# Patient Record
Sex: FEMALE | Race: WHITE | NOT HISPANIC OR LATINO | Employment: OTHER | URBAN - METROPOLITAN AREA
[De-identification: names, ages, dates, MRNs, and addresses within clinical notes are randomized per-mention and may not be internally consistent; named-entity substitution may affect disease eponyms.]

---

## 2017-01-27 ENCOUNTER — TELEPHONE (OUTPATIENT)
Dept: FAMILY MEDICINE | Facility: OTHER | Age: 62
End: 2017-01-27

## 2017-01-27 DIAGNOSIS — H02.9 EYELID ABNORMALITY: Primary | ICD-10-CM

## 2017-01-27 NOTE — TELEPHONE ENCOUNTER
Patient states she needs an Ophthalmology referral placed for a procedure done by Dr. White on 12/8/16 and subsequent follow up on 1/10/17. Patient was last seen by PCP 7/18/16 at which time the office note did mention possibly needing to go to [facility].     This message is forwarded to Ashlee, nurse with Dr. Cannon, for review.

## 2017-01-30 ENCOUNTER — TELEPHONE (OUTPATIENT)
Dept: FAMILY MEDICINE | Facility: OTHER | Age: 62
End: 2017-01-30

## 2017-01-31 DIAGNOSIS — I10 HTN (HYPERTENSION): Primary | ICD-10-CM

## 2017-01-31 DIAGNOSIS — J32.9 SINUSITIS, CHRONIC: ICD-10-CM

## 2017-02-01 RX ORDER — MOMETASONE FUROATE MONOHYDRATE 50 UG/1
SPRAY, METERED NASAL
Qty: 17 G | Refills: 1 | Status: SHIPPED | OUTPATIENT
Start: 2017-02-01 | End: 2017-11-13

## 2017-02-01 RX ORDER — METOPROLOL SUCCINATE 25 MG/1
TABLET, EXTENDED RELEASE ORAL
Qty: 180 TABLET | Refills: 0 | Status: SHIPPED | OUTPATIENT
Start: 2017-02-01 | End: 2017-04-26

## 2017-02-25 LAB
HPV ABSTRACT: NORMAL
PAP-ABSTRACT: NORMAL

## 2017-03-10 DIAGNOSIS — I10 HTN (HYPERTENSION): ICD-10-CM

## 2017-03-10 DIAGNOSIS — I10 ESSENTIAL HYPERTENSION: Primary | ICD-10-CM

## 2017-03-10 NOTE — TELEPHONE ENCOUNTER
Pt of .HCTZ last filled on 9.30.16,# 90. Last office visit on 7.8.16.Last K+ level on 5.12.15.Medication pended for your approval at this time.Thank you.

## 2017-03-11 RX ORDER — HYDROCHLOROTHIAZIDE 12.5 MG/1
TABLET ORAL
Qty: 90 TABLET | Refills: 0 | Status: SHIPPED | OUTPATIENT
Start: 2017-03-11 | End: 2017-06-05

## 2017-04-26 DIAGNOSIS — I10 HTN (HYPERTENSION): ICD-10-CM

## 2017-04-27 RX ORDER — METOPROLOL SUCCINATE 25 MG/1
TABLET, EXTENDED RELEASE ORAL
Qty: 180 TABLET | Refills: 0 | Status: SHIPPED | OUTPATIENT
Start: 2017-04-27 | End: 2017-07-31

## 2017-05-25 ENCOUNTER — TRANSFERRED RECORDS (OUTPATIENT)
Dept: HEALTH INFORMATION MANAGEMENT | Facility: HOSPITAL | Age: 62
End: 2017-05-25

## 2017-06-05 DIAGNOSIS — I10 ESSENTIAL HYPERTENSION: ICD-10-CM

## 2017-06-07 RX ORDER — HYDROCHLOROTHIAZIDE 12.5 MG/1
TABLET ORAL
Qty: 90 TABLET | Refills: 0 | Status: SHIPPED | OUTPATIENT
Start: 2017-06-07 | End: 2017-08-31

## 2017-06-14 ENCOUNTER — TELEPHONE (OUTPATIENT)
Dept: FAMILY MEDICINE | Facility: OTHER | Age: 62
End: 2017-06-14

## 2017-06-14 NOTE — TELEPHONE ENCOUNTER
Please schedule patient for date/time: can work into our schedule tomorrow at 4:15, arrival time 4:00    Have patient go to ER/Urgent Care Center. Urgent Care hours are 9:30 am to 8 pm, open 7 days a week. No.    Provider will call patient.No.    Other:

## 2017-06-14 NOTE — TELEPHONE ENCOUNTER
10:46 AM    Reason for Call: OVERBOOK    Patient is having the following symptoms: High Anxiety for  2 weeks    The patient is requesting an appointment for Thursday with Dr. Kassandra Chaparro    Was an appointment offered for this call? No    Preferred method for responding to this message: [phone]    If we cannot reach you directly, may we leave a detailed response at the number you provided?  Yes    Can this message wait until your PCP/provider returns, if unavailable today?  Yes, Patient was informed that Dr. Chaparro is out today.    Gisella Khalil

## 2017-06-15 ENCOUNTER — OFFICE VISIT (OUTPATIENT)
Dept: FAMILY MEDICINE | Facility: OTHER | Age: 62
End: 2017-06-15
Attending: FAMILY MEDICINE
Payer: COMMERCIAL

## 2017-06-15 VITALS
HEART RATE: 77 BPM | BODY MASS INDEX: 20.92 KG/M2 | TEMPERATURE: 97.7 F | OXYGEN SATURATION: 98 % | WEIGHT: 138 LBS | DIASTOLIC BLOOD PRESSURE: 80 MMHG | RESPIRATION RATE: 20 BRPM | SYSTOLIC BLOOD PRESSURE: 138 MMHG | HEIGHT: 68 IN

## 2017-06-15 DIAGNOSIS — N30.01 ACUTE CYSTITIS WITH HEMATURIA: ICD-10-CM

## 2017-06-15 DIAGNOSIS — R30.0 DYSURIA: Primary | ICD-10-CM

## 2017-06-15 DIAGNOSIS — R11.0 NAUSEA: ICD-10-CM

## 2017-06-15 DIAGNOSIS — R52 PAIN: ICD-10-CM

## 2017-06-15 DIAGNOSIS — F41.9 ANXIETY: ICD-10-CM

## 2017-06-15 LAB
ALBUMIN UR-MCNC: NEGATIVE MG/DL
APPEARANCE UR: CLEAR
BACTERIA #/AREA URNS HPF: ABNORMAL /HPF
BILIRUB UR QL STRIP: NEGATIVE
COLOR UR AUTO: ABNORMAL
GLUCOSE UR STRIP-MCNC: NEGATIVE MG/DL
HGB UR QL STRIP: ABNORMAL
KETONES UR STRIP-MCNC: 10 MG/DL
LEUKOCYTE ESTERASE UR QL STRIP: NEGATIVE
NITRATE UR QL: NEGATIVE
PH UR STRIP: 6.5 PH (ref 4.7–8)
RBC #/AREA URNS AUTO: 1 /HPF (ref 0–2)
SP GR UR STRIP: 1 (ref 1–1.03)
SQUAMOUS #/AREA URNS AUTO: 1 /HPF (ref 0–1)
URN SPEC COLLECT METH UR: ABNORMAL
UROBILINOGEN UR STRIP-MCNC: NORMAL MG/DL (ref 0–2)
WBC #/AREA URNS AUTO: 1 /HPF (ref 0–2)

## 2017-06-15 PROCEDURE — 99213 OFFICE O/P EST LOW 20 MIN: CPT | Performed by: FAMILY MEDICINE

## 2017-06-15 PROCEDURE — 81001 URINALYSIS AUTO W/SCOPE: CPT | Performed by: FAMILY MEDICINE

## 2017-06-15 PROCEDURE — 87086 URINE CULTURE/COLONY COUNT: CPT | Performed by: FAMILY MEDICINE

## 2017-06-15 RX ORDER — SULFAMETHOXAZOLE/TRIMETHOPRIM 800-160 MG
1 TABLET ORAL 2 TIMES DAILY
Qty: 10 TABLET | Refills: 0 | Status: SHIPPED | OUTPATIENT
Start: 2017-06-15 | End: 2017-06-20

## 2017-06-15 RX ORDER — TRETINOIN 0.25 MG/G
CREAM TOPICAL
COMMUNITY
Start: 2017-03-24

## 2017-06-15 RX ORDER — CLONAZEPAM 0.5 MG/1
0.25-0.5 TABLET ORAL 2 TIMES DAILY PRN
Qty: 60 TABLET | Refills: 0 | Status: SHIPPED | OUTPATIENT
Start: 2017-06-15 | End: 2017-12-13

## 2017-06-15 RX ORDER — AZELAIC ACID 0.15 G/G
GEL TOPICAL
COMMUNITY
Start: 2015-12-29 | End: 2018-05-08

## 2017-06-15 RX ORDER — ONDANSETRON 4 MG/1
4 TABLET, FILM COATED ORAL EVERY 8 HOURS PRN
Qty: 30 TABLET | Refills: 1 | Status: SHIPPED | OUTPATIENT
Start: 2017-06-15 | End: 2018-08-01

## 2017-06-15 RX ORDER — DESONIDE 0.5 MG/G
OINTMENT TOPICAL
COMMUNITY
Start: 2016-12-30 | End: 2019-12-02

## 2017-06-15 RX ORDER — IBUPROFEN 600 MG/1
TABLET, FILM COATED ORAL
Qty: 60 TABLET | Refills: 1 | Status: SHIPPED | OUTPATIENT
Start: 2017-06-15 | End: 2018-07-02

## 2017-06-15 ASSESSMENT — PAIN SCALES - GENERAL: PAINLEVEL: NO PAIN (0)

## 2017-06-15 NOTE — MR AVS SNAPSHOT
"              After Visit Summary   6/15/2017    Estela Delacruz    MRN: [id number]           Patient Information     Date Of Birth          1955        Visit Information        Provider Department      6/15/2017 4:15 PM Kassandra Chaparro MD [facility]        Today's Diagnoses     Dysuria    -  1    Acute cystitis with hematuria        Anxiety        Nausea        Pain           Follow-ups after your visit        Follow-up notes from your care team     Return in about 3 weeks (around 7/6/2017).      Who to contact     If you have questions or need follow up information about today's clinic visit or your schedule please contact [facility] directly at [phone].  Normal or non-critical lab and imaging results will be communicated to you by MyChart, letter or phone within 4 business days after the clinic has received the results. If you do not hear from us within 7 days, please contact the clinic through [employer] or phone. If you have a critical or abnormal lab result, we will notify you by phone as soon as possible.  Submit refill requests through [employer] or call your pharmacy and they will forward the refill request to us. Please allow 3 business days for your refill to be completed.          Additional Information About Your Visit        MyChart Information     [employer] gives you secure access to your electronic health record. If you see a primary care provider, you can also send messages to your care team and make appointments. If you have questions, please call your primary care clinic.  If you do not have a primary care provider, please call [phone] and they will assist you.        Care EveryWhere ID     This is your Care EveryWhere ID. This could be used by other organizations to access your [place] medical records  [id number]        Your Vitals Were     Pulse Temperature Respirations Height Pulse Oximetry Breastfeeding?    77 97.7  F (36.5  C) (Tympanic) 20 5' 8\" (1.727 m) " 98% No    BMI (Body Mass Index)                   20.98 kg/m2            Blood Pressure from Last 3 Encounters:   06/15/17 138/80   07/18/16 127/77   03/03/16 128/78    Weight from Last 3 Encounters:   06/15/17 138 lb (62.6 kg)   07/18/16 145 lb (65.8 kg)   03/03/16 145 lb (65.8 kg)              We Performed the Following     UA reflex to Microscopic and Culture - [place]     Urine Culture Aerobic Bacterial          Today's Medication Changes          These changes are accurate as of: 6/15/17 10:45 PM.  If you have any questions, ask your nurse or doctor.               Start taking these medicines.        Dose/Directions    clonazePAM 0.5 MG tablet   Commonly known as:  klonoPIN   Used for:  Anxiety   Started by:  Kassandra Chaparro MD        Dose:  0.25-0.5 mg   Take 0.5-1 tablets (0.25-0.5 mg) by mouth 2 times daily as needed for anxiety   Quantity:  60 tablet   Refills:  0       ondansetron 4 MG tablet   Commonly known as:  ZOFRAN   Used for:  Nausea   Started by:  Kassandra Chaparro MD        Dose:  4 mg   Take 1 tablet (4 mg) by mouth every 8 hours as needed for nausea   Quantity:  30 tablet   Refills:  1       sulfamethoxazole-trimethoprim 800-160 MG per tablet   Commonly known as:  BACTRIM DS/SEPTRA DS   Used for:  Acute cystitis with hematuria   Started by:  Kassandra Chaparro MD        Dose:  1 tablet   Take 1 tablet by mouth 2 times daily for 5 days   Quantity:  10 tablet   Refills:  0         These medicines have changed or have updated prescriptions.        Dose/Directions    ibuprofen 600 MG tablet   Commonly known as:  ADVIL/MOTRIN   This may have changed:  See the new instructions.   Used for:  Pain   Changed by:  Kassandra Chaparro MD        TAKE 1 TABLET BY MOUTH EVERY 8 HOURS AS NEEDED FOR PAIN   Quantity:  60 tablet   Refills:  1            Where to get your medicines      These medications were sent to [id number] - [place], [place] - [address] AT [facility] [address], [place]  [place] [address]     Phone:  [phone]     ibuprofen 600 MG tablet    ondansetron 4 MG tablet    sulfamethoxazole-trimethoprim 800-160 MG per tablet         Some of these will need a paper prescription and others can be bought over the counter.  Ask your nurse if you have questions.     Bring a paper prescription for each of these medications     clonazePAM 0.5 MG tablet                Primary Care Provider Office Phone # Fax #    Kassandra Chaparro [phone]       [facility] [place] [address]  [facility] [place] [address]        Thank you!     Thank you for choosing [facility] [place]  for your care. Our goal is always to provide you with excellent care. Hearing back from our patients is one way we can continue to improve our services. Please take a few minutes to complete the written survey that you may receive in the mail after your visit with us. Thank you!             Your Updated Medication List - Protect others around you: Learn how to safely use, store and throw away your medicines at www.disposemymeds.org.          This list is accurate as of: 6/15/17 10:45 PM.  Always use your most recent med list.                   Brand Name Dispense Instructions for use    ASPIR-LOW 81 MG EC tablet   Generic drug:  aspirin     90 tablet    TAKE ONE TABLET BY MOUTH DAILY       Azelaic Acid 15 % gel          clonazePAM 0.5 MG tablet    klonoPIN    60 tablet    Take 0.5-1 tablets (0.25-0.5 mg) by mouth 2 times daily as needed for anxiety       desonide 0.05 % ointment    DESOWEN         hydrochlorothiazide 12.5 MG Tabs tablet     90 tablet    TAKE 1 TABLET BY MOUTH DAILY       ibuprofen 600 MG tablet    ADVIL/MOTRIN    60 tablet    TAKE 1 TABLET BY MOUTH EVERY 8 HOURS AS NEEDED FOR PAIN       metoprolol 25 MG 24 hr tablet    TOPROL-XL    180 tablet    TAKE 2 TABLETS BY MOUTH DAILY       metroNIDAZOLE 0.75 % topical gel    METROGEL    45 g    Apply topically 2 times daily Apply h,s. And AM to central  face - [id number]       mometasone 50 MCG/ACT spray    NASONEX    17 g    SPRAY 2 SPRAYS INTO EACH NOSTRIL DAILY AS NEEDED       nexIUM 40 MG CR capsule   Generic drug:  esomeprazole     90 capsule    TAKE 1 CAPSULE BY MOUTH DAILY       ondansetron 4 MG tablet    ZOFRAN    30 tablet    Take 1 tablet (4 mg) by mouth every 8 hours as needed for nausea       PREMARIN 0.625 MG tablet   Generic drug:  estrogens (conjugated)      Take  by mouth daily.       PROVERA 10 MG tablet   Generic drug:  medroxyPROGESTERone      Take 10 mg by mouth. takes every other month days 1-12       sulfamethoxazole-trimethoprim 800-160 MG per tablet    BACTRIM DS/SEPTRA DS    10 tablet    Take 1 tablet by mouth 2 times daily for 5 days       tretinoin 0.025 % cream    RETIN-A     APPLY TOPICALLY AT BEDTIME

## 2017-06-15 NOTE — NURSING NOTE
"Chief Complaint   Patient presents with     Anxiety     UTI       Initial /80  Pulse 77  Temp 97.7  F (36.5  C) (Tympanic)  Resp 20  Ht 5' 8\" (1.727 m)  Wt 138 lb (62.6 kg)  SpO2 98%  Breastfeeding? No  BMI 20.98 kg/m2 Estimated body mass index is 20.98 kg/(m^2) as calculated from the following:    Height as of this encounter: 5' 8\" (1.727 m).    Weight as of this encounter: 138 lb (62.6 kg).  Medication Reconciliation: complete   Ashlee Carvajal      "

## 2017-06-16 NOTE — PROGRESS NOTES
[facility]    Estela Delacruz, 62 year old, female presents with   Chief Complaint   Patient presents with     Anxiety     her  left her unexpectedly. She doesn't feel depressed, just very anxious. We had used Klonopin in the past which worked well, Sertraline or something similar did not agree with her and she prefers not to try this again.      UTI     dysuria and hematuria the last 2 days       PAST MEDICAL HISTORY:  Past Medical History:   Diagnosis Date     Asymptomatic varicose veins 02/04/2013     Basal cell carcinoma 04/26/2007    [facility] chest wall. Dr Frey     Esophageal reflux 05/26/2011     Hypertension 03/28/2006     Unspecified sinusitis (chronic) 02/25/2000       PAST SURGICAL HISTORY:  Past Surgical History:   Procedure Laterality Date     Arthroscopy right meniscal tear  2006    Dr Orozco     Arthroscopy right shoulder  2010     COLONOSCOPY  2008    Family history of colon cancer (mother). Negative. Repeat 2013     DILATION AND CURETTAGE       Removed mass left neck  2007    Dr Cervantes     Septoplasty and sinus surgery  2001     [place] teeth removed         SOCIAL HISTORY  Social History     Social History     Marital status:      Spouse name: N/A     Number of children: N/A     Years of education: N/A     Occupational History     Clerical      full-time     Social History Main Topics     Smoking status: Never Smoker     Smokeless tobacco: Never Used     Alcohol use Yes      Comment: occasionally     Drug use: No     Sexual activity: Not on file     Other Topics Concern     Caffeine Concern Yes     coffee-3 cups daily     Parent/Sibling W/ Cabg, Mi Or Angioplasty Before 65f 55m? No     Social History Narrative       MEDICATIONS:  Prior to Admission medications    Medication Sig Start Date End Date Taking? Authorizing Provider   sulfamethoxazole-trimethoprim (BACTRIM DS/SEPTRA DS) 800-160 MG per tablet Take 1 tablet by mouth 2 times daily for 5 days 6/15/17 6/20/17 Yes  "Kassandra Chaparro MD   clonazePAM (KLONOPIN) 0.5 MG tablet Take 0.5-1 tablets (0.25-0.5 mg) by mouth 2 times daily as needed for anxiety 6/15/17  Yes Kassandra Chaparro MD   ondansetron (ZOFRAN) 4 MG tablet Take 1 tablet (4 mg) by mouth every 8 hours as needed for nausea 6/15/17  Yes Kassandra Chaparro MD   ibuprofen (ADVIL/MOTRIN) 600 MG tablet TAKE 1 TABLET BY MOUTH EVERY 8 HOURS AS NEEDED FOR PAIN 6/15/17  Yes Kassandra Chaparro MD   Azelaic Acid 15 % gel  12/29/15  Yes Reported, Patient   desonide (DESOWEN) 0.05 % ointment  12/30/16  Yes Reported, Patient   tretinoin (RETIN-A) 0.025 % cream APPLY TOPICALLY AT BEDTIME 3/24/17  Yes Reported, Patient   hydrochlorothiazide 12.5 MG TABS tablet TAKE 1 TABLET BY MOUTH DAILY 6/7/17  Yes Reynold Henao MD   metoprolol (TOPROL-XL) 25 MG 24 hr tablet TAKE 2 TABLETS BY MOUTH DAILY 4/27/17  Yes Kassandra Chaparro MD   mometasone (NASONEX) 50 MCG/ACT spray SPRAY 2 SPRAYS INTO EACH NOSTRIL DAILY AS NEEDED 2/1/17  Yes Kassandra Chaparro MD   NEXIUM 40 MG capsule TAKE 1 CAPSULE BY MOUTH DAILY 10/10/16  Yes Kassandra Chaparor MD   metroNIDAZOLE (METROGEL) 0.75 % gel Apply topically 2 times daily Apply h,s. And AM to central face - [id number] 8/18/15  Yes HUMZA Green MD   medroxyPROGESTERone (PROVERA) 10 MG tablet Take 10 mg by mouth. takes every other month days 1-12   Yes Reported, Patient   estrogens, conjugated, (PREMARIN) 0.625 MG tablet Take  by mouth daily.   Yes Reported, Patient   ASPIR-LOW 81 MG EC tablet TAKE ONE TABLET BY MOUTH DAILY  Patient not taking: Reported on 6/15/2017 1/15/15   Kassandra Chaparro MD       ALLERGIES:   No Known Allergies    ROS:  C: NEGATIVE for fever, chills, change in weight  GI: NEGATIVE for nausea, abdominal pain, heartburn, or change in bowel habits  N: NEGATIVE for weakness, dizziness or paresthesias  P: NEGATIVE for changes in mood or affect    EXAM:  /80  Pulse 77  Temp 97.7  F (36.5  C) (Tympanic)  Resp 20  Ht 5' 8\" (1.727 m)  Wt 138 lb " (62.6 kg)  SpO2 98%  Breastfeeding? No  BMI 20.98 kg/m2 Body mass index is 20.98 kg/(m^2).   GENERAL APPEARANCE: healthy, alert and teary  NEURO: Normal strength and tone, mentation intact and speech normal  PSYCH: mentation appears normal and worried    LABS AND IMAGING:     Results for orders placed or performed in visit on 06/15/17   UA reflex to Microscopic and Culture - [place]   Result Value Ref Range    Color Urine Light Yellow     Appearance Urine Clear     Glucose Urine Negative NEG mg/dL    Bilirubin Urine Negative NEG    Ketones Urine 10 (A) NEG mg/dL    Specific Gravity Urine 1.003 1.003 - 1.035    Blood Urine Small (A) NEG    pH Urine 6.5 4.7 - 8.0 pH    Protein Albumin Urine Negative NEG mg/dL    Urobilinogen mg/dL Normal 0.0 - 2.0 mg/dL    Nitrite Urine Negative NEG    Leukocyte Esterase Urine Negative NEG    Source Midstream Urine     RBC Urine 1 0 - 2 /HPF    WBC Urine 1 0 - 2 /HPF    Bacteria Urine Few (A) NEG /HPF    Squamous Epithelial /HPF Urine 1 0 - 1 /HPF         ASSESSMENT/PLAN:  (R30.0) Dysuria  (primary encounter diagnosis)  Comment:   Plan: UA reflex to Microscopic and Culture - [place]            (N30.01) Acute cystitis with hematuria  Comment:   Plan: Urine Culture Aerobic Bacterial,         sulfamethoxazole-trimethoprim (BACTRIM         DS/SEPTRA DS) 800-160 MG per tablet        Borderline but given her symptoms will treat bid for 5 days. Follow up if not improved    (F41.9) Anxiety  Comment:   Plan: clonazePAM (KLONOPIN) 0.5 MG tablet        Discussed options and will go with this, discussed the addictive nature of the mediation and need for short term use. Follow up in 3 weeks, sooner for concerns    (R11.0) Nausea  Comment: she has had vomiting from anxiety and isn't eating well   Plan: ondansetron (ZOFRAN) 4 MG tablet        As needed    (R52) Pain  Comment:   Plan: ibuprofen (ADVIL/MOTRIN) 600 MG tablet        Renewed for migraines      Kassandra Chaparro MD  Darling 15,  2017

## 2017-06-17 LAB
BACTERIA SPEC CULT: NO GROWTH
MICRO REPORT STATUS: NORMAL
SPECIMEN SOURCE: NORMAL

## 2017-06-27 DIAGNOSIS — K21.9 GASTROESOPHAGEAL REFLUX DISEASE WITHOUT ESOPHAGITIS: ICD-10-CM

## 2017-06-28 RX ORDER — ESOMEPRAZOLE MAGNESIUM 40 MG
CAPSULE,DELAYED RELEASE (ENTERIC COATED) ORAL
Qty: 90 CAPSULE | Refills: 0 | Status: SHIPPED | OUTPATIENT
Start: 2017-06-28 | End: 2022-08-01

## 2017-06-28 NOTE — TELEPHONE ENCOUNTER
NEXIUM 40 MG CR capsule  Last Written Prescription Date: 10-  Last Fill Quantity: 90,  # refills: 0   Last Office Visit with G, P or [facility] prescribing provider: 6-                                         Next 5 appointments (look out 90 days)     Jul 25, 2017 11:30 AM CDT   (Arrive by 11:15 AM)   SHORT with Kassandra Chaparro MD   [facility] [place] ([facility] [place] )    [address] [place] [address]   [phone]

## 2017-07-24 ENCOUNTER — TELEPHONE (OUTPATIENT)
Dept: FAMILY MEDICINE | Facility: OTHER | Age: 62
End: 2017-07-24

## 2017-07-24 DIAGNOSIS — K21.9 GASTROESOPHAGEAL REFLUX DISEASE WITHOUT ESOPHAGITIS: Primary | ICD-10-CM

## 2017-07-24 RX ORDER — OMEPRAZOLE 40 MG/1
40 CAPSULE, DELAYED RELEASE ORAL DAILY
Qty: 90 CAPSULE | Refills: 1 | Status: SHIPPED | OUTPATIENT
Start: 2017-07-24 | End: 2019-12-02

## 2017-07-24 NOTE — TELEPHONE ENCOUNTER
Insurance does not cover Nexium.  Pharmacy states alternative medications include: Lansoprazole, omeprazole, and pantoprazole.  Please advise on strength, directions, and quantity if you would like to change. Thank you

## 2017-07-31 DIAGNOSIS — I10 HTN (HYPERTENSION): ICD-10-CM

## 2017-08-01 ENCOUNTER — TELEPHONE (OUTPATIENT)
Dept: FAMILY MEDICINE | Facility: OTHER | Age: 62
End: 2017-08-01

## 2017-08-01 NOTE — TELEPHONE ENCOUNTER
Writer called patient to see if she is able to register at 2:15 on 8/14/17 with Dr. Kassandra Chaparro.

## 2017-08-01 NOTE — TELEPHONE ENCOUNTER
Toprol-XL      Last Written Prescription Date: 4/27/17  Last Fill Quantity: 180, # refills: 0    Last Office Visit with G, [facility] or [facility] prescribing provider:  6/15/17   Future Office Visit:    Next 5 appointments (look out 90 days)     Aug 14, 2017  3:45 PM CDT   (Arrive by 3:30 PM)   SHORT with Kassandra Chaparro MD   [facility] [place] ([facility] [place] )    [address] [place] [address]   [phone]                    BP Readings from Last 3 Encounters:   06/15/17 138/80   07/18/16 127/77   03/03/16 128/78

## 2017-08-02 RX ORDER — METOPROLOL SUCCINATE 25 MG/1
TABLET, EXTENDED RELEASE ORAL
Qty: 180 TABLET | Refills: 2 | Status: SHIPPED | OUTPATIENT
Start: 2017-08-02 | End: 2018-06-11

## 2017-08-31 DIAGNOSIS — I10 ESSENTIAL HYPERTENSION: ICD-10-CM

## 2017-08-31 RX ORDER — HYDROCHLOROTHIAZIDE 12.5 MG/1
TABLET ORAL
Qty: 90 TABLET | Refills: 2 | Status: SHIPPED | OUTPATIENT
Start: 2017-08-31 | End: 2018-06-11

## 2017-08-31 NOTE — TELEPHONE ENCOUNTER
Hydrochlorithiazide      Last Written Prescription Date: 6/7/17  Last Fill Quantity: 90, # refills: 0  Last Office Visit with [facility], [facility] or [facility] prescribing provider: 6/15/17  Next 5 appointments (look out 90 days)     Sep 11, 2017 10:45 AM CDT   (Arrive by 10:30 AM)   SHORT with Kassandra Chaparro MD   [facility] [place] ([facility] )    [address] [place] [address]   [phone]                   Potassium   Date Value Ref Range Status   05/12/2015 4.3 3.4 - 5.3 mmol/L Final     Creatinine   Date Value Ref Range Status   05/12/2015 0.52 0.52 - 1.04 mg/dL Final     BP Readings from Last 3 Encounters:   06/15/17 138/80   07/18/16 127/77   03/03/16 128/78

## 2017-11-13 ENCOUNTER — OFFICE VISIT (OUTPATIENT)
Dept: FAMILY MEDICINE | Facility: OTHER | Age: 62
End: 2017-11-13
Attending: FAMILY MEDICINE
Payer: COMMERCIAL

## 2017-11-13 VITALS
TEMPERATURE: 95.5 F | DIASTOLIC BLOOD PRESSURE: 90 MMHG | HEIGHT: 68 IN | SYSTOLIC BLOOD PRESSURE: 140 MMHG | HEART RATE: 76 BPM | WEIGHT: 139 LBS | OXYGEN SATURATION: 100 % | BODY MASS INDEX: 21.07 KG/M2

## 2017-11-13 DIAGNOSIS — J32.0 CHRONIC MAXILLARY SINUSITIS: ICD-10-CM

## 2017-11-13 DIAGNOSIS — J01.00 ACUTE NON-RECURRENT MAXILLARY SINUSITIS: Primary | ICD-10-CM

## 2017-11-13 DIAGNOSIS — Z71.84 TRAVEL ADVICE ENCOUNTER: ICD-10-CM

## 2017-11-13 DIAGNOSIS — B37.31 YEAST INFECTION OF THE VAGINA: ICD-10-CM

## 2017-11-13 PROCEDURE — 99213 OFFICE O/P EST LOW 20 MIN: CPT | Performed by: FAMILY MEDICINE

## 2017-11-13 RX ORDER — MOMETASONE FUROATE MONOHYDRATE 50 UG/1
SPRAY, METERED NASAL
Qty: 17 G | Refills: 11 | Status: SHIPPED | OUTPATIENT
Start: 2017-11-13 | End: 2024-10-01

## 2017-11-13 RX ORDER — FLUCONAZOLE 150 MG/1
150 TABLET ORAL ONCE
Qty: 1 TABLET | Refills: 0 | Status: SHIPPED | OUTPATIENT
Start: 2017-11-13 | End: 2017-12-13

## 2017-11-13 ASSESSMENT — PAIN SCALES - GENERAL: PAINLEVEL: SEVERE PAIN (6)

## 2017-11-13 NOTE — NURSING NOTE
"Chief Complaint   Patient presents with     Sinus Problem     Sinus congestion, drainage, pressure and ear pain.       Initial /90 (BP Location: Right arm, Patient Position: Chair, Cuff Size: Adult Regular)  Pulse 76  Temp 95.5  F (35.3  C) (Tympanic)  Ht 5' 8\" (1.727 m)  Wt 139 lb (63 kg)  SpO2 100%  BMI 21.13 kg/m2 Estimated body mass index is 21.13 kg/(m^2) as calculated from the following:    Height as of this encounter: 5' 8\" (1.727 m).    Weight as of this encounter: 139 lb (63 kg).  Medication Reconciliation: complete   MARCIAL GUADALUPE      "

## 2017-11-13 NOTE — PROGRESS NOTES
[facility]    Estela Delacruz, 62 year old, female presents with   Chief Complaint   Patient presents with     Sinus Problem     Sinus congestion, drainage, pressure and ear pain over the last 2 months, headaches but no fever. She is leaving for [place] tomorrow     Travel Clinic     she will be traveling to [facility] over [place] and is wondering what she might need to go.        PAST MEDICAL HISTORY:  Past Medical History:   Diagnosis Date     Asymptomatic varicose veins 02/04/2013     Basal cell carcinoma 04/26/2007    [place] chest wall. Dr Frey     Esophageal reflux 05/26/2011     Hypertension 03/28/2006     Unspecified sinusitis (chronic) 02/25/2000       PAST SURGICAL HISTORY:  Past Surgical History:   Procedure Laterality Date     Arthroscopy right meniscal tear  2006    Dr Orozco     Arthroscopy right shoulder  2010     COLONOSCOPY  2008    Family history of colon cancer (mother). Negative. Repeat 2013     DILATION AND CURETTAGE       Removed mass left neck  2007    Dr Cervantes     Septoplasty and sinus surgery  2001     [place] teeth removed         SOCIAL HISTORY  Social History     Social History     Marital status:      Spouse name: N/A     Number of children: N/A     Years of education: N/A     Occupational History     Clerical      full-time     Social History Main Topics     Smoking status: Never Smoker     Smokeless tobacco: Never Used     Alcohol use Yes      Comment: occasionally     Drug use: No     Sexual activity: Not on file     Other Topics Concern     Caffeine Concern Yes     coffee-3 cups daily     Parent/Sibling W/ Cabg, Mi Or Angioplasty Before 65f 55m? No     Social History Narrative       MEDICATIONS:  Prior to Admission medications    Medication Sig Start Date End Date Taking? Authorizing Provider   amoxicillin-clavulanate (AUGMENTIN) 875-125 MG per tablet Take 1 tablet by mouth 2 times daily 11/13/17  Yes Kassandra Chaparro MD   fluconazole (DIFLUCAN) 150 MG tablet  Take 1 tablet (150 mg) by mouth once for 1 dose 11/13/17 11/13/17 Yes Kassandra Chaparro MD   mometasone (NASONEX) 50 MCG/ACT spray SPRAY 2 SPRAYS INTO EACH NOSTRIL DAILY AS NEEDED 11/13/17  Yes Kassandra Chaparro MD   hydrochlorothiazide 12.5 MG TABS tablet TAKE 1 TABLET BY MOUTH DAILY 8/31/17  Yes Reynold Henao MD   metoprolol (TOPROL-XL) 25 MG 24 hr tablet TAKE 2 TABLETS BY MOUTH DAILY 8/2/17  Yes Kassandra Chaparro MD   omeprazole (PRILOSEC) 40 MG capsule Take 1 capsule (40 mg) by mouth daily 7/24/17  Yes Kassandra Chaparro MD   NEXIUM 40 MG CR capsule TAKE 1 CAPSULE BY MOUTH DAILY 6/28/17  Yes Kassandra Chaparro MD   clonazePAM (KLONOPIN) 0.5 MG tablet Take 0.5-1 tablets (0.25-0.5 mg) by mouth 2 times daily as needed for anxiety 6/15/17  Yes Kassandra Chaparro MD   ondansetron (ZOFRAN) 4 MG tablet Take 1 tablet (4 mg) by mouth every 8 hours as needed for nausea 6/15/17  Yes Kassandra Chaparro MD   ibuprofen (ADVIL/MOTRIN) 600 MG tablet TAKE 1 TABLET BY MOUTH EVERY 8 HOURS AS NEEDED FOR PAIN 6/15/17  Yes Kassandra Chaparro MD   Azelaic Acid 15 % gel  12/29/15  Yes Reported, Patient   desonide (DESOWEN) 0.05 % ointment  12/30/16  Yes Reported, Patient   tretinoin (RETIN-A) 0.025 % cream APPLY TOPICALLY AT BEDTIME 3/24/17  Yes Reported, Patient   metroNIDAZOLE (METROGEL) 0.75 % gel Apply topically 2 times daily Apply h,s. And AM to central face - [id number] 8/18/15  Yes HUMZA Green MD   ASPIR-LOW 81 MG EC tablet TAKE ONE TABLET BY MOUTH DAILY 1/15/15  Yes Kassandra Chaparro MD   medroxyPROGESTERone (PROVERA) 10 MG tablet Take 10 mg by mouth. takes every other month days 1-12   Yes Reported, Patient   estrogens, conjugated, (PREMARIN) 0.625 MG tablet Take  by mouth daily.   Yes Reported, Patient       ALLERGIES:   No Known Allergies    ROS:  CONSTITUTIONAL:NEGATIVE for fever, chills, change in weight  NEURO: NEGATIVE for weakness, dizziness or paresthesias  PSYCHIATRIC: NEGATIVE for changes in mood or affect    EXAM:  /90 (BP  "Location: Right arm, Patient Position: Chair, Cuff Size: Adult Regular)  Pulse 76  Temp 95.5  F (35.3  C) (Tympanic)  Ht 5' 8\" (1.727 m)  Wt 139 lb (63 kg)  SpO2 100%  BMI 21.13 kg/m2 Body mass index is 21.13 kg/(m^2).   GENERAL APPEARANCE: healthy, alert and no distress  EYES: Eyes grossly normal to inspection, PERRL and conjunctivae and sclerae normal  HENT: ear canals and TM's normal and nose and mouth without ulcers or lesions  NECK: no adenopathy, no asymmetry, masses, or scars and thyroid normal to palpation  RESP: lungs clear to auscultation - no rales, rhonchi or wheezes  CV: regular rates and rhythm, normal S1 S2, no S3 or S4 and no murmur, click or rub  NEURO: Normal strength and tone, mentation intact and speech normal  PSYCH: mentation appears normal and affect normal/bright    LABS AND IMAGING:           ASSESSMENT/PLAN:  (J01.00) Acute non-recurrent maxillary sinusitis  (primary encounter diagnosis)  Comment:   Plan: amoxicillin-clavulanate (AUGMENTIN) 875-125 MG         per tablet        Bid for 10 days, she is advised to take this and follow up if not improved. Continue fluids, Netti pot if able and decongestants as needed    (B37.3) Yeast infection of the vagina  Comment:   Plan: fluconazole (DIFLUCAN) 150 MG tablet        While she is traveling to have on hand if she develops a yeast infection    (J32.0) Chronic maxillary sinusitis  Comment:   Plan: mometasone (NASONEX) 50 MCG/ACT spray        renewed    (Z71.89) Travel advice encounter  Comment:   Plan: TRAVEL CLINIC REFERRAL        Reviewed, she will need Td, Hep A and B, typhoid and possibly yellow fever with malaria medication as well. Will refer to [facility] travel clinic      Kassandra Chaparro MD  November 13, 2017          "

## 2017-11-13 NOTE — MR AVS SNAPSHOT
After Visit Summary   11/13/2017    Estela Delacruz    MRN: [id number]           Patient Information     Date Of Birth          1955        Visit Information        Provider Department      11/13/2017 10:00 AM Kassandra Chaparro MD [facility]        Today's Diagnoses     Acute non-recurrent maxillary sinusitis    -  1    Yeast infection of the vagina        Chronic maxillary sinusitis        Travel advice encounter           Follow-ups after your visit        Additional Services     TRAVEL CLINIC REFERRAL       Your provider has referred you to the [facility]    Please be aware that coverage of these services is subject to the terms and limitations of your health insurance plans.  Call member services at your health plan with any benefit coverage questions.                  Follow-up notes from your care team     Return if symptoms worsen or fail to improve.      Who to contact     If you have questions or need follow up information about today's clinic visit or your schedule please contact [facility] directly at [phone].  Normal or non-critical lab and imaging results will be communicated to you by MyChart, letter or phone within 4 business days after the clinic has received the results. If you do not hear from us within 7 days, please contact the clinic through [employer] or phone. If you have a critical or abnormal lab result, we will notify you by phone as soon as possible.  Submit refill requests through [employer] or call your pharmacy and they will forward the refill request to us. Please allow 3 business days for your refill to be completed.          Additional Information About Your Visit        MyChart Information     [employer] gives you secure access to your electronic health record. If you see a primary care provider, you can also send messages to your care team and make appointments. If you have questions, please call your primary care clinic.  If you  "do not have a primary care provider, please call [phone] and they will assist you.        Care EveryWhere ID     This is your Care EveryWhere ID. This could be used by other organizations to access your [place] medical records  [id number]        Your Vitals Were     Pulse Temperature Height Pulse Oximetry BMI (Body Mass Index)       76 95.5  F (35.3  C) (Tympanic) 5' 8\" (1.727 m) 100% 21.13 kg/m2        Blood Pressure from Last 3 Encounters:   11/13/17 140/90   06/15/17 138/80   07/18/16 127/77    Weight from Last 3 Encounters:   11/13/17 139 lb (63 kg)   06/15/17 138 lb (62.6 kg)   07/18/16 145 lb (65.8 kg)              We Performed the Following     TRAVEL CLINIC REFERRAL          Today's Medication Changes          These changes are accurate as of: 11/13/17 10:48 AM.  If you have any questions, ask your nurse or doctor.               Start taking these medicines.        Dose/Directions    amoxicillin-clavulanate 875-125 MG per tablet   Commonly known as:  AUGMENTIN   Used for:  Acute non-recurrent maxillary sinusitis   Started by:  Kassandra Chaparro MD        Dose:  1 tablet   Take 1 tablet by mouth 2 times daily   Quantity:  20 tablet   Refills:  0       fluconazole 150 MG tablet   Commonly known as:  DIFLUCAN   Used for:  Yeast infection of the vagina   Started by:  Kassandra Chaparro MD        Dose:  150 mg   Take 1 tablet (150 mg) by mouth once for 1 dose   Quantity:  1 tablet   Refills:  0         These medicines have changed or have updated prescriptions.        Dose/Directions    mometasone 50 MCG/ACT spray   Commonly known as:  NASONEX   This may have changed:  See the new instructions.   Used for:  Chronic maxillary sinusitis   Changed by:  Kassandra Chaparro MD        SPRAY 2 SPRAYS INTO EACH NOSTRIL DAILY AS NEEDED   Quantity:  17 g   Refills:  11            Where to get your medicines      These medications were sent to [facility] [address] [facility], [place] - [address] AT [facility] [address]  [address], [facility] [address]     Phone:  [phone]     amoxicillin-clavulanate 875-125 MG per tablet    fluconazole 150 MG tablet    mometasone 50 MCG/ACT spray                Primary Care Provider Office Phone # Fax #    Kassandra Chaparro [phone]       [facility] [address]  [facility] [address]        Equal Access to Services     EVIE BURNS : Hadii aad ku hadasho Soomaali, waaxda luqadaha, qaybta kaalmada adeegyada, waxay idiin hayaan adeeg kharash larhonda . So [facility] [phone].    ATENCIÓN: Si habla espkateryna, tiene a parks disposición servicios gratuitos de asistencia lingüística. Elviame al [phone].    We comply with applicable federal civil rights laws and [place] laws. We do not discriminate on the basis of race, color, national origin, age, disability, sex, sexual orientation, or gender identity.            Thank you!     Thank you for choosing [facility]  for your care. Our goal is always to provide you with excellent care. Hearing back from our patients is one way we can continue to improve our services. Please take a few minutes to complete the written survey that you may receive in the mail after your visit with us. Thank you!             Your Updated Medication List - Protect others around you: Learn how to safely use, store and throw away your medicines at www.disposemymeds.org.          This list is accurate as of: 11/13/17 10:48 AM.  Always use your most recent med list.                   Brand Name Dispense Instructions for use Diagnosis    amoxicillin-clavulanate 875-125 MG per tablet    AUGMENTIN    20 tablet    Take 1 tablet by mouth 2 times daily    Acute non-recurrent maxillary sinusitis       ASPIR-LOW 81 MG EC tablet   Generic drug:  aspirin     90 tablet    TAKE ONE TABLET BY MOUTH DAILY    Health care maintenance       Azelaic Acid 15 % gel           clonazePAM 0.5 MG tablet    klonoPIN    60 tablet    Take 0.5-1 tablets  (0.25-0.5 mg) by mouth 2 times daily as needed for anxiety    Anxiety       desonide 0.05 % ointment    DESOWEN          fluconazole 150 MG tablet    DIFLUCAN    1 tablet    Take 1 tablet (150 mg) by mouth once for 1 dose    Yeast infection of the vagina       hydrochlorothiazide 12.5 MG Tabs tablet     90 tablet    TAKE 1 TABLET BY MOUTH DAILY    Essential hypertension       ibuprofen 600 MG tablet    ADVIL/MOTRIN    60 tablet    TAKE 1 TABLET BY MOUTH EVERY 8 HOURS AS NEEDED FOR PAIN    Pain       metoprolol 25 MG 24 hr tablet    TOPROL-XL    180 tablet    TAKE 2 TABLETS BY MOUTH DAILY    HTN (hypertension)       metroNIDAZOLE 0.75 % topical gel    METROGEL    45 g    Apply topically 2 times daily Apply h,s. And AM to central face - [id number]    Acne rosacea       mometasone 50 MCG/ACT spray    NASONEX    17 g    SPRAY 2 SPRAYS INTO EACH NOSTRIL DAILY AS NEEDED    Chronic maxillary sinusitis       nexIUM 40 MG CR capsule   Generic drug:  esomeprazole     90 capsule    TAKE 1 CAPSULE BY MOUTH DAILY    Gastroesophageal reflux disease without esophagitis       omeprazole 40 MG capsule    priLOSEC    90 capsule    Take 1 capsule (40 mg) by mouth daily    Gastroesophageal reflux disease without esophagitis       ondansetron 4 MG tablet    ZOFRAN    30 tablet    Take 1 tablet (4 mg) by mouth every 8 hours as needed for nausea    Nausea       PREMARIN 0.625 MG tablet   Generic drug:  estrogens (conjugated)      Take  by mouth daily.        PROVERA 10 MG tablet   Generic drug:  medroxyPROGESTERone      Take 10 mg by mouth. takes every other month days 1-12        tretinoin 0.025 % cream    RETIN-A     APPLY TOPICALLY AT BEDTIME

## 2017-11-21 ENCOUNTER — TELEPHONE (OUTPATIENT)
Dept: FAMILY MEDICINE | Facility: OTHER | Age: 62
End: 2017-11-21

## 2017-11-21 DIAGNOSIS — Z23 IMMUNIZATION DUE: Primary | ICD-10-CM

## 2017-11-21 NOTE — TELEPHONE ENCOUNTER
Left message that I am able to order the Hepatitis vaccine for her, since shot room is full tomorrow she can register for a nurse only for Dr Henao as I spoke with his nurse who is ok with giving the vaccine. The order is done. Also noted that her son needs one too but stated he would need to contact his physician and get an order to have that done.

## 2017-11-21 NOTE — TELEPHONE ENCOUNTER
"3:56 PM    Reason for Call: Phone Call    Description: Patient is calling in regards to hepatits B vaccine needed for travel. She discussed with provider at last appt and referred her to another clinic to get in sooner, but patient was not able to get in sooner. She would like to get this done first thing tomorrow morning if possible and she is leaving town for the holiday, but stated, \" I need to get it done tomorrow in order to get the second dose in time for traveling.\" she is aware that provider is not in and would like if another provider could order this. Patient's son also needs this vaccine.    Was an appointment offered for this call? No  If yes : Appointment type              Date    Preferred method for responding to this message: Telephone Call  What is your phone number ?    If we cannot reach you directly, may we leave a detailed response at the number you provided? Yes    Can this message wait until your PCP/provider returns, if available today? No,     Michela Cartagena      "

## 2017-11-22 ENCOUNTER — ALLIED HEALTH/NURSE VISIT (OUTPATIENT)
Dept: FAMILY MEDICINE | Facility: OTHER | Age: 62
End: 2017-11-22
Attending: FAMILY MEDICINE
Payer: COMMERCIAL

## 2017-11-22 DIAGNOSIS — Z23 IMMUNIZATION DUE: ICD-10-CM

## 2017-11-22 PROCEDURE — 90746 HEPB VACCINE 3 DOSE ADULT IM: CPT

## 2017-11-22 PROCEDURE — 90471 IMMUNIZATION ADMIN: CPT

## 2017-11-22 NOTE — PROGRESS NOTES
The following medication was given:     MEDICATION: Hep B   ROUTE: IM  SITE: Deltoid - Left  DOSE: 1ml  LOT #: [id number]  :  [employer]  EXPIRATION DATE:  04/07/2019  NDC#: [id number]    Patient received Hep B injection today. Mariela Baker LPN

## 2017-11-22 NOTE — MR AVS SNAPSHOT
After Visit Summary   11/22/2017    Estela Delacruz    MRN: [id number]           Patient Information     Date Of Birth          1955        Visit Information        Provider Department      11/22/2017 9:30 AM HC FP NURSE [facility]        Today's Diagnoses     Immunization due           Follow-ups after your visit        Who to contact     If you have questions or need follow up information about today's clinic visit or your schedule please contact [facility] directly at [phone].  Normal or non-critical lab and imaging results will be communicated to you by [employer], letter or phone within 4 business days after the clinic has received the results. If you do not hear from us within 7 days, please contact the clinic through [employer] or phone. If you have a critical or abnormal lab result, we will notify you by phone as soon as possible.  Submit refill requests through [employer] or call your pharmacy and they will forward the refill request to us. Please allow 3 business days for your refill to be completed.          Additional Information About Your Visit        MyChart Information     [employer] gives you secure access to your electronic health record. If you see a primary care provider, you can also send messages to your care team and make appointments. If you have questions, please call your primary care clinic.  If you do not have a primary care provider, please call [phone] and they will assist you.        Care EveryWhere ID     This is your Care EveryWhere ID. This could be used by other organizations to access your [place] medical records  [id number]         Blood Pressure from Last 3 Encounters:   11/13/17 140/90   06/15/17 138/80   07/18/16 127/77    Weight from Last 3 Encounters:   11/13/17 139 lb (63 kg)   06/15/17 138 lb (62.6 kg)   07/18/16 145 lb (65.8 kg)              We Performed the Following     ADMIN 1st VACCINE     HEPATITIS B VACCINE, ADULT, IM         Primary Care Provider Office Phone # Fax #    Kassandra Chaparro [phone]       [facility] [place] [address]  [place] [address]        Equal Access to Services     EVIE BURNS : Hadii victor manuel ku hadmanuelao Soomaali, waaxda luqadaha, qaybta kaalmada adeegyada, yan giordanon zafarnatasha park laLolygiorgi hawkins. So [facility] [phone].    ATENCIÓN: Si habla español, tiene a parks disposición servicios gratuitos de asistencia lingüística. Llame al [phone].    We comply with applicable federal civil rights laws and [place] laws. We do not discriminate on the basis of race, color, national origin, age, disability, sex, sexual orientation, or gender identity.            Thank you!     Thank you for choosing [facility]  for your care. Our goal is always to provide you with excellent care. Hearing back from our patients is one way we can continue to improve our services. Please take a few minutes to complete the written survey that you may receive in the mail after your visit with us. Thank you!             Your Updated Medication List - Protect others around you: Learn how to safely use, store and throw away your medicines at www.disposemymeds.org.          This list is accurate as of: 11/22/17  9:35 AM.  Always use your most recent med list.                   Brand Name Dispense Instructions for use Diagnosis    amoxicillin-clavulanate 875-125 MG per tablet    AUGMENTIN    20 tablet    Take 1 tablet by mouth 2 times daily    Acute non-recurrent maxillary sinusitis       ASPIR-LOW 81 MG EC tablet   Generic drug:  aspirin     90 tablet    TAKE ONE TABLET BY MOUTH DAILY    Health care maintenance       Azelaic Acid 15 % gel           clonazePAM 0.5 MG tablet    klonoPIN    60 tablet    Take 0.5-1 tablets (0.25-0.5 mg) by mouth 2 times daily as needed for anxiety    Anxiety       desonide 0.05 % ointment    DESOWEN          hydrochlorothiazide 12.5 MG Tabs tablet     90 tablet    TAKE 1 TABLET  BY MOUTH DAILY    Essential hypertension       ibuprofen 600 MG tablet    ADVIL/MOTRIN    60 tablet    TAKE 1 TABLET BY MOUTH EVERY 8 HOURS AS NEEDED FOR PAIN    Pain       metoprolol 25 MG 24 hr tablet    TOPROL-XL    180 tablet    TAKE 2 TABLETS BY MOUTH DAILY    HTN (hypertension)       metroNIDAZOLE 0.75 % topical gel    METROGEL    45 g    Apply topically 2 times daily Apply h,s. And AM to central face - [id number]    Acne rosacea       mometasone 50 MCG/ACT spray    NASONEX    17 g    SPRAY 2 SPRAYS INTO EACH NOSTRIL DAILY AS NEEDED    Chronic maxillary sinusitis       nexIUM 40 MG CR capsule   Generic drug:  esomeprazole     90 capsule    TAKE 1 CAPSULE BY MOUTH DAILY    Gastroesophageal reflux disease without esophagitis       omeprazole 40 MG capsule    priLOSEC    90 capsule    Take 1 capsule (40 mg) by mouth daily    Gastroesophageal reflux disease without esophagitis       ondansetron 4 MG tablet    ZOFRAN    30 tablet    Take 1 tablet (4 mg) by mouth every 8 hours as needed for nausea    Nausea       PREMARIN 0.625 MG tablet   Generic drug:  estrogens (conjugated)      Take  by mouth daily.        PROVERA 10 MG tablet   Generic drug:  medroxyPROGESTERone      Take 10 mg by mouth. takes every other month days 1-12        tretinoin 0.025 % cream    RETIN-A     APPLY TOPICALLY AT BEDTIME

## 2017-11-29 ENCOUNTER — TELEPHONE (OUTPATIENT)
Dept: FAMILY MEDICINE | Facility: OTHER | Age: 62
End: 2017-11-29

## 2017-11-29 DIAGNOSIS — Z71.84 TRAVEL ADVICE ENCOUNTER: Primary | ICD-10-CM

## 2017-11-29 NOTE — TELEPHONE ENCOUNTER
Pt called stating that her and her son need a referral done for the travel clinic in [place]. Will you please call and let her know when this is done. Thank you

## 2017-12-07 ENCOUNTER — OFFICE VISIT (OUTPATIENT)
Dept: FAMILY MEDICINE | Facility: OTHER | Age: 62
End: 2017-12-07
Attending: FAMILY MEDICINE
Payer: COMMERCIAL

## 2017-12-07 VITALS
TEMPERATURE: 96.9 F | WEIGHT: 131 LBS | HEART RATE: 75 BPM | HEIGHT: 68 IN | BODY MASS INDEX: 19.85 KG/M2 | RESPIRATION RATE: 20 BRPM | DIASTOLIC BLOOD PRESSURE: 78 MMHG | OXYGEN SATURATION: 99 % | SYSTOLIC BLOOD PRESSURE: 132 MMHG

## 2017-12-07 DIAGNOSIS — J01.01 ACUTE RECURRENT MAXILLARY SINUSITIS: Primary | ICD-10-CM

## 2017-12-07 DIAGNOSIS — Z23 IMMUNIZATION DUE: ICD-10-CM

## 2017-12-07 DIAGNOSIS — J20.9 ACUTE BRONCHITIS, UNSPECIFIED ORGANISM: ICD-10-CM

## 2017-12-07 PROCEDURE — 99213 OFFICE O/P EST LOW 20 MIN: CPT | Performed by: FAMILY MEDICINE

## 2017-12-07 RX ORDER — CODEINE PHOSPHATE AND GUAIFENESIN 10; 100 MG/5ML; MG/5ML
1 SOLUTION ORAL EVERY 4 HOURS PRN
Qty: 120 ML | Refills: 0 | Status: SHIPPED | OUTPATIENT
Start: 2017-12-07 | End: 2018-05-08

## 2017-12-07 RX ORDER — LEVOFLOXACIN 500 MG/1
500 TABLET, FILM COATED ORAL DAILY
Qty: 10 TABLET | Refills: 1 | Status: SHIPPED | OUTPATIENT
Start: 2017-12-07 | End: 2018-03-19

## 2017-12-07 ASSESSMENT — ANXIETY QUESTIONNAIRES
4. TROUBLE RELAXING: SEVERAL DAYS
5. BEING SO RESTLESS THAT IT IS HARD TO SIT STILL: NOT AT ALL
GAD7 TOTAL SCORE: 5
3. WORRYING TOO MUCH ABOUT DIFFERENT THINGS: SEVERAL DAYS
6. BECOMING EASILY ANNOYED OR IRRITABLE: SEVERAL DAYS
IF YOU CHECKED OFF ANY PROBLEMS ON THIS QUESTIONNAIRE, HOW DIFFICULT HAVE THESE PROBLEMS MADE IT FOR YOU TO DO YOUR WORK, TAKE CARE OF THINGS AT HOME, OR GET ALONG WITH OTHER PEOPLE: SOMEWHAT DIFFICULT
1. FEELING NERVOUS, ANXIOUS, OR ON EDGE: SEVERAL DAYS
2. NOT BEING ABLE TO STOP OR CONTROL WORRYING: SEVERAL DAYS
7. FEELING AFRAID AS IF SOMETHING AWFUL MIGHT HAPPEN: NOT AT ALL

## 2017-12-07 ASSESSMENT — PAIN SCALES - GENERAL: PAINLEVEL: NO PAIN (0)

## 2017-12-07 ASSESSMENT — PATIENT HEALTH QUESTIONNAIRE - PHQ9: SUM OF ALL RESPONSES TO PHQ QUESTIONS 1-9: 0

## 2017-12-07 NOTE — PROGRESS NOTES
[facility]    Estela Delacruz, 62 year old, female presents with   Chief Complaint   Patient presents with     Sick     congestion, cough with mucus and sore throat. Treatment with Augmentin completely cleared her previous sinus infection in November.Onset 6 days with sinus congestion of the left maxillary sinus and cough productive of green phlegm with green rhinorrhea       PAST MEDICAL HISTORY:  Past Medical History:   Diagnosis Date     Asymptomatic varicose veins 02/04/2013     Basal cell carcinoma 04/26/2007    Rig chest wall. Dr Frey     Esophageal reflux 05/26/2011     Hypertension 03/28/2006     Unspecified sinusitis (chronic) 02/25/2000       PAST SURGICAL HISTORY:  Past Surgical History:   Procedure Laterality Date     Arthroscopy right meniscal tear  2006    Dr Orozco     Arthroscopy right shoulder  2010     COLONOSCOPY  2008    Family history of colon cancer (mother). Negative. Repeat 2013     DILATION AND CURETTAGE       Removed mass left neck  2007    Dr Cervantes     Septoplasty and sinus surgery  2001     Joiner teeth removed         SOCIAL HISTORY  Social History     Social History     Marital status:      Spouse name: N/A     Number of children: N/A     Years of education: N/A     Occupational History     Clerical      full-time     Social History Main Topics     Smoking status: Never Smoker     Smokeless tobacco: Never Used     Alcohol use Yes      Comment: occasionally     Drug use: No     Sexual activity: Not on file     Other Topics Concern     Caffeine Concern Yes     coffee-3 cups daily     Parent/Sibling W/ Cabg, Mi Or Angioplasty Before 65f 55m? No     Social History Narrative       MEDICATIONS:  Prior to Admission medications    Medication Sig Start Date End Date Taking? Authorizing Provider   levofloxacin (LEVAQUIN) 500 MG tablet Take 1 tablet (500 mg) by mouth daily 12/7/17  Yes Kassandra Chaparro MD   guaiFENesin-codeine (ROBITUSSIN AC) 100-10 MG/5ML SOLN solution  Take 5 mLs by mouth every 4 hours as needed for cough 12/7/17  Yes Kassandra Chaparro MD   mometasone (NASONEX) 50 MCG/ACT spray SPRAY 2 SPRAYS INTO EACH NOSTRIL DAILY AS NEEDED 11/13/17  Yes Kassandra Chaparro MD   hydrochlorothiazide 12.5 MG TABS tablet TAKE 1 TABLET BY MOUTH DAILY 8/31/17  Yes Reynold Henao MD   metoprolol (TOPROL-XL) 25 MG 24 hr tablet TAKE 2 TABLETS BY MOUTH DAILY 8/2/17  Yes Kassandra Chaparro MD   omeprazole (PRILOSEC) 40 MG capsule Take 1 capsule (40 mg) by mouth daily 7/24/17  Yes Kassandra Chaparro MD   NEXIUM 40 MG CR capsule TAKE 1 CAPSULE BY MOUTH DAILY 6/28/17  Yes Kassandra Chaparro MD   clonazePAM (KLONOPIN) 0.5 MG tablet Take 0.5-1 tablets (0.25-0.5 mg) by mouth 2 times daily as needed for anxiety 6/15/17  Yes Kassandra Chaparro MD   ondansetron (ZOFRAN) 4 MG tablet Take 1 tablet (4 mg) by mouth every 8 hours as needed for nausea 6/15/17  Yes Kassandra Chaparro MD   ibuprofen (ADVIL/MOTRIN) 600 MG tablet TAKE 1 TABLET BY MOUTH EVERY 8 HOURS AS NEEDED FOR PAIN 6/15/17  Yes Kassandra Chaparro MD   desonide (DESOWEN) 0.05 % ointment  12/30/16  Yes Reported, Patient   tretinoin (RETIN-A) 0.025 % cream APPLY TOPICALLY AT BEDTIME 3/24/17  Yes Reported, Patient   metroNIDAZOLE (METROGEL) 0.75 % gel Apply topically 2 times daily Apply h,s. And AM to central face - [id number] 8/18/15  Yes HUMZA Green MD   medroxyPROGESTERone (PROVERA) 10 MG tablet Take 10 mg by mouth. takes every other month days 1-12   Yes Reported, Patient   estrogens, conjugated, (PREMARIN) 0.625 MG tablet Take  by mouth daily.   Yes Reported, Patient   Azelaic Acid 15 % gel  12/29/15   Reported, Patient   ASPIR-LOW 81 MG EC tablet TAKE ONE TABLET BY MOUTH DAILY  Patient not taking: Reported on 12/7/2017 1/15/15   Kassandra Chaparro MD       ALLERGIES:   No Known Allergies    ROS:  CONSTITUTIONAL:NEGATIVE for fever, chills, change in weight  ENT/MOUTH: POSITIVE for sore throat  RESP:as above  CV: NEGATIVE for chest pain, palpitations or  "peripheral edema  NEURO: NEGATIVE for weakness, dizziness or paresthesias  PSYCHIATRIC: NEGATIVE for changes in mood or affect    EXAM:  /78 (BP Location: Right arm, Patient Position: Sitting, Cuff Size: Adult Regular)  Pulse 75  Temp 96.9  F (36.1  C) (Tympanic)  Resp 20  Ht 5' 8\" (1.727 m)  Wt 131 lb (59.4 kg)  SpO2 99%  BMI 19.92 kg/m2 Body mass index is 19.92 kg/(m^2).   GENERAL APPEARANCE: healthy, alert and no distress  EYES: Eyes grossly normal to inspection and conjunctivae and sclerae normal  HENT: ear canals and TM's normal and nose and mouth without ulcers or lesions  NECK: no adenopathy, no asymmetry, masses, or scars and thyroid normal to palpation  RESP: lungs clear to auscultation - no rales, rhonchi or wheezes  CV: regular rates and rhythm, normal S1 S2, no S3 or S4 and no murmur, click or rub  NEURO: Normal strength and tone, mentation intact and speech normal  PSYCH: mentation appears normal and affect normal/bright    LABS AND IMAGING:           ASSESSMENT/PLAN:  (J01.01) Acute recurrent maxillary sinusitis  (primary encounter diagnosis)  Comment:   Plan: levofloxacin (LEVAQUIN) 500 MG tablet        Will treat daily for 10 days. She is given a refill to take with her to [facility] where she will be traveling December 22. She did visit the Travel clinic in [place] and received shots and will need a second hepatitis B vaccine around December 20    (J20.9) Acute bronchitis, unspecified organism  Comment:   Plan: levofloxacin (LEVAQUIN) 500 MG tablet,         guaiFENesin-codeine (ROBITUSSIN AC) 100-10         MG/5ML SOLN solution        As above, follow up if not improving      Kassandra Chaparro MD  December 7, 2017          "

## 2017-12-07 NOTE — NURSING NOTE
"Chief Complaint   Patient presents with     Sick     congestion, cough with mucus and sore throat. Onset 6 days       Initial /78 (BP Location: Right arm, Patient Position: Sitting, Cuff Size: Adult Regular)  Pulse 75  Temp 96.9  F (36.1  C) (Tympanic)  Resp 20  Ht 5' 8\" (1.727 m)  Wt 131 lb (59.4 kg)  SpO2 99%  BMI 19.92 kg/m2 Estimated body mass index is 19.92 kg/(m^2) as calculated from the following:    Height as of this encounter: 5' 8\" (1.727 m).    Weight as of this encounter: 131 lb (59.4 kg).  Medication Reconciliation: complete   Angelika Perea LPN    "

## 2017-12-07 NOTE — MR AVS SNAPSHOT
After Visit Summary   12/7/2017    Estela Delacruz    MRN: [id number]           Patient Information     Date Of Birth          1955        Visit Information        Provider Department      12/7/2017 8:45 AM Kassandra Chaparro MD [facility]        Today's Diagnoses     Acute recurrent maxillary sinusitis    -  1    Acute bronchitis, unspecified organism        Immunization due           Follow-ups after your visit        Future tests that were ordered for you today     Open Future Orders        Priority Expected Expires Ordered    HEPATITIS B VACCINE, ADULT, IM Routine  12/7/2018 12/7/2017    ADMIN 1st VACCINE Routine  12/7/2018 12/7/2017            Who to contact     If you have questions or need follow up information about today's clinic visit or your schedule please contact [facility] [place] directly at [phone].  Normal or non-critical lab and imaging results will be communicated to you by MyChart, letter or phone within 4 business days after the clinic has received the results. If you do not hear from us within 7 days, please contact the clinic through MyChart or phone. If you have a critical or abnormal lab result, we will notify you by phone as soon as possible.  Submit refill requests through Next audience or call your pharmacy and they will forward the refill request to us. Please allow 3 business days for your refill to be completed.          Additional Information About Your Visit        MyChart Information     Next audience gives you secure access to your electronic health record. If you see a primary care provider, you can also send messages to your care team and make appointments. If you have questions, please call your primary care clinic.  If you do not have a primary care provider, please call [phone] and they will assist you.        Care EveryWhere ID     This is your Care EveryWhere ID. This could be used by other organizations to access your [facility]  "records  [id number]        Your Vitals Were     Pulse Temperature Respirations Height Pulse Oximetry BMI (Body Mass Index)    75 96.9  F (36.1  C) (Tympanic) 20 5' 8\" (1.727 m) 99% 19.92 kg/m2       Blood Pressure from Last 3 Encounters:   12/07/17 132/78   11/13/17 140/90   06/15/17 138/80    Weight from Last 3 Encounters:   12/07/17 131 lb (59.4 kg)   11/13/17 139 lb (63 kg)   06/15/17 138 lb (62.6 kg)                 Today's Medication Changes          These changes are accurate as of: 12/7/17  9:54 AM.  If you have any questions, ask your nurse or doctor.               Start taking these medicines.        Dose/Directions    guaiFENesin-codeine 100-10 MG/5ML Soln solution   Commonly known as:  ROBITUSSIN AC   Used for:  Acute bronchitis, unspecified organism   Started by:  Kassandra Chaparro MD        Dose:  1 tsp.   Take 5 mLs by mouth every 4 hours as needed for cough   Quantity:  120 mL   Refills:  0       levofloxacin 500 MG tablet   Commonly known as:  LEVAQUIN   Used for:  Acute recurrent maxillary sinusitis, Acute bronchitis, unspecified organism   Started by:  Kassandra Chaparro MD        Dose:  500 mg   Take 1 tablet (500 mg) by mouth daily   Quantity:  10 tablet   Refills:  1            Where to get your medicines      These medications were sent to [id number] - [place], [place] - [address] AT [facility] [address], [place] [address]     Phone:  [phone]     levofloxacin 500 MG tablet         Some of these will need a paper prescription and others can be bought over the counter.  Ask your nurse if you have questions.     Bring a paper prescription for each of these medications     guaiFENesin-codeine 100-10 MG/5ML Soln solution                Primary Care Provider Office Phone # Fax #    Kassandra Chaparro [phone]       [facility] [place] [address]  [place] [address]        Equal Access to Services     EVIE BURNS AH: Scot shah " Sovioletali, waaxda luqadaha, qaybta kaalmada bean, yan hawkins. So [facility] [phone].    ATENCIÓN: Si juan luis cardozo, tiene a parks disposición servicios gratuitos de asistencia lingüística. Hardik al [phone].    We comply with applicable federal civil rights laws and [place] laws. We do not discriminate on the basis of race, color, national origin, age, disability, sex, sexual orientation, or gender identity.            Thank you!     Thank you for [facility]  [facility] your [facility]. Our goal is always to provide you with excellent care. Hearing back from our patients is one way we can continue to improve our services. Please take a few minutes to complete the written survey that you may receive in the mail after your visit with us. Thank you!             Your Updated Medication List - Protect others around you: Learn how to safely use, store and throw away your medicines at www.disposemymeds.org.          This list is accurate as of: 12/7/17  9:54 AM.  Always use your most recent med list.                   Brand Name Dispense Instructions for use Diagnosis    ASPIR-LOW 81 MG EC tablet   Generic drug:  aspirin     90 tablet    TAKE ONE TABLET BY MOUTH DAILY    Health care maintenance       Azelaic Acid 15 % gel           clonazePAM 0.5 MG tablet    klonoPIN    60 tablet    Take 0.5-1 tablets (0.25-0.5 mg) by mouth 2 times daily as needed for anxiety    Anxiety       desonide 0.05 % ointment    DESOWEN          guaiFENesin-codeine 100-10 MG/5ML Soln solution    ROBITUSSIN AC    120 mL    Take 5 mLs by mouth every 4 hours as needed for cough    Acute bronchitis, unspecified organism       hydrochlorothiazide 12.5 MG Tabs tablet     90 tablet    TAKE 1 TABLET BY MOUTH DAILY    Essential hypertension       ibuprofen 600 MG tablet    ADVIL/MOTRIN    60 tablet    TAKE 1 TABLET BY MOUTH EVERY 8 HOURS AS NEEDED FOR PAIN    Pain       levofloxacin 500 MG tablet    LEVAQUIN     10 tablet    Take 1 tablet (500 mg) by mouth daily    Acute recurrent maxillary sinusitis, Acute bronchitis, unspecified organism       metoprolol 25 MG 24 hr tablet    TOPROL-XL    180 tablet    TAKE 2 TABLETS BY MOUTH DAILY    HTN (hypertension)       metroNIDAZOLE 0.75 % topical gel    METROGEL    45 g    Apply topically 2 times daily Apply h,s. And AM to central face - [id number]    Acne rosacea       mometasone 50 MCG/ACT spray    NASONEX    17 g    SPRAY 2 SPRAYS INTO EACH NOSTRIL DAILY AS NEEDED    Chronic maxillary sinusitis       nexIUM 40 MG CR capsule   Generic drug:  esomeprazole     90 capsule    TAKE 1 CAPSULE BY MOUTH DAILY    Gastroesophageal reflux disease without esophagitis       omeprazole 40 MG capsule    priLOSEC    90 capsule    Take 1 capsule (40 mg) by mouth daily    Gastroesophageal reflux disease without esophagitis       ondansetron 4 MG tablet    ZOFRAN    30 tablet    Take 1 tablet (4 mg) by mouth every 8 hours as needed for nausea    Nausea       PREMARIN 0.625 MG tablet   Generic drug:  estrogens (conjugated)      Take  by mouth daily.        PROVERA 10 MG tablet   Generic drug:  medroxyPROGESTERone      Take 10 mg by mouth. takes every other month days 1-12        tretinoin 0.025 % cream    RETIN-A     APPLY TOPICALLY AT BEDTIME

## 2017-12-08 ASSESSMENT — ANXIETY QUESTIONNAIRES: GAD7 TOTAL SCORE: 5

## 2017-12-13 DIAGNOSIS — F41.9 ANXIETY: ICD-10-CM

## 2017-12-13 DIAGNOSIS — B37.31 YEAST INFECTION OF THE VAGINA: ICD-10-CM

## 2017-12-18 RX ORDER — FLUCONAZOLE 150 MG/1
TABLET ORAL
Qty: 1 TABLET | Refills: 0 | Status: SHIPPED | OUTPATIENT
Start: 2017-12-18 | End: 2018-03-19

## 2017-12-18 RX ORDER — CLONAZEPAM 0.5 MG/1
TABLET ORAL
Qty: 60 TABLET | Refills: 0 | Status: SHIPPED | OUTPATIENT
Start: 2017-12-18 | End: 2018-03-19

## 2017-12-18 NOTE — TELEPHONE ENCOUNTER
Klonopin      Last Written Prescription Date: 6/15/17  Last Fill Quantity: 60,  # refills: 0   Last Office Visit with [facility] prescribing provider: 12/7/17                                                 Diflucan      Last Written Prescription Date: 11/13/17  Last Fill Quantity: 1,  # refills: 0     Dr. Chaparro to advise.

## 2017-12-20 ENCOUNTER — ALLIED HEALTH/NURSE VISIT (OUTPATIENT)
Dept: ALLERGY | Facility: OTHER | Age: 62
End: 2017-12-20
Attending: FAMILY MEDICINE
Payer: COMMERCIAL

## 2017-12-20 DIAGNOSIS — Z23 NEED FOR VACCINATION: Primary | ICD-10-CM

## 2017-12-20 PROCEDURE — 90746 HEPB VACCINE 3 DOSE ADULT IM: CPT

## 2017-12-20 PROCEDURE — 90471 IMMUNIZATION ADMIN: CPT

## 2018-02-02 ENCOUNTER — TELEPHONE (OUTPATIENT)
Dept: FAMILY MEDICINE | Facility: OTHER | Age: 63
End: 2018-02-02

## 2018-02-02 NOTE — TELEPHONE ENCOUNTER
2:30 PM    Reason for Call: Phone Call    Description: Pt called and states that she would like a call back regarding a referral that she needs.    Was an appointment offered for this call? No  If yes : Appointment type              Date    Preferred method for responding to this message: Telephone Call  What is your phone number ?    If we cannot reach you directly, may we leave a detailed response at the number you provided? Yes    Can this message wait until your PCP/provider returns, if available today? Not applicable, PCP is in     Dasia Gonzales

## 2018-02-06 ENCOUNTER — TELEPHONE (OUTPATIENT)
Dept: FAMILY MEDICINE | Facility: OTHER | Age: 63
End: 2018-02-06

## 2018-02-06 NOTE — TELEPHONE ENCOUNTER
11:24 AM    Reason for Call: Phone Call    Description: Patient is returning nurse's phone call.    Was an appointment offered for this call? No  If yes : Appointment type              Date    Preferred method for responding to this message: Telephone Call  What is your phone number ? [phone]    If we cannot reach you directly, may we leave a detailed response at the number you provided? Yes    Can this message wait until your PCP/provider returns, if available today? YES    Eve Watts

## 2018-02-08 NOTE — TELEPHONE ENCOUNTER
Called patient back and she needs a insurance referral to [facility], DOS 12/1/17 [facility]. Thank you

## 2018-03-19 ENCOUNTER — OFFICE VISIT (OUTPATIENT)
Dept: FAMILY MEDICINE | Facility: OTHER | Age: 63
End: 2018-03-19
Attending: FAMILY MEDICINE
Payer: COMMERCIAL

## 2018-03-19 VITALS
BODY MASS INDEX: 19.77 KG/M2 | OXYGEN SATURATION: 99 % | DIASTOLIC BLOOD PRESSURE: 80 MMHG | TEMPERATURE: 98.2 F | RESPIRATION RATE: 19 BRPM | SYSTOLIC BLOOD PRESSURE: 130 MMHG | HEART RATE: 82 BPM | WEIGHT: 130 LBS

## 2018-03-19 DIAGNOSIS — J01.10 ACUTE NON-RECURRENT FRONTAL SINUSITIS: Primary | ICD-10-CM

## 2018-03-19 DIAGNOSIS — F43.9 STRESS: ICD-10-CM

## 2018-03-19 DIAGNOSIS — M77.8 SHOULDER TENDONITIS, LEFT: ICD-10-CM

## 2018-03-19 DIAGNOSIS — L65.9 HAIR LOSS: ICD-10-CM

## 2018-03-19 DIAGNOSIS — F41.9 ANXIETY: ICD-10-CM

## 2018-03-19 DIAGNOSIS — B37.31 YEAST INFECTION OF THE VAGINA: ICD-10-CM

## 2018-03-19 LAB — TSH SERPL DL<=0.005 MIU/L-ACNC: 1.06 MU/L (ref 0.4–4)

## 2018-03-19 PROCEDURE — 99214 OFFICE O/P EST MOD 30 MIN: CPT | Performed by: FAMILY MEDICINE

## 2018-03-19 PROCEDURE — 84443 ASSAY THYROID STIM HORMONE: CPT | Performed by: FAMILY MEDICINE

## 2018-03-19 PROCEDURE — 36415 COLL VENOUS BLD VENIPUNCTURE: CPT | Performed by: FAMILY MEDICINE

## 2018-03-19 RX ORDER — BENZONATATE 200 MG/1
200 CAPSULE ORAL 3 TIMES DAILY PRN
Qty: 40 CAPSULE | Refills: 1 | Status: SHIPPED | OUTPATIENT
Start: 2018-03-19 | End: 2018-05-08

## 2018-03-19 RX ORDER — CEFPROZIL 500 MG/1
500 TABLET, FILM COATED ORAL 2 TIMES DAILY
Qty: 20 TABLET | Refills: 0 | Status: SHIPPED | OUTPATIENT
Start: 2018-03-19 | End: 2018-03-19 | Stop reason: ALTCHOICE

## 2018-03-19 RX ORDER — FLUCONAZOLE 150 MG/1
150 TABLET ORAL ONCE
Qty: 1 TABLET | Refills: 0 | Status: SHIPPED | OUTPATIENT
Start: 2018-03-19 | End: 2018-08-01

## 2018-03-19 RX ORDER — CLONAZEPAM 0.5 MG/1
TABLET ORAL
Qty: 60 TABLET | Refills: 0 | Status: SHIPPED | OUTPATIENT
Start: 2018-03-19 | End: 2023-07-06

## 2018-03-19 ASSESSMENT — PAIN SCALES - GENERAL: PAINLEVEL: NO PAIN (0)

## 2018-03-19 NOTE — PROGRESS NOTES
SUBJECTIVE:   Estela Delacruz is a 62 year old female who presents to clinic today for the following health issues:      RESPIRATORY SYMPTOMS      Duration: 1 day    Description  nasal congestion, rhinorrhea, facial pain/pressure and cough    Severity: moderate    Accompanying signs and symptoms: None    History (predisposing factors):  none    Precipitating or alleviating factors: None    Therapies tried and outcome:  none      Chief Complaint   Patient presents with     Sinus Problem  She has had significant issues with nasolacrimal duct infections in the past.      Hair Loss     in the last few months, her hair is thinning     Shoulder Pain     left shoulder, posteriorly. She has been lifting weights     Stress     she is undergoing a divorce and is having a lot of stress with mediation         Problem list and histories reviewed & adjusted, as indicated.  Additional history: as documented    Patient Active Problem List   Diagnosis     Varicose veins of legs     Essential hypertension     Sinusitis, chronic     Esophageal reflux     Basal cell carcinoma     Advanced care planning/counseling discussion     Telangiectasia of limb     Actinic keratosis     Past Surgical History:   Procedure Laterality Date     Arthroscopy right meniscal tear  2006    Dr Orozco     Arthroscopy right shoulder  2010     COLONOSCOPY  2008    Family history of colon cancer (mother). Negative. Repeat 2013     DILATION AND CURETTAGE       Removed mass left neck  2007    Dr Cervantes     Septoplasty and sinus surgery  2001     Marissa teeth removed         Social History   Substance Use Topics     Smoking status: Never Smoker     Smokeless tobacco: Never Used     Alcohol use Yes      Comment: occasionally     Family History   Problem Relation Age of Onset     CANCER Mother      colon     Cancer - colorectal Sister      Hypertension No family hx of      CEREBROVASCULAR DISEASE No family hx of          Current Outpatient Prescriptions    Medication Sig Dispense Refill     amoxicillin-clavulanate (AUGMENTIN) 875-125 MG per tablet Take 1 tablet by mouth 2 times daily 20 tablet 0     clonazePAM (KLONOPIN) 0.5 MG tablet TAKE 1/2 TO 1 TABLET BY MOUTH TWICE DAILY AS NEEDED FOR ANXIETY 60 tablet 0     benzonatate (TESSALON) 200 MG capsule Take 1 capsule (200 mg) by mouth 3 times daily as needed for cough 40 capsule 1     guaiFENesin-codeine (ROBITUSSIN AC) 100-10 MG/5ML SOLN solution Take 5 mLs by mouth every 4 hours as needed for cough 120 mL 0     mometasone (NASONEX) 50 MCG/ACT spray SPRAY 2 SPRAYS INTO EACH NOSTRIL DAILY AS NEEDED 17 g 11     hydrochlorothiazide 12.5 MG TABS tablet TAKE 1 TABLET BY MOUTH DAILY 90 tablet 2     metoprolol (TOPROL-XL) 25 MG 24 hr tablet TAKE 2 TABLETS BY MOUTH DAILY 180 tablet 2     omeprazole (PRILOSEC) 40 MG capsule Take 1 capsule (40 mg) by mouth daily 90 capsule 1     NEXIUM 40 MG CR capsule TAKE 1 CAPSULE BY MOUTH DAILY 90 capsule 0     ondansetron (ZOFRAN) 4 MG tablet Take 1 tablet (4 mg) by mouth every 8 hours as needed for nausea 30 tablet 1     ibuprofen (ADVIL/MOTRIN) 600 MG tablet TAKE 1 TABLET BY MOUTH EVERY 8 HOURS AS NEEDED FOR PAIN 60 tablet 1     Azelaic Acid 15 % gel        desonide (DESOWEN) 0.05 % ointment        tretinoin (RETIN-A) 0.025 % cream APPLY TOPICALLY AT BEDTIME       metroNIDAZOLE (METROGEL) 0.75 % gel Apply topically 2 times daily Apply h,s. And AM to central face - [id number] 45 g 3     ASPIR-LOW 81 MG EC tablet TAKE ONE TABLET BY MOUTH DAILY 90 tablet 2     medroxyPROGESTERone (PROVERA) 10 MG tablet Take 10 mg by mouth. takes every other month days 1-12       estrogens, conjugated, (PREMARIN) 0.625 MG tablet Take  by mouth daily.       [DISCONTINUED] clonazePAM (KLONOPIN) 0.5 MG tablet TAKE 1/2 TO 1 TABLET BY MOUTH TWICE DAILY AS NEEDED FOR ANXIETY 60 tablet 0     No Known Allergies    Reviewed and updated as needed this visit by clinical staff       Reviewed and updated as needed this  visit by Provider         ROS:  Constitutional, HEENT, cardiovascular, pulmonary, gi and gu systems are negative, except as otherwise noted.    OBJECTIVE:                                                    /80  Pulse 82  Temp 98.2  F (36.8  C) (Tympanic)  Resp 19  Wt 130 lb (59 kg)  SpO2 99%  Breastfeeding? No  BMI 19.77 kg/m2  Body mass index is 19.77 kg/(m^2).  GENERAL APPEARANCE: alert, no distress and fatigued  EYES: Eyes grossly normal to inspection, PERRL and conjunctivae and sclerae normal  HENT: ear canals and TM's normal and nose and mouth without ulcers or lesions  NECK: no adenopathy, no asymmetry, masses, or scars and thyroid normal to palpation  RESP: lungs clear to auscultation - no rales, rhonchi or wheezes  CV: regular rates and rhythm, normal S1 S2, no S3 or S4 and no murmur, click or rub  MS: extremities normal- no gross deformities noted and tenderness of the posterior left shoulder, full ROM, no effusion  SKIN: no suspicious lesions or rashes and Hair is thinning, with no discrete areas of alopecia  NEURO: Normal strength and tone, mentation intact and speech normal  PSYCH: mentation appears normal         ASSESSMENT/PLAN:                                                    1. Acute non-recurrent frontal sinusitis  Treat with sinusitis to cover for recurrent nasolacrimal duct infection as well, no signs of this today  - amoxicillin-clavulanate (AUGMENTIN) 875-125 MG per tablet; Take 1 tablet by mouth 2 times daily  Dispense: 20 tablet; Refill: 0  - benzonatate (TESSALON) 200 MG capsule; Take 1 capsule (200 mg) by mouth 3 times daily as needed for cough  Dispense: 40 capsule; Refill: 1    2. Yeast infection of the vagina  She asks for Diflucan for this    3. Hair loss  Check lab, this is most likely due to stress of the last year with her divorce. Advised watching this as it should improve, if not will refer to Dermatology  - TSH with free T4 reflex    4. Anxiety  renewed  - clonazePAM  (KLONOPIN) 0.5 MG tablet; TAKE 1/2 TO 1 TABLET BY MOUTH TWICE DAILY AS NEEDED FOR ANXIETY  Dispense: 60 tablet; Refill: 0    5. Shoulder tendonitis, left  Rest, Nabumtone prn    6. Stress  Of her divorce. She prefers no other medication at this time          Kassandra Chaparro MD  [facility]

## 2018-03-19 NOTE — MR AVS SNAPSHOT
After Visit Summary   3/19/2018    Estela Delacruz    MRN: [id number]           Patient Information     Date Of Birth          1955        Visit Information        Provider Department      3/19/2018 10:30 AM Kassandra Chaparro MD [facility]        Today's Diagnoses     Acute non-recurrent frontal sinusitis    -  1    Yeast infection of the vagina        Hair loss        Anxiety          Care Instructions    Take a general mulitvitamin and 2000 units of vitamin d daily          Follow-ups after your visit        Who to contact     If you have questions or need follow up information about today's clinic visit or your schedule please contact [facility] directly at [phone].  Normal or non-critical lab and imaging results will be communicated to you by MyChart, letter or phone within 4 business days after the clinic has received the results. If you do not hear from us within 7 days, please contact the clinic through E-Blinkhart or phone. If you have a critical or abnormal lab result, we will notify you by phone as soon as possible.  Submit refill requests through [employer] or call your pharmacy and they will forward the refill request to us. Please allow 3 business days for your refill to be completed.          Additional Information About Your Visit        MyChart Information     [employer] gives you secure access to your electronic health record. If you see a primary care provider, you can also send messages to your care team and make appointments. If you have questions, please call your primary care clinic.  If you do not have a primary care provider, please call [phone] and they will assist you.        Care EveryWhere ID     This is your Care EveryWhere ID. This could be used by other organizations to access your [place] medical records  [id number]        Your Vitals Were     Pulse Temperature Respirations Pulse Oximetry Breastfeeding? BMI (Body Mass Index)    82 98.2  F  (36.8  C) (Tympanic) 19 99% No 19.77 kg/m2       Blood Pressure from Last 3 Encounters:   03/19/18 130/80   12/07/17 132/78   11/13/17 140/90    Weight from Last 3 Encounters:   03/19/18 130 lb (59 kg)   12/07/17 131 lb (59.4 kg)   11/13/17 139 lb (63 kg)              We Performed the Following     TSH with free T4 reflex          Today's Medication Changes          These changes are accurate as of 3/19/18 11:13 AM.  If you have any questions, ask your nurse or doctor.               Start taking these medicines.        Dose/Directions    amoxicillin-clavulanate 875-125 MG per tablet   Commonly known as:  AUGMENTIN   Used for:  Acute non-recurrent frontal sinusitis   Started by:  Kassandra Chaparro MD        Dose:  1 tablet   Take 1 tablet by mouth 2 times daily   Quantity:  20 tablet   Refills:  0       benzonatate 200 MG capsule   Commonly known as:  TESSALON   Used for:  Acute non-recurrent frontal sinusitis   Started by:  Kassandra Chaparro MD        Dose:  200 mg   Take 1 capsule (200 mg) by mouth 3 times daily as needed for cough   Quantity:  40 capsule   Refills:  1         These medicines have changed or have updated prescriptions.        Dose/Directions    clonazePAM 0.5 MG tablet   Commonly known as:  klonoPIN   This may have changed:  See the new instructions.   Used for:  Anxiety   Changed by:  Kassandra Chaparro MD        TAKE 1/2 TO 1 TABLET BY MOUTH TWICE DAILY AS NEEDED FOR ANXIETY   Quantity:  60 tablet   Refills:  0            Where to get your medicines      [facility]  [place] - [address] AT [facility] [address] [address]     Phone:  [phone]     amoxicillin-clavulanate 875-125 MG per tablet    benzonatate 200 MG capsule         Some of these will need a paper prescription and others can be bought over the counter.  Ask your nurse if you have questions.     Bring a paper prescription for each of these medications      clonazePAM 0.5 MG tablet                Primary Care Provider Office Phone # Fax #    Kassandra Chaparro [phone] [address] [facility] [address]        Equal Access to Services     EVIE BURNS : Scot murrell preetio Sojacque, waaxda luqadaha, qaybta kaalmada adesoto, yan park tea hawkins. So [facility] [phone].    ATENCIÓN: Si habla español, tiene a parks disposición servicios gratuitos de asistencia lingüística. Llame al [phone].    We comply with applicable federal civil rights laws and [place] laws. We do not discriminate on the basis of race, color, national origin, age, disability, sex, sexual orientation, or gender identity.            Thank you!     Thank you for choosing [facility]  for your care. Our goal is always to provide you with excellent care. Hearing back from our patients is one way we can continue to improve our services. Please take a few minutes to complete the written survey that you may receive in the mail after your visit with us. Thank you!             Your Updated Medication List - Protect others around you: Learn how to safely use, store and throw away your medicines at www.disposemymeds.org.          This list is accurate as of 3/19/18 11:13 AM.  Always use your most recent med list.                   Brand Name Dispense Instructions for use Diagnosis    amoxicillin-clavulanate 875-125 MG per tablet    AUGMENTIN    20 tablet    Take 1 tablet by mouth 2 times daily    Acute non-recurrent frontal sinusitis       ASPIR-LOW 81 MG EC tablet   Generic drug:  aspirin     90 tablet    TAKE ONE TABLET BY MOUTH DAILY    Health care maintenance       Azelaic Acid 15 % gel           benzonatate 200 MG capsule    TESSALON    40 capsule    Take 1 capsule (200 mg) by mouth 3 times daily as needed for cough    Acute non-recurrent frontal sinusitis       clonazePAM 0.5 MG tablet    klonoPIN    60 tablet    TAKE 1/2 TO 1 TABLET BY  MOUTH TWICE DAILY AS NEEDED FOR ANXIETY    Anxiety       desonide 0.05 % ointment    DESOWEN          guaiFENesin-codeine 100-10 MG/5ML Soln solution    ROBITUSSIN AC    120 mL    Take 5 mLs by mouth every 4 hours as needed for cough    Acute bronchitis, unspecified organism       hydrochlorothiazide 12.5 MG Tabs tablet     90 tablet    TAKE 1 TABLET BY MOUTH DAILY    Essential hypertension       ibuprofen 600 MG tablet    ADVIL/MOTRIN    60 tablet    TAKE 1 TABLET BY MOUTH EVERY 8 HOURS AS NEEDED FOR PAIN    Pain       metoprolol succinate 25 MG 24 hr tablet    TOPROL-XL    180 tablet    TAKE 2 TABLETS BY MOUTH DAILY    HTN (hypertension)       metroNIDAZOLE 0.75 % topical gel    METROGEL    45 g    Apply topically 2 times daily Apply h,s. And AM to central face - [id number]    Acne rosacea       mometasone 50 MCG/ACT spray    NASONEX    17 g    SPRAY 2 SPRAYS INTO EACH NOSTRIL DAILY AS NEEDED    Chronic maxillary sinusitis       nexIUM 40 MG CR capsule   Generic drug:  esomeprazole     90 capsule    TAKE 1 CAPSULE BY MOUTH DAILY    Gastroesophageal reflux disease without esophagitis       omeprazole 40 MG capsule    priLOSEC    90 capsule    Take 1 capsule (40 mg) by mouth daily    Gastroesophageal reflux disease without esophagitis       ondansetron 4 MG tablet    ZOFRAN    30 tablet    Take 1 tablet (4 mg) by mouth every 8 hours as needed for nausea    Nausea       PREMARIN 0.625 MG tablet   Generic drug:  estrogens (conjugated)      Take  by mouth daily.        PROVERA 10 MG tablet   Generic drug:  medroxyPROGESTERone      Take 10 mg by mouth. takes every other month days 1-12        tretinoin 0.025 % cream    RETIN-A     APPLY TOPICALLY AT BEDTIME

## 2018-04-18 ENCOUNTER — TELEPHONE (OUTPATIENT)
Dept: FAMILY MEDICINE | Facility: OTHER | Age: 63
End: 2018-04-18

## 2018-04-18 NOTE — TELEPHONE ENCOUNTER
12:47 PM    Reason for Call: Phone Call    Description: Pt called and stated she needs an ins referral for her appt at [facility]. Provider is Allison Lyle and appt is 04/19/18. Please call pt back at [phone] if any questions.     Was an appointment offered for this call? No  If yes : Appointment type              Date    Preferred method for responding to this message: Telephone Call  What is your phone number ?    If we cannot reach you directly, may we leave a detailed response at the number you provided? Yes    Can this message wait until your PCP/provider returns, if available today? YES, pt aware provider out today    Rocio Kohler

## 2018-05-04 ENCOUNTER — TELEPHONE (OUTPATIENT)
Dept: FAMILY MEDICINE | Facility: OTHER | Age: 63
End: 2018-05-04

## 2018-05-04 NOTE — TELEPHONE ENCOUNTER
10:13 AM     Reason for Call: OVERBOOK    Patient is having the following symptoms: neck issues for 2 weeks.    The patient is requesting an appointment for ASAP with Dr.Susan Chaparro.    Was an appointment offered for this call? Yes  If yes : Appointment type: short              Date: 05/29/18    Preferred method for responding to this message: Telephone Call  What is your phone number ?[phone]    If we cannot reach you directly, may we leave a detailed response at the number you provided? Yes    Can this message wait until your PCP/provider returns, if unavailable today? Yes, PCP is out     Dasia Patel

## 2018-05-04 NOTE — TELEPHONE ENCOUNTER
LVM for patient with apt date, and time.  I ask that she call scheduling back if this did not work for her

## 2018-05-04 NOTE — TELEPHONE ENCOUNTER
Please schedule patient for date/time: 2:30 on 5/8/18, arrival time 2:15 for neck issue only as we are working her into a spot    Have patient go to ER/Urgent Care Center. Urgent Care hours are 9:30 am to 8 pm, open 7 days a week. No.    Provider will call patient.No.    Other:

## 2018-05-08 ENCOUNTER — RADIANT APPOINTMENT (OUTPATIENT)
Dept: GENERAL RADIOLOGY | Facility: OTHER | Age: 63
End: 2018-05-08
Attending: FAMILY MEDICINE
Payer: COMMERCIAL

## 2018-05-08 ENCOUNTER — OFFICE VISIT (OUTPATIENT)
Dept: FAMILY MEDICINE | Facility: OTHER | Age: 63
End: 2018-05-08
Attending: FAMILY MEDICINE
Payer: COMMERCIAL

## 2018-05-08 VITALS
OXYGEN SATURATION: 100 % | SYSTOLIC BLOOD PRESSURE: 126 MMHG | WEIGHT: 130 LBS | HEIGHT: 68 IN | BODY MASS INDEX: 19.7 KG/M2 | HEART RATE: 78 BPM | RESPIRATION RATE: 18 BRPM | DIASTOLIC BLOOD PRESSURE: 78 MMHG

## 2018-05-08 DIAGNOSIS — S16.1XXA STRAIN OF NECK MUSCLE, INITIAL ENCOUNTER: Primary | ICD-10-CM

## 2018-05-08 DIAGNOSIS — S16.1XXA STRAIN OF NECK MUSCLE, INITIAL ENCOUNTER: ICD-10-CM

## 2018-05-08 PROCEDURE — 72040 X-RAY EXAM NECK SPINE 2-3 VW: CPT | Mod: TC

## 2018-05-08 PROCEDURE — 99213 OFFICE O/P EST LOW 20 MIN: CPT | Performed by: FAMILY MEDICINE

## 2018-05-08 RX ORDER — NABUMETONE 500 MG/1
500 TABLET, FILM COATED ORAL 2 TIMES DAILY PRN
Qty: 60 TABLET | Refills: 1 | Status: SHIPPED | OUTPATIENT
Start: 2018-05-08 | End: 2022-08-01

## 2018-05-08 ASSESSMENT — ANXIETY QUESTIONNAIRES
3. WORRYING TOO MUCH ABOUT DIFFERENT THINGS: SEVERAL DAYS
5. BEING SO RESTLESS THAT IT IS HARD TO SIT STILL: NOT AT ALL
GAD7 TOTAL SCORE: 3
1. FEELING NERVOUS, ANXIOUS, OR ON EDGE: SEVERAL DAYS
7. FEELING AFRAID AS IF SOMETHING AWFUL MIGHT HAPPEN: NOT AT ALL
4. TROUBLE RELAXING: NOT AT ALL
2. NOT BEING ABLE TO STOP OR CONTROL WORRYING: SEVERAL DAYS
IF YOU CHECKED OFF ANY PROBLEMS ON THIS QUESTIONNAIRE, HOW DIFFICULT HAVE THESE PROBLEMS MADE IT FOR YOU TO DO YOUR WORK, TAKE CARE OF THINGS AT HOME, OR GET ALONG WITH OTHER PEOPLE: NOT DIFFICULT AT ALL
6. BECOMING EASILY ANNOYED OR IRRITABLE: NOT AT ALL

## 2018-05-08 ASSESSMENT — PAIN SCALES - GENERAL: PAINLEVEL: MILD PAIN (2)

## 2018-05-08 NOTE — PROGRESS NOTES
SUBJECTIVE:   Estela Delacruz is a 63 year old female who presents to clinic today for the following health issues:      Neck Pain      Duration: 2 weeks    Description:  Location: mid neck  Radiation: none    Intensity:  moderate    Accompanying signs and symptoms: none    History (similar episodes/previous evaluation): None    Precipitating or alleviating factors: None    Therapies tried and outcome: Ibuprofen is helpful  She was carrying heavy boxes up the hill in her yard 2 weeks ago and is having trouble since. She will awaken with pain and stiffness and initially couldn't turn her head to the left. This is still a problem         Problem list and histories reviewed & adjusted, as indicated.  Additional history: as documented    Patient Active Problem List   Diagnosis     Varicose veins of legs     Essential hypertension     Sinusitis, chronic     Esophageal reflux     Basal cell carcinoma     Advanced care planning/counseling discussion     Telangiectasia of limb     Actinic keratosis     Personal history of malignant neoplasm of skin     Past Surgical History:   Procedure Laterality Date     Arthroscopy right meniscal tear  2006    Dr Orozco     Arthroscopy right shoulder  2010     COLONOSCOPY  2008    Family history of colon cancer (mother). Negative. Repeat 2013     DILATION AND CURETTAGE       Removed mass left neck  2007    Dr Cervantes     Septoplasty and sinus surgery  2001     [place] teeth removed         Social History   Substance Use Topics     Smoking status: Never Smoker     Smokeless tobacco: Never Used     Alcohol use Yes      Comment: occasionally     Family History   Problem Relation Age of Onset     CANCER Mother      colon     Cancer - colorectal Sister      Hypertension No family hx of      CEREBROVASCULAR DISEASE No family hx of          Current Outpatient Prescriptions   Medication Sig Dispense Refill     ASPIR-LOW 81 MG EC tablet TAKE ONE TABLET BY MOUTH DAILY 90 tablet 2     clonazePAM  (KLONOPIN) 0.5 MG tablet TAKE 1/2 TO 1 TABLET BY MOUTH TWICE DAILY AS NEEDED FOR ANXIETY 60 tablet 0     desonide (DESOWEN) 0.05 % ointment        estrogens, conjugated, (PREMARIN) 0.625 MG tablet Take  by mouth daily.       hydrochlorothiazide 12.5 MG TABS tablet TAKE 1 TABLET BY MOUTH DAILY 90 tablet 2     ibuprofen (ADVIL/MOTRIN) 600 MG tablet TAKE 1 TABLET BY MOUTH EVERY 8 HOURS AS NEEDED FOR PAIN 60 tablet 1     medroxyPROGESTERone (PROVERA) 10 MG tablet Take 10 mg by mouth. takes every other month days 1-12       metoprolol (TOPROL-XL) 25 MG 24 hr tablet TAKE 2 TABLETS BY MOUTH DAILY 180 tablet 2     metroNIDAZOLE (METROGEL) 0.75 % gel Apply topically 2 times daily Apply h,s. And AM to central face - [id number] 45 g 3     mometasone (NASONEX) 50 MCG/ACT spray SPRAY 2 SPRAYS INTO EACH NOSTRIL DAILY AS NEEDED 17 g 11     nabumetone (RELAFEN) 500 MG tablet Take 1 tablet (500 mg) by mouth 2 times daily as needed for moderate pain 60 tablet 1     NEXIUM 40 MG CR capsule TAKE 1 CAPSULE BY MOUTH DAILY 90 capsule 0     omeprazole (PRILOSEC) 40 MG capsule Take 1 capsule (40 mg) by mouth daily 90 capsule 1     ondansetron (ZOFRAN) 4 MG tablet Take 1 tablet (4 mg) by mouth every 8 hours as needed for nausea 30 tablet 1     tretinoin (RETIN-A) 0.025 % cream APPLY TOPICALLY AT BEDTIME       No Known Allergies  BP Readings from Last 3 Encounters:   05/08/18 126/78   03/19/18 130/80   12/07/17 132/78    Wt Readings from Last 3 Encounters:   05/08/18 130 lb (59 kg)   03/19/18 130 lb (59 kg)   12/07/17 131 lb (59.4 kg)                    Reviewed and updated as needed this visit by clinical staff  Allergies  Meds       Reviewed and updated as needed this visit by Provider         ROS:  CONSTITUTIONAL:NEGATIVE for fever, chills, change in weight  INTEGUMENTARY/SKIN: NEGATIVE for worrisome rashes, moles or lesions  NEURO: NEGATIVE for weakness, dizziness or paresthesias  PSYCHIATRIC: NEGATIVE for changes in mood or  "affect    OBJECTIVE:                                                    /78  Pulse 78  Resp 18  Ht 5' 8\" (1.727 m)  Wt 130 lb (59 kg)  SpO2 100%  BMI 19.77 kg/m2  Body mass index is 19.77 kg/(m^2).  GENERAL APPEARANCE: healthy, alert and no distress  ORTHO: Cervical Spine Exam: Inspection: normal cervical lordosis  Tender:  left paracervical muscles, left trapezius muscles  Non-tender:  occipital nerves, spinous processes, scapula  Range of Motion:  flexion:  decreased, painful, extension: decreased, painful, left lateral rotation:  decreased, painful, right lateral rotation:  decreased, painful  Strength: Full strength of all neck muscles  NEURO: Normal strength and tone, mentation intact and speech normal  PSYCH: mentation appears normal and affect normal/bright         ASSESSMENT/PLAN:                                                    1. Strain of neck muscle, initial encounter  Will start nabumetone bid with food and refer to PT, x rays today as well.   - nabumetone (RELAFEN) 500 MG tablet; Take 1 tablet (500 mg) by mouth 2 times daily as needed for moderate pain  Dispense: 60 tablet; Refill: 1  - PHYSICAL THERAPY REFERRAL      Follow up with Provider - if not improving or for concerns    Kassandra Chaparro MD  [facility] [place]    "

## 2018-05-08 NOTE — NURSING NOTE
"Chief Complaint   Patient presents with     Neck Pain       Initial /78  Pulse 78  Resp 18  Ht 5' 8\" (1.727 m)  Wt 130 lb (59 kg)  SpO2 100%  BMI 19.77 kg/m2 Estimated body mass index is 19.77 kg/(m^2) as calculated from the following:    Height as of this encounter: 5' 8\" (1.727 m).    Weight as of this encounter: 130 lb (59 kg).  Medication Reconciliation: complete    Ashlee Carvajal LPN    "

## 2018-05-09 ASSESSMENT — PATIENT HEALTH QUESTIONNAIRE - PHQ9: SUM OF ALL RESPONSES TO PHQ QUESTIONS 1-9: 0

## 2018-05-09 ASSESSMENT — ANXIETY QUESTIONNAIRES: GAD7 TOTAL SCORE: 3

## 2018-05-15 ENCOUNTER — OFFICE VISIT (OUTPATIENT)
Dept: FAMILY MEDICINE | Facility: OTHER | Age: 63
End: 2018-05-15
Attending: FAMILY MEDICINE
Payer: COMMERCIAL

## 2018-05-15 ENCOUNTER — TELEPHONE (OUTPATIENT)
Dept: FAMILY MEDICINE | Facility: OTHER | Age: 63
End: 2018-05-15

## 2018-05-15 ENCOUNTER — APPOINTMENT (OUTPATIENT)
Dept: LAB | Facility: OTHER | Age: 63
End: 2018-05-15
Attending: FAMILY MEDICINE
Payer: COMMERCIAL

## 2018-05-15 VITALS
RESPIRATION RATE: 19 BRPM | TEMPERATURE: 98.4 F | SYSTOLIC BLOOD PRESSURE: 132 MMHG | HEART RATE: 64 BPM | DIASTOLIC BLOOD PRESSURE: 78 MMHG

## 2018-05-15 DIAGNOSIS — N30.00 ACUTE CYSTITIS WITHOUT HEMATURIA: ICD-10-CM

## 2018-05-15 DIAGNOSIS — R30.0 DYSURIA: Primary | ICD-10-CM

## 2018-05-15 LAB
ALBUMIN UR-MCNC: 30 MG/DL
APPEARANCE UR: ABNORMAL
BACTERIA #/AREA URNS HPF: ABNORMAL /HPF
BILIRUB UR QL STRIP: NEGATIVE
COLOR UR AUTO: YELLOW
GLUCOSE UR STRIP-MCNC: NEGATIVE MG/DL
HGB UR QL STRIP: ABNORMAL
KETONES UR STRIP-MCNC: NEGATIVE MG/DL
LEUKOCYTE ESTERASE UR QL STRIP: ABNORMAL
MUCOUS THREADS #/AREA URNS LPF: PRESENT /LPF
NITRATE UR QL: NEGATIVE
PH UR STRIP: 5.5 PH (ref 4.7–8)
RBC #/AREA URNS AUTO: 49 /HPF (ref 0–2)
SOURCE: ABNORMAL
SP GR UR STRIP: 1.02 (ref 1–1.03)
SQUAMOUS #/AREA URNS AUTO: 11 /HPF (ref 0–1)
UROBILINOGEN UR STRIP-MCNC: 2 MG/DL (ref 0–2)
WBC #/AREA URNS AUTO: >182 /HPF (ref 0–5)

## 2018-05-15 PROCEDURE — 81001 URINALYSIS AUTO W/SCOPE: CPT | Performed by: FAMILY MEDICINE

## 2018-05-15 PROCEDURE — 99213 OFFICE O/P EST LOW 20 MIN: CPT | Performed by: FAMILY MEDICINE

## 2018-05-15 RX ORDER — SULFAMETHOXAZOLE/TRIMETHOPRIM 800-160 MG
1 TABLET ORAL 2 TIMES DAILY
Qty: 10 TABLET | Refills: 0 | Status: SHIPPED | OUTPATIENT
Start: 2018-05-15 | End: 2018-05-20

## 2018-05-15 ASSESSMENT — ANXIETY QUESTIONNAIRES
3. WORRYING TOO MUCH ABOUT DIFFERENT THINGS: SEVERAL DAYS
4. TROUBLE RELAXING: NOT AT ALL
2. NOT BEING ABLE TO STOP OR CONTROL WORRYING: NOT AT ALL
6. BECOMING EASILY ANNOYED OR IRRITABLE: NOT AT ALL
7. FEELING AFRAID AS IF SOMETHING AWFUL MIGHT HAPPEN: NOT AT ALL
5. BEING SO RESTLESS THAT IT IS HARD TO SIT STILL: NOT AT ALL
GAD7 TOTAL SCORE: 2
IF YOU CHECKED OFF ANY PROBLEMS ON THIS QUESTIONNAIRE, HOW DIFFICULT HAVE THESE PROBLEMS MADE IT FOR YOU TO DO YOUR WORK, TAKE CARE OF THINGS AT HOME, OR GET ALONG WITH OTHER PEOPLE: NOT DIFFICULT AT ALL
1. FEELING NERVOUS, ANXIOUS, OR ON EDGE: SEVERAL DAYS

## 2018-05-15 ASSESSMENT — PAIN SCALES - GENERAL: PAINLEVEL: MILD PAIN (2)

## 2018-05-15 NOTE — PROGRESS NOTES
SUBJECTIVE:   Estela Delacruz is a 63 year old female who presents to clinic today for the following health issues:      URINARY TRACT SYMPTOMS      Duration: 2 days    Description  dysuria, frequency and urgency    Intensity:  mild    Accompanying signs and symptoms:  Fever/chills: no   Flank pain no   Nausea and vomiting: no   Vaginal symptoms: none  Abdominal/Pelvic Pain: no     History  History of frequent UTI's: YES  History of kidney stones: no   Sexually Active: YES  Possibility of pregnancy: No    Precipitating or alleviating factors: None    Therapies tried and outcome: none   Outcome:           Problem list and histories reviewed & adjusted, as indicated.  Additional history: as documented    Patient Active Problem List   Diagnosis     Varicose veins of legs     Essential hypertension     Sinusitis, chronic     Esophageal reflux     Basal cell carcinoma     Advanced care planning/counseling discussion     Telangiectasia of limb     Actinic keratosis     Personal history of malignant neoplasm of skin     Past Surgical History:   Procedure Laterality Date     Arthroscopy right meniscal tear  2006    Dr Orozco     Arthroscopy right shoulder  2010     COLONOSCOPY  2008    Family history of colon cancer (mother). Negative. Repeat 2013     DILATION AND CURETTAGE       Removed mass left neck  2007    Dr Cervantes     Septoplasty and sinus surgery  2001     Central teeth removed         Social History   Substance Use Topics     Smoking status: Never Smoker     Smokeless tobacco: Never Used     Alcohol use Yes      Comment: occasionally     Family History   Problem Relation Age of Onset     CANCER Mother      colon     Cancer - colorectal Sister      Hypertension No family hx of      CEREBROVASCULAR DISEASE No family hx of          Current Outpatient Prescriptions   Medication Sig Dispense Refill     ASPIR-LOW 81 MG EC tablet TAKE ONE TABLET BY MOUTH DAILY 90 tablet 2     clonazePAM (KLONOPIN) 0.5 MG tablet TAKE 1/2  TO 1 TABLET BY MOUTH TWICE DAILY AS NEEDED FOR ANXIETY 60 tablet 0     desonide (DESOWEN) 0.05 % ointment        estrogens, conjugated, (PREMARIN) 0.625 MG tablet Take  by mouth daily.       hydrochlorothiazide 12.5 MG TABS tablet TAKE 1 TABLET BY MOUTH DAILY 90 tablet 2     ibuprofen (ADVIL/MOTRIN) 600 MG tablet TAKE 1 TABLET BY MOUTH EVERY 8 HOURS AS NEEDED FOR PAIN 60 tablet 1     medroxyPROGESTERone (PROVERA) 10 MG tablet Take 10 mg by mouth. takes every other month days 1-12       metoprolol (TOPROL-XL) 25 MG 24 hr tablet TAKE 2 TABLETS BY MOUTH DAILY 180 tablet 2     metroNIDAZOLE (METROGEL) 0.75 % gel Apply topically 2 times daily Apply h,s. And AM to central face - [id number] 45 g 3     mometasone (NASONEX) 50 MCG/ACT spray SPRAY 2 SPRAYS INTO EACH NOSTRIL DAILY AS NEEDED 17 g 11     nabumetone (RELAFEN) 500 MG tablet Take 1 tablet (500 mg) by mouth 2 times daily as needed for moderate pain 60 tablet 1     NEXIUM 40 MG CR capsule TAKE 1 CAPSULE BY MOUTH DAILY 90 capsule 0     omeprazole (PRILOSEC) 40 MG capsule Take 1 capsule (40 mg) by mouth daily 90 capsule 1     ondansetron (ZOFRAN) 4 MG tablet Take 1 tablet (4 mg) by mouth every 8 hours as needed for nausea 30 tablet 1     sulfamethoxazole-trimethoprim (BACTRIM DS/SEPTRA DS) 800-160 MG per tablet Take 1 tablet by mouth 2 times daily for 5 days 10 tablet 0     tretinoin (RETIN-A) 0.025 % cream APPLY TOPICALLY AT BEDTIME       No Known Allergies  BP Readings from Last 3 Encounters:   05/15/18 132/78   05/08/18 126/78   03/19/18 130/80    Wt Readings from Last 3 Encounters:   05/08/18 130 lb (59 kg)   03/19/18 130 lb (59 kg)   12/07/17 131 lb (59.4 kg)                    Reviewed and updated as needed this visit by clinical staff  Tobacco  Allergies  Meds  Med Hx  Surg Hx  Fam Hx  Soc Hx      Reviewed and updated as needed this visit by Provider         ROS:  CONSTITUTIONAL:NEGATIVE for fever, chills, change in weight  NEURO: NEGATIVE for weakness,  dizziness or paresthesias  PSYCHIATRIC: NEGATIVE for changes in mood or affect    OBJECTIVE:                                                    /78  Pulse 64  Temp 98.4  F (36.9  C) (Tympanic)  Resp 19  There is no height or weight on file to calculate BMI.  GENERAL APPEARANCE: healthy, alert and no distress  MS: extremities normal- no gross deformities noted and no flank tenderness  NEURO: Normal strength and tone, mentation intact and speech normal  PSYCH: mentation appears normal and affect normal/bright         ASSESSMENT/PLAN:                                                    1. Dysuria    - UA reflex to Microscopic and Culture - [facility]    2. Acute cystitis without hematuria  Treat with bactrim, follow up if not improved. It has been many years since she had her last UTI  - sulfamethoxazole-trimethoprim (BACTRIM DS/SEPTRA DS) 800-160 MG per tablet; Take 1 tablet by mouth 2 times daily for 5 days  Dispense: 10 tablet; Refill: 0      Follow up with Provider - pam Chaparro MD  [facility] [place]

## 2018-05-15 NOTE — TELEPHONE ENCOUNTER
8:55 AM    Reason for Call: OVERBOOK    Patient is having the following symptoms: Possible UTI for 2 days    The patient is requesting an appointment for Today  with Dr. Kassandra Chaparro    Was an appointment offered for this call?   No    Preferred method for responding to this message: [phone]    If we cannot reach you directly, may we leave a detailed response at the number you provided? Yes      Gisella Khalil

## 2018-05-15 NOTE — TELEPHONE ENCOUNTER
Please schedule patient for date/time:Have patient come right now. Please note to have patient go to lab first. Patient aware of time, just put on schedule for 9:30 or 9:45.     Have patient go to ER/Urgent Care Center. Urgent Care hours are 9:30 am to 8 pm, open 7 days a week. No.    Provider will call patient.No.    Other:

## 2018-05-15 NOTE — MR AVS SNAPSHOT
After Visit Summary   5/15/2018    Estela Delacruz    MRN: [id number]           Patient Information     Date Of Birth          1955        Visit Information        Provider Department      5/15/2018 9:30 AM Kassandra Chaparro MD [facility]        Today's Diagnoses     Dysuria    -  1    Acute cystitis without hematuria           Follow-ups after your visit        Your next 10 appointments already scheduled     May 21, 2018  3:30 PM CDT   (Arrive by 3:15 PM)   Ortho Eval with Juliana Reeves, PT   [facility] ([facility] )    [address] [place] [id number]   [phone]              Who to contact     If you have questions or need follow up information about today's clinic visit or your schedule please contact [facility] directly at [phone].  Normal or non-critical lab and imaging results will be communicated to you by MyChart, letter or phone within 4 business days after the clinic has received the results. If you do not hear from us within 7 days, please contact the clinic through MyChart or phone. If you have a critical or abnormal lab result, we will notify you by phone as soon as possible.  Submit refill requests through [employer] or call your pharmacy and they will forward the refill request to us. Please allow 3 business days for your refill to be completed.          Additional Information About Your Visit        MyChart Information     [employer] gives you secure access to your electronic health record. If you see a primary care provider, you can also send messages to your care team and make appointments. If you have questions, please call your primary care clinic.  If you do not have a primary care provider, please call [phone] and they will assist you.        Care EveryWhere ID     This is your Care EveryWhere ID. This could be used by other organizations to access your [place] medical records  [id number]        Your Vitals  Were     Pulse Temperature Respirations             64 98.4  F (36.9  C) (Tympanic) 19          Blood Pressure from Last 3 Encounters:   05/15/18 132/78   05/08/18 126/78   03/19/18 130/80    Weight from Last 3 Encounters:   05/08/18 130 lb (59 kg)   03/19/18 130 lb (59 kg)   12/07/17 131 lb (59.4 kg)              We Performed the Following     UA reflex to Microscopic and Culture - [place]          Today's Medication Changes          These changes are accurate as of 5/15/18 10:25 AM.  If you have any questions, ask your nurse or doctor.               Start taking these medicines.        Dose/Directions    sulfamethoxazole-trimethoprim 800-160 MG per tablet   Commonly known as:  BACTRIM DS/SEPTRA DS   Used for:  Acute cystitis without hematuria   Started by:  Kassandra Chaparro MD        Dose:  1 tablet   Take 1 tablet by mouth 2 times daily for 5 days   Quantity:  10 tablet   Refills:  0            Where to get your medicines      [facility] [address] [place] [address] [address]  AT [facility] [address]  [address], [facility] [address]     Phone:  [phone]     sulfamethoxazole-trimethoprim 800-160 MG per tablet                Primary Care Provider Office Phone # Fax #    Kassandra Chaparro [phone]       [facility] [address]        Equal Access to Services     [facility] AH: Hadii aad ku hadasho Soomaali, waaxda luqadaha, qaybta kaalmada adeegyada, yan gillespie haygiorgi metcalf . So [facility] [phone].    ATENCIÓN: Si habla español, tiene a parks disposición servicios gratuitos de asistencia lingüística. Llame al [phone].    We comply with applicable federal civil rights laws and [place] laws. We do not discriminate on the basis of race, color, national origin, age, disability, sex, sexual orientation, or gender identity.            Thank you!     Thank you for choosing [facility]  for your care. Our goal  is always to provide you with excellent care. Hearing back from our patients is one way we can continue to improve our services. Please take a few minutes to complete the written survey that you may receive in the mail after your visit with us. Thank you!             Your Updated Medication List - Protect others around you: Learn how to safely use, store and throw away your medicines at www.disposemymeds.org.          This list is accurate as of 5/15/18 10:25 AM.  Always use your most recent med list.                   Brand Name Dispense Instructions for use Diagnosis    ASPIR-LOW 81 MG EC tablet   Generic drug:  aspirin     90 tablet    TAKE ONE TABLET BY MOUTH DAILY    Health care maintenance       clonazePAM 0.5 MG tablet    klonoPIN    60 tablet    TAKE 1/2 TO 1 TABLET BY MOUTH TWICE DAILY AS NEEDED FOR ANXIETY    Anxiety       desonide 0.05 % ointment    DESOWEN          hydrochlorothiazide 12.5 MG Tabs tablet     90 tablet    TAKE 1 TABLET BY MOUTH DAILY    Essential hypertension       ibuprofen 600 MG tablet    ADVIL/MOTRIN    60 tablet    TAKE 1 TABLET BY MOUTH EVERY 8 HOURS AS NEEDED FOR PAIN    Pain       metoprolol succinate 25 MG 24 hr tablet    TOPROL-XL    180 tablet    TAKE 2 TABLETS BY MOUTH DAILY    HTN (hypertension)       metroNIDAZOLE 0.75 % topical gel    METROGEL    45 g    Apply topically 2 times daily Apply h,s. And AM to central face - [id number]    Acne rosacea       mometasone 50 MCG/ACT spray    NASONEX    17 g    SPRAY 2 SPRAYS INTO EACH NOSTRIL DAILY AS NEEDED    Chronic maxillary sinusitis       nabumetone 500 MG tablet    RELAFEN    60 tablet    Take 1 tablet (500 mg) by mouth 2 times daily as needed for moderate pain    Strain of neck muscle, initial encounter       nexIUM 40 MG CR capsule   Generic drug:  esomeprazole     90 capsule    TAKE 1 CAPSULE BY MOUTH DAILY    Gastroesophageal reflux disease without esophagitis       omeprazole 40 MG capsule    priLOSEC    90 capsule    Take  1 capsule (40 mg) by mouth daily    Gastroesophageal reflux disease without esophagitis       ondansetron 4 MG tablet    ZOFRAN    30 tablet    Take 1 tablet (4 mg) by mouth every 8 hours as needed for nausea    Nausea       PREMARIN 0.625 MG tablet   Generic drug:  estrogens (conjugated)      Take  by mouth daily.        PROVERA 10 MG tablet   Generic drug:  medroxyPROGESTERone      Take 10 mg by mouth. takes every other month days 1-12        sulfamethoxazole-trimethoprim 800-160 MG per tablet    BACTRIM DS/SEPTRA DS    10 tablet    Take 1 tablet by mouth 2 times daily for 5 days    Acute cystitis without hematuria       tretinoin 0.025 % cream    RETIN-A     APPLY TOPICALLY AT BEDTIME

## 2018-05-16 ASSESSMENT — PATIENT HEALTH QUESTIONNAIRE - PHQ9: SUM OF ALL RESPONSES TO PHQ QUESTIONS 1-9: 0

## 2018-05-16 ASSESSMENT — ANXIETY QUESTIONNAIRES: GAD7 TOTAL SCORE: 2

## 2018-05-21 ENCOUNTER — HOSPITAL ENCOUNTER (OUTPATIENT)
Dept: PHYSICAL THERAPY | Facility: HOSPITAL | Age: 63
Setting detail: THERAPIES SERIES
End: 2018-05-21
Attending: FAMILY MEDICINE
Payer: COMMERCIAL

## 2018-05-21 PROCEDURE — 97110 THERAPEUTIC EXERCISES: CPT | Mod: GP

## 2018-05-21 PROCEDURE — 40000718 ZZHC STATISTIC PT DEPARTMENT ORTHO VISIT

## 2018-05-21 PROCEDURE — 97161 PT EVAL LOW COMPLEX 20 MIN: CPT | Mod: GP

## 2018-05-23 ENCOUNTER — HOSPITAL ENCOUNTER (OUTPATIENT)
Dept: PHYSICAL THERAPY | Facility: HOSPITAL | Age: 63
Setting detail: THERAPIES SERIES
End: 2018-05-23
Attending: FAMILY MEDICINE
Payer: COMMERCIAL

## 2018-05-23 PROCEDURE — 97110 THERAPEUTIC EXERCISES: CPT | Mod: GP

## 2018-05-23 PROCEDURE — 40000718 ZZHC STATISTIC PT DEPARTMENT ORTHO VISIT

## 2018-05-23 NOTE — PROGRESS NOTES
05/21/18 1500   General Information   Type of Visit Initial OP Ortho PT Evaluation   Start of Care Date 05/21/18   Referring Physician CARMEN Chaparro   Orders Evaluate and Treat   Date of Order 05/15/18   Insurance Type [employer]   Insurance Comments/Visits Authorized BC/BS secondary- up to 11 visits  ([employer] primary)   Medical Diagnosis cervical strain.    Surgical/Medical history reviewed Yes   Precautions/Limitations no known precautions/limitations   Body Part(s)   Body Part(s) Cervical Spine   Presentation and Etiology   Pertinent history of current problem (include personal factors and/or comorbidities that impact the POC) This 62 y/o female was hauling heavy  up hill approx 3 weeks ago. Felt severe pain with inability to turn neck L, Went to see Dr CARMEN Chaparro on and was referred to PT.  Pt is having improved CROM and less pain since injury. Having difficulty sleeping. denies any arm pain or headaches. Working on stretching it herself but has mainly L sided trap and neck pain. Pmhx- R shld labral tear/biceps repair. Currently having no shld pain or UE symptoms. Denies headaches   Impairments A. Pain;D. Decreased ROM;E. Decreased flexibility;F. Decreased strength and endurance   Functional Limitations perform desired leisure / sports activities;perform activities of daily living   Symptom Location neck,    How/Where did it occur While carrying an object   Onset date of current episode/exacerbation (approx 3 weeks ago)   Chronicity New   Pain rating (0-10 point scale) Best (/10);Worst (/10)   Best (/10) 3   Worst (/10) 8   Pain quality B. Dull;C. Aching   Frequency of pain/symptoms A. Constant  (sleeps in all positions)   Pain/symptoms exacerbated by K. Home tasks;G. Certain positions;D. Carrying   Pain/symptoms eased by I. OTC medication(s);G. Heat;E. Changing positions;D. Nothing  (ibuprofen , hot tub)   Progression of symptoms since onset: Improved   Current / Previous  Interventions   Diagnostic Tests: X-ray   X-ray Results unremarkable   Prior Level of Function   Prior Level of Function-ADLs painfree.   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   Current equipment-Gait/Locomotion None   Fall Risk Screen   Have you fallen 2 or more times in the past year? No   Have you fallen and had an injury in the past year? No   Is patient a fall risk? No   System Outcome Measures   Outcome Measures Low Back Pain (see Oswestry and Jesus Manuel)   Functional Scales   Functional Scales (Neck disability score 26%. )   Cervical Spine   Observation appears in min acute pain. able to converse.   Integumentary  intact   Posture slightly fwd head, guarded shlds with incrd trap activation   Cervical Flexion ROM full   Cervical Extension ROM full   Cervical Right Side Bending ROM 25% of wnl   Cervical Left Side Bending ROM 50% of wnl   Cervical Right Rotation ROM 75% of wnl   Cervical Left Rotation ROM 50% of wnl   Shoulder Shrug (C2-C4) Strength 5   Shoulder Abd (C5) Strength 4   Elbow Flexion (C5, C6) Strength 5   Elbow Extension (C7) Strength 5   Thumb Abd (C8) Strength 5   Upper Trapezius Flexibility Mod tightness bilat   Levator Scapula Flexibility mod tightness bilat   Pectoralis Minor Flexibility mod tightness   Segmental Mobility-Cervical Restricted t/o C 5-6-7   Dermatome/Sensory Testing intact t/o BUEs   Planned Therapy Interventions   Planned Therapy Interventions joint mobilization;manual therapy;ROM;strengthening;stretching   Planned Therapy Interventions Comment HEP, education   Planned Modality Interventions   Planned Modality Interventions Traction   Planned Modality Interventions Comments prn   Clinical Impression   Criteria for Skilled Therapeutic Interventions Met yes, treatment indicated   PT Diagnosis Cervical strain  with /segmental restriction   Influenced by the following impairments pain, decrd ROM, tightness, guarding   Functional limitations due to impairments work, sleep, garden, drive   Clinical Presentation Evolving/Changing   Clinical Decision Making (Complexity) Low complexity   Therapy Frequency 2 times/Week   Predicted Duration of Therapy Intervention (days/wks) up to 8 weeks   Risk & Benefits of therapy have been explained Yes   Patient, Family & other staff in agreement with plan of care Yes   Clinical Impression Comments Pt will benefit from PT with guided HEP focusing on improving cervical segmental motions, decreasing pain and mm guarding and restoring nromal posture.    Education Assessment   Barriers to Learning No barriers   ORTHO GOALS   PT Ortho Eval Goals 1;2;3   Ortho Goal 1   Goal Identifier STG 1   Goal Description Indep HEP compliance/demon including postural correction   Target Date 06/13/18   Ortho Goal 2   Goal Identifier STG 2   Goal Description Pt will have pain free full CROM to enable safe driving, yardwork   Target Date 06/20/18   Ortho Goal 3   Goal Identifier LTG   Goal Description ADLS and computer work not limited by pain or Cervical restriction   Target Date 07/18/18   Total Evaluation Time   Total Evaluation Time 30

## 2018-05-31 ENCOUNTER — HOSPITAL ENCOUNTER (OUTPATIENT)
Dept: PHYSICAL THERAPY | Facility: HOSPITAL | Age: 63
Setting detail: THERAPIES SERIES
End: 2018-05-31
Attending: FAMILY MEDICINE
Payer: COMMERCIAL

## 2018-05-31 PROCEDURE — 40000718 ZZHC STATISTIC PT DEPARTMENT ORTHO VISIT

## 2018-05-31 PROCEDURE — 97110 THERAPEUTIC EXERCISES: CPT | Mod: GP

## 2018-06-06 ENCOUNTER — HOSPITAL ENCOUNTER (OUTPATIENT)
Dept: PHYSICAL THERAPY | Facility: HOSPITAL | Age: 63
Setting detail: THERAPIES SERIES
End: 2018-06-06
Attending: FAMILY MEDICINE
Payer: COMMERCIAL

## 2018-06-06 PROCEDURE — 40000718 ZZHC STATISTIC PT DEPARTMENT ORTHO VISIT

## 2018-06-06 PROCEDURE — 97012 MECHANICAL TRACTION THERAPY: CPT | Mod: GP

## 2018-06-11 ENCOUNTER — HOSPITAL ENCOUNTER (OUTPATIENT)
Dept: PHYSICAL THERAPY | Facility: HOSPITAL | Age: 63
Setting detail: THERAPIES SERIES
End: 2018-06-11
Attending: FAMILY MEDICINE
Payer: COMMERCIAL

## 2018-06-11 DIAGNOSIS — I10 ESSENTIAL HYPERTENSION: ICD-10-CM

## 2018-06-11 DIAGNOSIS — I10 HTN (HYPERTENSION): ICD-10-CM

## 2018-06-11 PROCEDURE — 97012 MECHANICAL TRACTION THERAPY: CPT | Mod: GP

## 2018-06-11 PROCEDURE — 40000718 ZZHC STATISTIC PT DEPARTMENT ORTHO VISIT

## 2018-06-11 RX ORDER — METOPROLOL SUCCINATE 25 MG/1
TABLET, EXTENDED RELEASE ORAL
Qty: 180 TABLET | Refills: 2 | Status: SHIPPED | OUTPATIENT
Start: 2018-06-11 | End: 2019-03-11

## 2018-06-12 NOTE — TELEPHONE ENCOUNTER
Hydrochlorothiazide  Last Written Prescription Date: 8/31/17  Last Fill Quantity: 90 # of Refills: 2  Last Office Visit: 5/15/18    Adriana Mckeon LPN

## 2018-06-13 RX ORDER — HYDROCHLOROTHIAZIDE 12.5 MG/1
TABLET ORAL
Qty: 90 TABLET | Refills: 0 | Status: SHIPPED | OUTPATIENT
Start: 2018-06-13 | End: 2018-12-17

## 2018-06-13 NOTE — TELEPHONE ENCOUNTER
Normal serum creatinine on file in past 12 months           Recent Labs   Lab Test  05/12/15   0926   CR  0.52                   Normal serum potassium on file in past 12 months           Recent Labs   Lab Test  05/12/15   0926   POTASSIUM  4.3                            Normal serum sodium on file in past 12 months           Recent Labs   Lab Test  05/12/15   0926   NA  136            Please see note below for Hydrochlorothiazide.  Thank you.

## 2018-06-14 ENCOUNTER — HOSPITAL ENCOUNTER (OUTPATIENT)
Dept: PHYSICAL THERAPY | Facility: HOSPITAL | Age: 63
Setting detail: THERAPIES SERIES
End: 2018-06-14
Attending: FAMILY MEDICINE
Payer: COMMERCIAL

## 2018-06-14 PROCEDURE — 40000718 ZZHC STATISTIC PT DEPARTMENT ORTHO VISIT

## 2018-06-14 PROCEDURE — 97012 MECHANICAL TRACTION THERAPY: CPT | Mod: GP

## 2018-06-20 ENCOUNTER — ALLIED HEALTH/NURSE VISIT (OUTPATIENT)
Dept: ALLERGY | Facility: OTHER | Age: 63
End: 2018-06-20
Attending: FAMILY MEDICINE
Payer: COMMERCIAL

## 2018-06-20 DIAGNOSIS — Z23 NEED FOR PROPHYLACTIC VACCINATION AND INOCULATION AGAINST COMBINATIONS OF DISEASE: Primary | ICD-10-CM

## 2018-06-20 PROCEDURE — 90746 HEPB VACCINE 3 DOSE ADULT IM: CPT

## 2018-06-20 PROCEDURE — 90471 IMMUNIZATION ADMIN: CPT

## 2018-07-02 DIAGNOSIS — R52 PAIN: ICD-10-CM

## 2018-07-03 RX ORDER — IBUPROFEN 600 MG/1
TABLET, FILM COATED ORAL
Qty: 60 TABLET | Refills: 1 | Status: SHIPPED | OUTPATIENT
Start: 2018-07-03 | End: 2019-08-14

## 2018-07-09 ENCOUNTER — HOSPITAL ENCOUNTER (OUTPATIENT)
Dept: PHYSICAL THERAPY | Facility: HOSPITAL | Age: 63
Setting detail: THERAPIES SERIES
End: 2018-07-09
Attending: FAMILY MEDICINE
Payer: COMMERCIAL

## 2018-07-09 PROCEDURE — 40000718 ZZHC STATISTIC PT DEPARTMENT ORTHO VISIT

## 2018-07-09 PROCEDURE — 97012 MECHANICAL TRACTION THERAPY: CPT | Mod: GP

## 2018-07-09 PROCEDURE — 97110 THERAPEUTIC EXERCISES: CPT | Mod: GP

## 2018-07-25 ENCOUNTER — HOSPITAL ENCOUNTER (OUTPATIENT)
Dept: PHYSICAL THERAPY | Facility: HOSPITAL | Age: 63
Setting detail: THERAPIES SERIES
End: 2018-07-25
Attending: FAMILY MEDICINE
Payer: COMMERCIAL

## 2018-07-25 PROCEDURE — 40000718 ZZHC STATISTIC PT DEPARTMENT ORTHO VISIT

## 2018-07-25 PROCEDURE — 97110 THERAPEUTIC EXERCISES: CPT | Mod: GP

## 2018-07-25 PROCEDURE — 97012 MECHANICAL TRACTION THERAPY: CPT | Mod: GP

## 2018-07-27 ENCOUNTER — HOSPITAL ENCOUNTER (OUTPATIENT)
Dept: PHYSICAL THERAPY | Facility: HOSPITAL | Age: 63
Setting detail: THERAPIES SERIES
End: 2018-07-27
Attending: FAMILY MEDICINE
Payer: COMMERCIAL

## 2018-07-27 PROCEDURE — 97110 THERAPEUTIC EXERCISES: CPT | Mod: GP

## 2018-07-27 PROCEDURE — 97012 MECHANICAL TRACTION THERAPY: CPT | Mod: GP

## 2018-07-27 PROCEDURE — 40000718 ZZHC STATISTIC PT DEPARTMENT ORTHO VISIT

## 2018-08-01 DIAGNOSIS — R11.0 NAUSEA: ICD-10-CM

## 2018-08-01 DIAGNOSIS — B37.31 YEAST INFECTION OF THE VAGINA: ICD-10-CM

## 2018-08-01 NOTE — TELEPHONE ENCOUNTER
fluconazole (DIFLUCAN) 150 MG tablet    NOT ON CURRENT MED LIST     Last office visit 5/15/18    ondansetron (ZOFRAN) 4 MG tablet    Last office visit 5/15/18    Last refill date 6/15/17

## 2018-08-02 RX ORDER — ONDANSETRON 4 MG/1
TABLET, FILM COATED ORAL
Qty: 30 TABLET | Refills: 0 | Status: SHIPPED | OUTPATIENT
Start: 2018-08-02 | End: 2019-02-27

## 2018-08-02 RX ORDER — FLUCONAZOLE 150 MG/1
TABLET ORAL
Qty: 1 TABLET | Refills: 0 | Status: SHIPPED | OUTPATIENT
Start: 2018-08-02 | End: 2019-02-27

## 2018-08-02 NOTE — TELEPHONE ENCOUNTER
Diflucan 150 mg tab     Last Written Prescription Date:  3/19/18  Last Fill Quantity: 1,   # refills: 0  Last Office Visit: 5/15/18  Future Office visit:       Routing refill request to provider for review/approval because:

## 2018-11-05 ENCOUNTER — TELEPHONE (OUTPATIENT)
Dept: FAMILY MEDICINE | Facility: OTHER | Age: 63
End: 2018-11-05

## 2018-11-05 DIAGNOSIS — Z12.83 SCREENING EXAM FOR SKIN CANCER: Primary | ICD-10-CM

## 2018-11-15 ENCOUNTER — TRANSFERRED RECORDS (OUTPATIENT)
Dept: HEALTH INFORMATION MANAGEMENT | Facility: CLINIC | Age: 63
End: 2018-11-15

## 2018-12-17 DIAGNOSIS — I10 ESSENTIAL HYPERTENSION: ICD-10-CM

## 2018-12-19 RX ORDER — HYDROCHLOROTHIAZIDE 12.5 MG/1
TABLET ORAL
Qty: 90 TABLET | Refills: 0 | Status: SHIPPED | OUTPATIENT
Start: 2018-12-19 | End: 2019-03-11

## 2019-02-27 ENCOUNTER — OFFICE VISIT (OUTPATIENT)
Dept: FAMILY MEDICINE | Facility: OTHER | Age: 64
End: 2019-02-27
Attending: FAMILY MEDICINE
Payer: COMMERCIAL

## 2019-02-27 VITALS
TEMPERATURE: 100.8 F | OXYGEN SATURATION: 97 % | HEART RATE: 101 BPM | SYSTOLIC BLOOD PRESSURE: 148 MMHG | BODY MASS INDEX: 20 KG/M2 | HEIGHT: 68 IN | WEIGHT: 132 LBS | DIASTOLIC BLOOD PRESSURE: 72 MMHG

## 2019-02-27 DIAGNOSIS — J11.1 INFLUENZA-LIKE ILLNESS: Primary | ICD-10-CM

## 2019-02-27 PROCEDURE — 99213 OFFICE O/P EST LOW 20 MIN: CPT | Performed by: FAMILY MEDICINE

## 2019-02-27 ASSESSMENT — MIFFLIN-ST. JEOR: SCORE: 1212.25

## 2019-02-27 ASSESSMENT — PAIN SCALES - GENERAL: PAINLEVEL: NO PAIN (0)

## 2019-02-27 NOTE — PROGRESS NOTES
"  SUBJECTIVE:   Estela Delacruz is a 63 year old female who presents to clinic today for the following health issues:      RESPIRATORY SYMPTOMS      Duration: Monday    Description  cough, fever, chills, headache, fatigue/malaise and myalgias    Severity: moderate    Accompanying signs and symptoms: None    History (predisposing factors):  none    Precipitating or alleviating factors: None    Therapies tried and outcome:  Acetaminophen    Did get flu shot in october    Dry  Cough    Chest burns    Fever comes down with Tylenol.     No ST    HA and lots of myalgias           Problem list and histories reviewed & adjusted, as indicated.  Additional history: as documented    Labs reviewed in EPIC    Reviewed and updated as needed this visit by clinical staff  Tobacco  Allergies  Meds  Med Hx  Surg Hx  Fam Hx  Soc Hx      Reviewed and updated as needed this visit by Provider         ROS:  CONSTITUTIONAL: NEGATIVE for fever, chills, change in weight  ENT/MOUTH: NEGATIVE for ear, mouth and throat problems  RESP: NEGATIVE for significant cough or SOB  CV: NEGATIVE for chest pain, palpitations or peripheral edema    OBJECTIVE:                                                    /72   Pulse 101   Temp 100.8  F (38.2  C)   Ht 1.727 m (5' 8\")   Wt 59.9 kg (132 lb)   SpO2 97%   BMI 20.07 kg/m    Body mass index is 20.07 kg/m .   GENERAL: healthy, alert, well nourished, well hydrated, no distress  HENT: ear canals- normal; TMs- normal; Nose- normal; Mouth- no ulcers, no lesions  NECK: no tenderness, no adenopathy, no asymmetry, no masses, no stiffness; thyroid- normal to palpation  RESP: lungs clear to auscultation - no rales, no rhonchi, no wheezes  CV: regular rates and rhythm, normal S1 S2, no S3 or S4 and no murmur, no click or rub -  ABDOMEN: soft, no tenderness, no  hepatosplenomegaly, no masses, normal bowel sounds         ASSESSMENT/PLAN:                                                    (R67) " Influenza-like illness  (primary encounter diagnosis)  Comment: sure sounds like the flu even with having flu shot. Normal exam   Plan: discussed. Symptomatic treatment was discussed along when patient should call and/or come back into the clinic or go to ER/Urgent care. All questions answered.   Symptomatic treatment was discussed along what is available for OTC medications for symptomatic relief.               Farhat Pruitt MD  [facility]

## 2019-02-27 NOTE — NURSING NOTE
"Chief Complaint   Patient presents with     URI       Initial /72   Pulse 101   Temp 100.8  F (38.2  C)   Ht 1.727 m (5' 8\")   Wt 59.9 kg (132 lb)   SpO2 97%   BMI 20.07 kg/m   Estimated body mass index is 20.07 kg/m  as calculated from the following:    Height as of this encounter: 1.727 m (5' 8\").    Weight as of this encounter: 59.9 kg (132 lb).  Medication Reconciliation: complete    Alfred Watts LPN  "

## 2019-03-01 ENCOUNTER — TELEPHONE (OUTPATIENT)
Dept: FAMILY MEDICINE | Facility: OTHER | Age: 64
End: 2019-03-01

## 2019-03-01 NOTE — TELEPHONE ENCOUNTER
"Called pt back to assess further. Pt's voice is audibly hoarse. Pt c/o fever and states that illness seems to have \"settled in my bronchioles.\" No appts available today. Pt advised to be seen in UC if she is feeling worse.   "

## 2019-03-01 NOTE — TELEPHONE ENCOUNTER
10:39 AM    Reason for Call: Phone Call    Description: Patient called and left a message on triage line stating that she is not feeling any better patient states she was in with Dr. Pruitt on 2/27/19 for URI and the symptoms have not improved and slightly gotten worse. Patient would like nurse to give her a call back,     Was an appointment offered for this call? No  If yes : Appointment type              Date    Preferred method for responding to this message: Telephone Call  What is your phone number ?[phone]    If we cannot reach you directly, may we leave a detailed response at the number you provided? Yes    Can this message wait until your PCP/provider returns, if available today? Not applicable    Елена Becerra MA

## 2019-03-03 ENCOUNTER — HOSPITAL ENCOUNTER (EMERGENCY)
Facility: HOSPITAL | Age: 64
Discharge: HOME | End: 2019-03-03
Attending: NURSE PRACTITIONER | Admitting: NURSE PRACTITIONER
Payer: COMMERCIAL

## 2019-03-03 ENCOUNTER — APPOINTMENT (OUTPATIENT)
Dept: GENERAL RADIOLOGY | Facility: HOSPITAL | Age: 64
End: 2019-03-03
Attending: NURSE PRACTITIONER
Payer: COMMERCIAL

## 2019-03-03 VITALS
WEIGHT: 125 LBS | BODY MASS INDEX: 19.01 KG/M2 | DIASTOLIC BLOOD PRESSURE: 92 MMHG | TEMPERATURE: 101.3 F | OXYGEN SATURATION: 98 % | SYSTOLIC BLOOD PRESSURE: 150 MMHG | RESPIRATION RATE: 18 BRPM

## 2019-03-03 DIAGNOSIS — J18.9 PNEUMONIA OF BOTH LOWER LOBES DUE TO INFECTIOUS ORGANISM: ICD-10-CM

## 2019-03-03 LAB
BASOPHILS # BLD AUTO: 0 10E9/L (ref 0–0.2)
BASOPHILS NFR BLD AUTO: 0.7 %
DIFFERENTIAL METHOD BLD: ABNORMAL
EOSINOPHIL # BLD AUTO: 0 10E9/L (ref 0–0.7)
EOSINOPHIL NFR BLD AUTO: 0.4 %
ERYTHROCYTE [DISTWIDTH] IN BLOOD BY AUTOMATED COUNT: 12.4 % (ref 10–15)
FLUAV+FLUBV RNA SPEC QL NAA+PROBE: NEGATIVE
FLUAV+FLUBV RNA SPEC QL NAA+PROBE: NEGATIVE
HCT VFR BLD AUTO: 40.1 % (ref 35–47)
HGB BLD-MCNC: 13.8 G/DL (ref 11.7–15.7)
IMM GRANULOCYTES # BLD: 0 10E9/L (ref 0–0.4)
IMM GRANULOCYTES NFR BLD: 0.2 %
LACTATE BLD-SCNC: 1.1 MMOL/L (ref 0.7–2)
LYMPHOCYTES # BLD AUTO: 1.3 10E9/L (ref 0.8–5.3)
LYMPHOCYTES NFR BLD AUTO: 23.9 %
MCH RBC QN AUTO: 34.2 PG (ref 26.5–33)
MCHC RBC AUTO-ENTMCNC: 34.4 G/DL (ref 31.5–36.5)
MCV RBC AUTO: 100 FL (ref 78–100)
MONOCYTES # BLD AUTO: 0.9 10E9/L (ref 0–1.3)
MONOCYTES NFR BLD AUTO: 16.1 %
NEUTROPHILS # BLD AUTO: 3.2 10E9/L (ref 1.6–8.3)
NEUTROPHILS NFR BLD AUTO: 58.7 %
NRBC # BLD AUTO: 0 10*3/UL
NRBC BLD AUTO-RTO: 0 /100
PLATELET # BLD AUTO: 199 10E9/L (ref 150–450)
RBC # BLD AUTO: 4.03 10E12/L (ref 3.8–5.2)
RSV RNA SPEC NAA+PROBE: NEGATIVE
SPECIMEN SOURCE: NORMAL
WBC # BLD AUTO: 5.4 10E9/L (ref 4–11)

## 2019-03-03 PROCEDURE — 25000128 H RX IP 250 OP 636: Performed by: NURSE PRACTITIONER

## 2019-03-03 PROCEDURE — 25000125 ZZHC RX 250

## 2019-03-03 PROCEDURE — 87040 BLOOD CULTURE FOR BACTERIA: CPT | Performed by: NURSE PRACTITIONER

## 2019-03-03 PROCEDURE — 96372 THER/PROPH/DIAG INJ SC/IM: CPT

## 2019-03-03 PROCEDURE — 99214 OFFICE O/P EST MOD 30 MIN: CPT | Performed by: NURSE PRACTITIONER

## 2019-03-03 PROCEDURE — 40000275 ZZH STATISTIC RCP TIME EA 10 MIN

## 2019-03-03 PROCEDURE — 85025 COMPLETE CBC W/AUTO DIFF WBC: CPT | Performed by: NURSE PRACTITIONER

## 2019-03-03 PROCEDURE — 71046 X-RAY EXAM CHEST 2 VIEWS: CPT | Mod: TC

## 2019-03-03 PROCEDURE — 83605 ASSAY OF LACTIC ACID: CPT | Performed by: NURSE PRACTITIONER

## 2019-03-03 PROCEDURE — 25000125 ZZHC RX 250: Performed by: NURSE PRACTITIONER

## 2019-03-03 PROCEDURE — G0463 HOSPITAL OUTPT CLINIC VISIT: HCPCS | Mod: 25

## 2019-03-03 PROCEDURE — 36415 COLL VENOUS BLD VENIPUNCTURE: CPT | Performed by: NURSE PRACTITIONER

## 2019-03-03 PROCEDURE — 87631 RESP VIRUS 3-5 TARGETS: CPT | Performed by: NURSE PRACTITIONER

## 2019-03-03 PROCEDURE — 94640 AIRWAY INHALATION TREATMENT: CPT

## 2019-03-03 RX ORDER — IPRATROPIUM BROMIDE AND ALBUTEROL SULFATE 2.5; .5 MG/3ML; MG/3ML
3 SOLUTION RESPIRATORY (INHALATION) ONCE
Status: COMPLETED | OUTPATIENT
Start: 2019-03-03 | End: 2019-03-03

## 2019-03-03 RX ORDER — AZITHROMYCIN 250 MG/1
TABLET, FILM COATED ORAL
Qty: 6 TABLET | Refills: 0 | Status: SHIPPED | OUTPATIENT
Start: 2019-03-03 | End: 2019-03-11

## 2019-03-03 RX ORDER — CODEINE PHOSPHATE AND GUAIFENESIN 10; 100 MG/5ML; MG/5ML
1-2 SOLUTION ORAL EVERY 4 HOURS PRN
Qty: 180 ML | Refills: 0 | Status: SHIPPED | OUTPATIENT
Start: 2019-03-03 | End: 2019-12-02

## 2019-03-03 RX ORDER — ALBUTEROL SULFATE 90 UG/1
2 AEROSOL, METERED RESPIRATORY (INHALATION) EVERY 6 HOURS PRN
Qty: 1 INHALER | Refills: 0 | Status: SHIPPED | OUTPATIENT
Start: 2019-03-03 | End: 2022-08-01

## 2019-03-03 RX ORDER — CEFTRIAXONE SODIUM 1 G
1 VIAL (EA) INJECTION ONCE
Status: COMPLETED | OUTPATIENT
Start: 2019-03-03 | End: 2019-03-03

## 2019-03-03 RX ORDER — LIDOCAINE HYDROCHLORIDE 10 MG/ML
INJECTION, SOLUTION EPIDURAL; INFILTRATION; INTRACAUDAL; PERINEURAL
Status: COMPLETED
Start: 2019-03-03 | End: 2019-03-03

## 2019-03-03 RX ADMIN — CEFTRIAXONE 1 G: 1 INJECTION, POWDER, FOR SOLUTION INTRAMUSCULAR; INTRAVENOUS at 13:35

## 2019-03-03 RX ADMIN — LIDOCAINE HYDROCHLORIDE 20 MG: 10 INJECTION, SOLUTION EPIDURAL; INFILTRATION; INTRACAUDAL; PERINEURAL at 13:35

## 2019-03-03 RX ADMIN — IPRATROPIUM BROMIDE AND ALBUTEROL SULFATE 3 ML: .5; 3 SOLUTION RESPIRATORY (INHALATION) at 13:32

## 2019-03-03 ASSESSMENT — ENCOUNTER SYMPTOMS
PSYCHIATRIC NEGATIVE: 1
VOMITING: 0
TROUBLE SWALLOWING: 0
ABDOMINAL PAIN: 0
ACTIVITY CHANGE: 0
NUMBNESS: 0
NECK PAIN: 0
WEAKNESS: 0
DYSURIA: 0
COUGH: 1
WHEEZING: 0
STRIDOR: 0
NECK STIFFNESS: 0
DIARRHEA: 0
SHORTNESS OF BREATH: 0
FEVER: 1

## 2019-03-03 NOTE — DISCHARGE INSTRUCTIONS
Take antibiotics as ordered.   Eat a yogurt daily while taking antibiotics.   Take Robitussin as needed as directed. Do not drive or participate in activities that require alertness while taking.   Use Albuterol inhaler as needed as directed.   Rest.   Increase water intake.   Work note.   Follow up with PCP in 2 days for re-check.   Return to urgent care or emergency department with any increase in symptoms or concerns.

## 2019-03-03 NOTE — ED TRIAGE NOTES
Pt presents today with c/o fever since Monday. Saw PCP on Wednesday. Now has chest congestion and nasal congestion. Treating fever with IBu and tylenol. Last had IBu at 0300 today.

## 2019-03-03 NOTE — LETTER
[facility]  [address]  [place] [address]  Phone: [phone]    March 3, 2019        Estela Delacruz  [address]  [place] [address]          To whom it may concern:    RE: Estela Delacruz    Patient was seen and treated today at our clinic.    Please excuse from work on 3-4-19 & 3-5-19.      Sincerely,        EDWARD Mitchell  3/3/2019  1:48 PM  [facility]

## 2019-03-03 NOTE — ED AVS SNAPSHOT
[facility]  [address] [address]  Phone:  [phone]                                    Estela Delacruz   MRN: [id number]    Department:  [facility]   Date of Visit:  3/3/2019           After Visit Summary Signature Page    I have received my discharge instructions, and my questions have been answered. I have discussed any challenges I see with this plan with the nurse or doctor.    ..........................................................................................................................................  Patient/Patient Representative Signature      ..........................................................................................................................................  Patient Representative Print Name and Relationship to Patient    ..................................................               ................................................  Date                                   Time    ..........................................................................................................................................  Reviewed by Signature/Title    ...................................................              ..............................................  Date                                               Time          22EPIC Rev 08/18

## 2019-03-03 NOTE — ED TRIAGE NOTES
Reports fever since Monday. Saw PCP Wednesday - instructed to rest and hydrate. Congestion an cough started Thursday. No OTC meds taken today.

## 2019-03-03 NOTE — ED PROVIDER NOTES
History     Chief Complaint   Patient presents with     Fever     The history is provided by the patient. No  was used.     Estela Delacruz is a 63 year old female who presents with a fever, fatigue, and cough. She's taken tylenol with mild effectiveness. Positive for hot and cold flashes. Drinking ok. Decreased appetite. Bowel and bladder are working well. No antibiotic use in the past 30 days.     She was seen in the clinic 4 days ago. She has developed a fever and worsening of her cough since clinic visit.    Denies underlining lung disease. She is a non tobacco user.       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 Dr Frey       Essential hypertension 03/28/2006     Priority: Medium     Problem list name updated by automated process. Provider to review       Sinusitis, chronic 02/25/2000     Priority: Medium     Problem list name updated by automated process. Provider to review          Past Medical History:    Past Medical History:   Diagnosis Date     Asymptomatic varicose veins 02/04/2013     Basal cell carcinoma 04/26/2007     Esophageal reflux 05/26/2011     Hypertension 03/28/2006     Unspecified sinusitis (chronic) 02/25/2000       Past Surgical History:    Past Surgical History:   Procedure Laterality Date     Arthroscopy right meniscal tear  2006    Dr Orozco     Arthroscopy right shoulder  2010     COLONOSCOPY  2008    Family  history of colon cancer (mother). Negative. Repeat 2013     DILATION AND CURETTAGE       Removed mass left neck  2007    Dr Cervantes     Septoplasty and sinus surgery  2001     Norton teeth removed         Family History:    Family History   Problem Relation Age of Onset     Cancer Mother         colon     Cancer - colorectal Sister      Hypertension No family hx of      Cerebrovascular Disease No family hx of        Social History:  Marital Status:   [2]  Social History     Tobacco Use     Smoking status: Never Smoker     Smokeless tobacco: Never Used   Substance Use Topics     Alcohol use: Yes     Comment: occasionally     Drug use: No        Medications:      albuterol (PROAIR HFA/PROVENTIL HFA/VENTOLIN HFA) 108 (90 Base) MCG/ACT inhaler   ASPIR-LOW 81 MG EC tablet   azithromycin (ZITHROMAX Z-EPHRAIM) 250 MG tablet   clonazePAM (KLONOPIN) 0.5 MG tablet   desonide (DESOWEN) 0.05 % ointment   estrogens, conjugated, (PREMARIN) 0.625 MG tablet   guaiFENesin-codeine (ROBITUSSIN AC) 100-10 MG/5ML solution   hydrochlorothiazide (HYDRODIURIL) 12.5 MG tablet   medroxyPROGESTERone (PROVERA) 10 MG tablet   metoprolol succinate (TOPROL-XL) 25 MG 24 hr tablet   metroNIDAZOLE (METROGEL) 0.75 % gel   mometasone (NASONEX) 50 MCG/ACT spray   nabumetone (RELAFEN) 500 MG tablet   NEXIUM 40 MG CR capsule   omeprazole (PRILOSEC) 40 MG capsule   tretinoin (RETIN-A) 0.025 % cream   ibuprofen (ADVIL/MOTRIN) 600 MG tablet         Review of Systems   Constitutional: Positive for appetite change, chills, fatigue and fever. Negative for activity change.        Hot and cold flashes.    HENT: Positive for congestion. Negative for ear discharge, ear pain, postnasal drip, rhinorrhea, sinus pressure, sinus pain, sore throat and trouble swallowing.    Respiratory: Positive for cough. Negative for shortness of breath, wheezing and stridor.    Gastrointestinal: Negative for abdominal pain, diarrhea and vomiting.   Genitourinary: Negative for  dysuria.   Musculoskeletal: Negative for back pain, neck pain and neck stiffness.   Skin: Negative for rash.   Neurological: Negative for weakness and numbness.   Psychiatric/Behavioral: Negative.        Physical Exam   BP: 150/92  Heart Rate: 102  Temp: 101.3  F (38.5  C)(no OTC meds today)  Resp: 18  Weight: 56.7 kg (125 lb)  SpO2: 98 %      Physical Exam   Constitutional: She is oriented to person, place, and time. She appears well-developed and well-nourished. She appears distressed.   HENT:   Head: Normocephalic.   Right Ear: External ear normal.   Left Ear: External ear normal.   Mouth/Throat: Oropharynx is clear and moist. No oropharyngeal exudate.   Neck: Normal range of motion. Neck supple.   Cardiovascular: Regular rhythm and normal heart sounds. Exam reveals no gallop and no friction rub.   No murmur heard.  Sinus tachycardia.    Pulmonary/Chest: Effort normal. No stridor. No respiratory distress. She has no wheezes. She has rales.   Harsh cough. Bibasilar crackles.    Abdominal: Soft. She exhibits no distension.   Musculoskeletal: Normal range of motion.   Lymphadenopathy:     She has no cervical adenopathy.   Neurological: She is alert and oriented to person, place, and time. She exhibits normal muscle tone.   Skin: Skin is warm and dry. Capillary refill takes less than 2 seconds. She is not diaphoretic. No erythema.   Psychiatric: She has a normal mood and affect. Her behavior is normal.   Nursing note and vitals reviewed.      ED Course     Procedures    I personally reviewed the x-rays and there are bilateral basilar infiltrates. Radiology review pending and nurse will notify patient if there is any change in the treatment plan.    Results for orders placed or performed during the hospital encounter of 03/03/19   Chest XR,  PA & LAT    Narrative    XR CHEST 2 VW    HISTORY: 63 years Female Cough.    COMPARISON: None    TECHNIQUE: 2 views of the chest were obtained.    FINDINGS: There is dense right  basilar airspace opacity. There is  airspace opacity of lesser density at the left lung base. The upper  lung fields are clear.        Impression    IMPRESSION: Bilateral basilar, right greater than left, airspace  opacities suggestive of pneumonia.    AMANDO GONGORA MD   CBC with platelets differential   Result Value Ref Range    WBC 5.4 4.0 - 11.0 10e9/L    RBC Count 4.03 3.8 - 5.2 10e12/L    Hemoglobin 13.8 11.7 - 15.7 g/dL    Hematocrit 40.1 35.0 - 47.0 %     78 - 100 fl    MCH 34.2 (H) 26.5 - 33.0 pg    MCHC 34.4 31.5 - 36.5 g/dL    RDW 12.4 10.0 - 15.0 %    Platelet Count 199 150 - 450 10e9/L    Diff Method Automated Method     % Neutrophils 58.7 %    % Lymphocytes 23.9 %    % Monocytes 16.1 %    % Eosinophils 0.4 %    % Basophils 0.7 %    % Immature Granulocytes 0.2 %    Nucleated RBCs 0 0 /100    Absolute Neutrophil 3.2 1.6 - 8.3 10e9/L    Absolute Lymphocytes 1.3 0.8 - 5.3 10e9/L    Absolute Monocytes 0.9 0.0 - 1.3 10e9/L    Absolute Eosinophils 0.0 0.0 - 0.7 10e9/L    Absolute Basophils 0.0 0.0 - 0.2 10e9/L    Abs Immature Granulocytes 0.0 0 - 0.4 10e9/L    Absolute Nucleated RBC 0.0    Lactic acid whole blood   Result Value Ref Range    Lactic Acid 1.1 0.7 - 2.0 mmol/L   Influenza A and B and RSV PCR   Result Value Ref Range    Specimen Description Nares     Influenza A PCR Negative NEG^Negative    Influenza B PCR Negative NEG^Negative    Resp Syncytial Virus Negative NEG^Negative     Medications   ipratropium - albuterol 0.5 mg/2.5 mg/3 mL (DUONEB) neb solution 3 mL (3 mLs Nebulization Given 3/3/19 1332)   cefTRIAXone (ROCEPHIN) injection 1 g (1 g Intramuscular Given 3/3/19 1335)   lidocaine (PF) (XYLOCAINE) 1 % injection (20 mg  Given 3/3/19 1335)     Monitored for 20 minutes and tolerated well.     Assessments & Plan (with Medical Decision Making)     She is febrile of fever of 101.3. She requires a work up.     She is on Metoprolol XR and has tachycardia present. She denies chest pain.      She was unable to tolerate Neb treatment as it was making her cough worse. She was instructed on Albuterol inhaler with a spacer by RT.     She has bilateral basilar pneumonia. Blood cultures obtained. She was treated with Rocephin IM here and a zpack.     She was strongly encouraged to recheck in clinic in 2 days to assure she is improving. Sooner with any increase in symptoms or concerns.     Work not given so she can rest.     With sepsis alarm trigger, I consulted with Dr. Ferrell who is working in the ER today. He agrees with plan of care.     Discussed plan of care. She verbalized understanding. All questions answered.     I have reviewed the nursing notes.    I have reviewed the findings, diagnosis, plan and need for follow up with the patient.  Discharged in stable condition.        Medication List      Started    albuterol 108 (90 Base) MCG/ACT inhaler  Commonly known as:  PROAIR HFA/PROVENTIL HFA/VENTOLIN HFA  2 puffs, Inhalation, EVERY 6 HOURS PRN     azithromycin 250 MG tablet  Commonly known as:  ZITHROMAX Z-EPHRAIM  Two tablets on the first day, then one tablet daily for the next 4 days     guaiFENesin-codeine 100-10 MG/5ML solution  Commonly known as:  ROBITUSSIN AC  1-2 tsp., Oral, EVERY 4 HOURS PRN            Final diagnoses:   Pneumonia of both lower lobes due to infectious organism (H)     Take antibiotics as ordered.   Eat a yogurt daily while taking antibiotics.   Take Robitussin as needed as directed. Do not drive or participate in activities that require alertness while taking.   Use Albuterol inhaler as needed as directed.   Rest.   Increase water intake.   Work note.   Follow up with PCP in 2 days for re-check.   Return to urgent care or emergency department with any increase in symptoms or concerns.     Veronica MACIAS, EDWARD  3/3/2019  12:13 PM  [facility]       Veronica Brewer NP  03/05/19 0849       Veronica Brewer NP  03/05/19 0851

## 2019-03-04 ENCOUNTER — TELEPHONE (OUTPATIENT)
Dept: FAMILY MEDICINE | Facility: OTHER | Age: 64
End: 2019-03-04

## 2019-03-04 NOTE — TELEPHONE ENCOUNTER
4:32 PM    Reason for Call: OVERBOOK    Patient is having the following symptoms: She was in Urgent care on Sunday (3-3-19) 1 for days. She was diagnosed w/bronchitus / pneumonia? She was told to followup w/her provider in 2 days    The patient is requesting an appointment for Tuesday (3-5-19) with Dr. STEVE Chaparro.    Was an appointment offered for this call? No  If yes : Appointment type              Date    Preferred method for responding to this message: Telephone Call  What is your phone number ?[phone]    If we cannot reach you directly, may we leave a detailed response at the number you provided? Yes    Can this message wait until your PCP/provider returns, if unavailable today? Not applicable, PCP is in today & Tues.    Helen Ferrell

## 2019-03-05 ASSESSMENT — ENCOUNTER SYMPTOMS
CHILLS: 1
BACK PAIN: 0
SINUS PAIN: 0
RHINORRHEA: 0
APPETITE CHANGE: 1
FATIGUE: 1
SORE THROAT: 0
SINUS PRESSURE: 0

## 2019-03-05 NOTE — TELEPHONE ENCOUNTER
Called patient she has been on antibiotics since Sunday, no fevers and is starting to feel better, still very hoarse. Made her an appt for next Monday for urgent care follow up. Advised if she does spike fevers or her cough worsens to let us know or follow up again at urgent care. Pt verbalized understanding.

## 2019-03-05 NOTE — PROGRESS NOTES
SUBJECTIVE:   Estela Delacruz is a 63 year old female who presents to clinic today for the following health issues:      ED/UC Followup:    Facility:  [facility] [place]  Date of visit: 3/3/19  Reason for visit: Pneumonia   Current Status: improving, completed Zithromax     This started with 5 days of fever to 101, no cough. On the 6th day she started coughing, was seen for the second time in Urgent Care and started on Zithromax. Her fever resolved the next day. She is feeling well, still coughing up mucous, no fever.     Blood pressure  She needs renewal of her BP medications. Dr Turner does her paps and mammograms    Neck pain  She had strained her neck last year and had good results with traction with Markus in PT. She bought her own machine and would like to see Markus again to get instruction on how to use this. She has intermittent recurrences of pain.         Problem list and histories reviewed & adjusted, as indicated.  Additional history: as documented    Patient Active Problem List   Diagnosis     Varicose veins of legs     Essential hypertension     Sinusitis, chronic     Esophageal reflux     Basal cell carcinoma     Advanced care planning/counseling discussion     Telangiectasia of limb     Actinic keratosis     Personal history of malignant neoplasm of skin     Past Surgical History:   Procedure Laterality Date     Arthroscopy right meniscal tear  2006    Dr Orozco     Arthroscopy right shoulder  2010     COLONOSCOPY  2008    Family history of colon cancer (mother). Negative. Repeat 2013     DILATION AND CURETTAGE       Removed mass left neck  2007    Dr Cervantes     Septoplasty and sinus surgery  2001     [place] teeth removed         Social History     Tobacco Use     Smoking status: Never Smoker     Smokeless tobacco: Never Used   Substance Use Topics     Alcohol use: Yes     Comment: occasionally     Family History   Problem Relation Age of Onset     Cancer Mother         colon     Cancer - colorectal Sister       Hypertension No family hx of      Cerebrovascular Disease No family hx of          Current Outpatient Medications   Medication Sig Dispense Refill     albuterol (PROAIR HFA/PROVENTIL HFA/VENTOLIN HFA) 108 (90 Base) MCG/ACT inhaler Inhale 2 puffs into the lungs every 6 hours as needed for shortness of breath / dyspnea or wheezing 1 Inhaler 0     ASPIR-LOW 81 MG EC tablet TAKE ONE TABLET BY MOUTH DAILY 90 tablet 2     clonazePAM (KLONOPIN) 0.5 MG tablet TAKE 1/2 TO 1 TABLET BY MOUTH TWICE DAILY AS NEEDED FOR ANXIETY 60 tablet 0     desonide (DESOWEN) 0.05 % ointment        estrogens, conjugated, (PREMARIN) 0.625 MG tablet Take  by mouth daily.       guaiFENesin-codeine (ROBITUSSIN AC) 100-10 MG/5ML solution Take 5-10 mLs by mouth every 4 hours as needed for cough 180 mL 0     hydrochlorothiazide (HYDRODIURIL) 12.5 MG tablet Take 1 tablet (12.5 mg) by mouth daily 90 tablet 3     ibuprofen (ADVIL/MOTRIN) 600 MG tablet TAKE 1 TABLET BY MOUTH EVERY 8 HOURS AS NEEDED FOR PAIN 60 tablet 1     medroxyPROGESTERone (PROVERA) 10 MG tablet Take 10 mg by mouth. takes every other month days 1-12       metoprolol succinate ER (TOPROL-XL) 50 MG 24 hr tablet Take 1 tablet (50 mg) by mouth daily 90 tablet 3     metroNIDAZOLE (METROGEL) 0.75 % gel Apply topically 2 times daily Apply h,s. And AM to central face - [id number] 45 g 3     mometasone (NASONEX) 50 MCG/ACT spray SPRAY 2 SPRAYS INTO EACH NOSTRIL DAILY AS NEEDED 17 g 11     nabumetone (RELAFEN) 500 MG tablet Take 1 tablet (500 mg) by mouth 2 times daily as needed for moderate pain 60 tablet 1     NEXIUM 40 MG CR capsule TAKE 1 CAPSULE BY MOUTH DAILY 90 capsule 0     omeprazole (PRILOSEC) 40 MG capsule Take 1 capsule (40 mg) by mouth daily 90 capsule 1     tretinoin (RETIN-A) 0.025 % cream APPLY TOPICALLY AT BEDTIME       No Known Allergies  BP Readings from Last 3 Encounters:   03/11/19 132/74   03/03/19 150/92   02/27/19 148/72    Wt Readings from Last 3 Encounters:   03/11/19  56.7 kg (125 lb)   03/03/19 56.7 kg (125 lb)   02/27/19 59.9 kg (132 lb)                    Reviewed and updated as needed this visit by clinical staff       Reviewed and updated as needed this visit by Provider         ROS:  Constitutional, HEENT, cardiovascular, pulmonary, gi and gu systems are negative, except as otherwise noted.    OBJECTIVE:                                                    /74   Pulse 61   Temp 97.4  F (36.3  C) (Tympanic)   Resp 20   Wt 56.7 kg (125 lb)   SpO2 98%   BMI 19.01 kg/m     Body mass index is 19.01 kg/m .  GENERAL APPEARANCE: healthy, alert and no distress  NECK: no adenopathy, no asymmetry, masses, or scars and thyroid normal to palpation  RESP: lungs clear to auscultation - no rales, rhonchi or wheezes  CV: regular rates and rhythm, normal S1 S2, no S3 or S4 and no murmur, click or rub  NEURO: Normal strength and tone, mentation intact and speech normal  PSYCH: mentation appears normal and affect normal/bright         ASSESSMENT/PLAN:                                                    1. Pneumonia of both lower lobes due to infectious organism (H)  Improving, zithromax is still on board. Follow up for recurrence of symptoms or concerns    2. Essential hypertension  Renewed. She will schedule a PE for labs and follow up in 2 months, she will not need a pap  - metoprolol succinate ER (TOPROL-XL) 50 MG 24 hr tablet; Take 1 tablet (50 mg) by mouth daily  Dispense: 90 tablet; Refill: 3  - hydrochlorothiazide (HYDRODIURIL) 12.5 MG tablet; Take 1 tablet (12.5 mg) by mouth daily  Dispense: 90 tablet; Refill: 3    3. Neck pain on left side  Referral done for instruction on using the traction machine she bought  - PHYSICAL THERAPY REFERRAL; Future      Follow up with Provider - 2 months     Kassandra Chaparro MD  [facility] - [place]

## 2019-03-09 LAB
BACTERIA SPEC CULT: NORMAL
BACTERIA SPEC CULT: NORMAL
SPECIMEN SOURCE: NORMAL
SPECIMEN SOURCE: NORMAL

## 2019-03-11 ENCOUNTER — OFFICE VISIT (OUTPATIENT)
Dept: FAMILY MEDICINE | Facility: OTHER | Age: 64
End: 2019-03-11
Attending: FAMILY MEDICINE
Payer: COMMERCIAL

## 2019-03-11 VITALS
SYSTOLIC BLOOD PRESSURE: 132 MMHG | DIASTOLIC BLOOD PRESSURE: 74 MMHG | BODY MASS INDEX: 19.01 KG/M2 | TEMPERATURE: 97.4 F | OXYGEN SATURATION: 98 % | HEART RATE: 61 BPM | RESPIRATION RATE: 20 BRPM | WEIGHT: 125 LBS

## 2019-03-11 DIAGNOSIS — J18.9 PNEUMONIA OF BOTH LOWER LOBES DUE TO INFECTIOUS ORGANISM: Primary | ICD-10-CM

## 2019-03-11 DIAGNOSIS — M54.2 NECK PAIN ON LEFT SIDE: ICD-10-CM

## 2019-03-11 DIAGNOSIS — I10 ESSENTIAL HYPERTENSION: ICD-10-CM

## 2019-03-11 PROCEDURE — 99213 OFFICE O/P EST LOW 20 MIN: CPT | Performed by: FAMILY MEDICINE

## 2019-03-11 RX ORDER — HYDROCHLOROTHIAZIDE 12.5 MG/1
12.5 TABLET ORAL DAILY
Qty: 90 TABLET | Refills: 3 | Status: SHIPPED | OUTPATIENT
Start: 2019-03-11 | End: 2020-03-22

## 2019-03-11 RX ORDER — METOPROLOL SUCCINATE 50 MG/1
50 TABLET, EXTENDED RELEASE ORAL DAILY
Qty: 90 TABLET | Refills: 3 | Status: SHIPPED | OUTPATIENT
Start: 2019-03-11 | End: 2020-03-22

## 2019-03-11 ASSESSMENT — PAIN SCALES - GENERAL: PAINLEVEL: NO PAIN (0)

## 2019-03-11 NOTE — NURSING NOTE
"Chief Complaint   Patient presents with     Urgent Care       Initial /74   Pulse 61   Temp 97.4  F (36.3  C) (Tympanic)   Resp 20   Wt 56.7 kg (125 lb)   SpO2 98%   BMI 19.01 kg/m   Estimated body mass index is 19.01 kg/m  as calculated from the following:    Height as of 2/27/19: 1.727 m (5' 8\").    Weight as of this encounter: 56.7 kg (125 lb).  Medication Reconciliation: complete    Ashlee Carvajal LPN    "

## 2019-04-04 ENCOUNTER — HOSPITAL ENCOUNTER (OUTPATIENT)
Dept: PHYSICAL THERAPY | Facility: HOSPITAL | Age: 64
Setting detail: THERAPIES SERIES
End: 2019-04-04
Attending: FAMILY MEDICINE
Payer: COMMERCIAL

## 2019-04-04 DIAGNOSIS — M54.2 NECK PAIN ON LEFT SIDE: ICD-10-CM

## 2019-04-04 PROCEDURE — 97110 THERAPEUTIC EXERCISES: CPT | Mod: GP

## 2019-04-04 PROCEDURE — 97161 PT EVAL LOW COMPLEX 20 MIN: CPT | Mod: GP

## 2019-04-04 NOTE — PROGRESS NOTES
04/04/19 1300   General Information   Type of Visit Initial OP Ortho PT Evaluation   Start of Care Date 04/04/19   Referring Physician Dr CARMEN Chaparro   Patient/Family Goals Statement learn traction to decr neck pain   Orders Evaluate and Treat   Orders Comment set up home txn unit   Date of Order 03/11/19   Insurance Type [employer]   Medical Diagnosis Neck pain with stiffness   Surgical/Medical history reviewed Yes   Precautions/Limitations no known precautions/limitations   General Information Comments Neck Disability Index - 18% score   Body Part(s)   Body Part(s) Cervical Spine   Presentation and Etiology   Pertinent history of current problem (include personal factors and/or comorbidities that impact the POC) This 64 y/o female referred to OP PT d/t chronic neck pain. Pt has been seen here before by this provider for acute strain d/t carrying heavy object approx 11 months ago. Pt has had incrd neck pain over past few months and has purchased the recommended cervical traction home unit to be instructed in. Pt is currently having difficulty when sleeping at neck becomes stiff and wakes her up from pain.    Impairments A. Pain;E. Decreased flexibility;F. Decreased strength and endurance   Functional Limitations perform activities of daily living;perform desired leisure / sports activities   Symptom Location neck, L>R   How/Where did it occur While carrying an object  (in 2018)   Chronicity Recurrent   Pain rating (0-10 point scale) Best (/10);Worst (/10)   Best (/10) 3   Worst (/10) 8   Pain quality A. Sharp;B. Dull;C. Aching;D. Burning   Frequency of pain/symptoms B. Intermittent   Pain/symptoms exacerbated by G. Certain positions   Pain/symptoms eased by E. Changing positions;G. Heat  (stretching)   Progression of symptoms since onset: Worsened   Current Level of Function   Patient role/employment history A. Employed   Employment Comments works at [employer] job   Living environment [facility]   Current  equipment-Gait/Locomotion None   Fall Risk Screen   Have you fallen 2 or more times in the past year? No   Have you fallen and had an injury in the past year? No   Is patient a fall risk? No   Cervical Spine   Observation pt is tall with long thin neck   Integumentary  intact   Posture rounded shlds, fwd neck, fwd head. Trap and SCMs prominent    Cervical Flexion ROM wfl   Cervical Right Side Bending ROM 25%   Cervical Left Side Bending ROM 50%   Cervical Right Rotation ROM wnl   Cervical Left Rotation ROM 25% of wnl   Shoulder AROM Screen wfl   Shoulder Shrug (C2-C4) Strength 5   Shoulder Abd (C5) Strength 4-   Elbow Flexion (C5, C6) Strength 5   Elbow Extension (C7) Strength 5   Upper Trapezius Flexibility Bilat tightness, Upper trap   Levator Scapula Flexibility tight R>L   Scalene Flexibility tight with overpressure   Cervical/Shoulder Special Tests Comments no numbness, tingling or pain UEs.    Segmental Mobility-Cervical decrd C5-6, C 6-7 to AP and rotation bilat   Palpation most signif pain/tenderness  along cervical paraspinals, traps. Incrd tissue tension t/o gus R   Dermatome/Sensory Testing nrmal   Planned Therapy Interventions   Planned Therapy Interventions joint mobilization;manual therapy;ROM;strengthening;neuromuscular re-education   Planned Therapy Interventions Comment HEP, educ   Planned Modality Interventions   Planned Modality Interventions Traction   Clinical Impression   Criteria for Skilled Therapeutic Interventions Met yes, treatment indicated   PT Diagnosis Chronic neck pain with segmental immobility   Influenced by the following impairments pain tightness, mm guarding, headaches   Functional limitations due to impairments driving, sleeping, working, reading   Clinical Presentation Evolving/Changing   Clinical Decision Making (Complexity) Low complexity   Therapy Frequency 1 time/week   Predicted Duration of Therapy Intervention (days/wks) 8 weeks   Risk & Benefits of therapy have been  explained Yes   Patient, Family & other staff in agreement with plan of care Yes   Clinical Impression Comments Pt will benefit from manual based PT with educ on posture, body mechanics and HEP to improve cervical segmental motion and reduce pain   Education Assessment   Barriers to Learning No barriers   ORTHO GOALS   PT Ortho Eval Goals 1;2;3   Ortho Goal 1   Goal Identifier STG 1   Goal Description Pt will demonstrate indep HEP compliance.    Target Date 04/25/19   Ortho Goal 2   Goal Identifier STG 2   Goal Description Improve CROM to WFL with pain under 4/10 with L Rotation to improve safe driving and consistent sleep   Target Date 05/02/19   Ortho Goal 3   Goal Identifier LTG    Goal Description ADLS, sleep and leisure not interrupted by neck pain or tightness   Target Date 04/04/19

## 2019-04-11 ENCOUNTER — HOSPITAL ENCOUNTER (OUTPATIENT)
Dept: PHYSICAL THERAPY | Facility: HOSPITAL | Age: 64
Setting detail: THERAPIES SERIES
End: 2019-04-11
Attending: FAMILY MEDICINE
Payer: COMMERCIAL

## 2019-04-11 PROCEDURE — 97140 MANUAL THERAPY 1/> REGIONS: CPT | Mod: GP,59

## 2019-04-11 PROCEDURE — 97012 MECHANICAL TRACTION THERAPY: CPT | Mod: GP

## 2019-04-16 ENCOUNTER — HOSPITAL ENCOUNTER (OUTPATIENT)
Dept: PHYSICAL THERAPY | Facility: HOSPITAL | Age: 64
Setting detail: THERAPIES SERIES
End: 2019-04-16
Attending: FAMILY MEDICINE
Payer: COMMERCIAL

## 2019-04-16 PROCEDURE — 97012 MECHANICAL TRACTION THERAPY: CPT | Mod: GP

## 2019-04-16 PROCEDURE — 97140 MANUAL THERAPY 1/> REGIONS: CPT | Mod: GP

## 2019-04-25 ENCOUNTER — HOSPITAL ENCOUNTER (OUTPATIENT)
Dept: PHYSICAL THERAPY | Facility: HOSPITAL | Age: 64
Setting detail: THERAPIES SERIES
End: 2019-04-25
Attending: FAMILY MEDICINE
Payer: COMMERCIAL

## 2019-04-25 PROCEDURE — 97140 MANUAL THERAPY 1/> REGIONS: CPT | Mod: GP,59

## 2019-04-25 PROCEDURE — 97012 MECHANICAL TRACTION THERAPY: CPT | Mod: GP

## 2019-05-02 NOTE — PROGRESS NOTES
Outpatient Physical Therapy Discharge Note     Patient: Estela Delacruz  : 1955    Beginning/End Dates of Reporting Period:  -18    Referring Provider: Dr CARMEN Chaparro    Therapy Diagnosis: cervical strain     Client Self Report: On last visit seen, pt reports no longer having headaches, happy with progress.  Objective Measurements: CROM; Rotation R 75%, Rotation L wnl. Lateral flexion bilat wnl.  Pain under 4/10 neck, head                                              Goals:  Goal Identifier STG 1   Goal Description Pt will demonstrate indep HEP compliance.    Target Date 18   Date Met      Progress: met as of 18     Goal Identifier STG 2   Goal Description Improve CROM to WFL with pain under 4/10 with L Rotation to improve safe driving and consistent sleep   Target Date 18   Date Met   18   Progress:     Goal Identifier LTG    Goal Description ADLs and computer work not limited by pain or cervical motion restrictions   Target Date 18   Date Met   18   Progress:     Goal Identifier     Goal Description     Target Date     Date Met      Progress:     Goal Identifier     Goal Description     Target Date     Date Met      Progress:     Goal Identifier     Goal Description     Target Date     Date Met      Progress:     Goal Identifier     Goal Description     Target Date     Date Met      Progress:     Goal Identifier     Goal Description     Target Date     Date Met      Progress:     Progress Toward Goals  Progress this reporting period: Pt was seen x 8 tx visits total for cervical traction, manual therapy, exercise and posture instruction. Pt achieved all goals as set see above.          Plan:  Discharge from therapy.    Discharge:    Reason for Discharge: Patient has met all goals.    Equipment Issued: encouraged pt to purchase traction unit supine Dorsey style.    Discharge Plan: Patient to continue home program.

## 2019-05-07 NOTE — PROGRESS NOTES
SUBJECTIVE:   CC: Estela Delacruz is an 64 year old woman who presents for preventive health visit.     Healthy Habits:    Do you get at least three servings of calcium containing foods daily (dairy, green leafy vegetables, etc.)? yes    Amount of exercise or daily activities, outside of work: [employer] classes 5 times a week    Problems taking medications regularly No    Medication side effects: No    Have you had an eye exam in the past two years? yes    Do you see a dentist twice per year? yes    Do you have sleep apnea, excessive snoring or daytime drowsiness?no    Right groin strain  Symptoms over the last month. Doing nikkie and exercise classes weekly. Relieved with rest          Today's PHQ-2 Score:   PHQ-2 ( 1999 [employer]) 2/27/2019 5/23/2013   Q1: Little interest or pleasure in doing things 0 0   Q2: Feeling down, depressed or hopeless 0 0   PHQ-2 Score 0 0       Abuse: Current or Past(Physical, Sexual or Emotional)- No  Do you feel safe in your environment? Yes    Social History     Tobacco Use     Smoking status: Never Smoker     Smokeless tobacco: Never Used   Substance Use Topics     Alcohol use: Yes     Comment: occasionally     If you drink alcohol do you typically have >3 drinks per day or >7 drinks per week? No                     Reviewed orders with patient.  Reviewed health maintenance and updated orders accordingly - Yes  BP Readings from Last 3 Encounters:   05/16/19 130/78   03/11/19 132/74   03/03/19 150/92    Wt Readings from Last 3 Encounters:   05/16/19 56.7 kg (125 lb)   03/11/19 56.7 kg (125 lb)   03/03/19 56.7 kg (125 lb)                  Patient Active Problem List   Diagnosis     Varicose veins of legs     Essential hypertension     Sinusitis, chronic     Esophageal reflux     Basal cell carcinoma     Advanced care planning/counseling discussion     Telangiectasia of limb     Actinic keratosis     Personal history of malignant neoplasm of skin     Past Surgical History:   Procedure  Laterality Date     Arthroscopy right meniscal tear  2006    Dr Orozco     Arthroscopy right shoulder  2010     COLONOSCOPY  2008    Family history of colon cancer (mother). Negative. Repeat 2013     DILATION AND CURETTAGE       Removed mass left neck  2007    Dr Cervantes     Septoplasty and sinus surgery  2001     [place] teeth removed         Social History     Tobacco Use     Smoking status: Never Smoker     Smokeless tobacco: Never Used   Substance Use Topics     Alcohol use: Yes     Comment: occasionally     Family History   Problem Relation Age of Onset     Cancer Mother         colon     Cancer - colorectal Sister      Hypertension No family hx of      Cerebrovascular Disease No family hx of          Current Outpatient Medications   Medication Sig Dispense Refill     albuterol (PROAIR HFA/PROVENTIL HFA/VENTOLIN HFA) 108 (90 Base) MCG/ACT inhaler Inhale 2 puffs into the lungs every 6 hours as needed for shortness of breath / dyspnea or wheezing 1 Inhaler 0     ASPIR-LOW 81 MG EC tablet TAKE ONE TABLET BY MOUTH DAILY 90 tablet 2     clonazePAM (KLONOPIN) 0.5 MG tablet TAKE 1/2 TO 1 TABLET BY MOUTH TWICE DAILY AS NEEDED FOR ANXIETY 60 tablet 0     desonide (DESOWEN) 0.05 % ointment        estrogens, conjugated, (PREMARIN) 0.625 MG tablet Take  by mouth daily.       guaiFENesin-codeine (ROBITUSSIN AC) 100-10 MG/5ML solution Take 5-10 mLs by mouth every 4 hours as needed for cough 180 mL 0     hydrochlorothiazide (HYDRODIURIL) 12.5 MG tablet Take 1 tablet (12.5 mg) by mouth daily 90 tablet 3     ibuprofen (ADVIL/MOTRIN) 600 MG tablet TAKE 1 TABLET BY MOUTH EVERY 8 HOURS AS NEEDED FOR PAIN 60 tablet 1     medroxyPROGESTERone (PROVERA) 10 MG tablet Take 10 mg by mouth. takes every other month days 1-12       metoprolol succinate ER (TOPROL-XL) 50 MG 24 hr tablet Take 1 tablet (50 mg) by mouth daily 90 tablet 3     metroNIDAZOLE (METROGEL) 0.75 % gel Apply topically 2 times daily Apply h,s. And AM to central face -  [id number] 45 g 3     mometasone (NASONEX) 50 MCG/ACT spray SPRAY 2 SPRAYS INTO EACH NOSTRIL DAILY AS NEEDED 17 g 11     nabumetone (RELAFEN) 500 MG tablet Take 1 tablet (500 mg) by mouth 2 times daily as needed for moderate pain 60 tablet 1     NEXIUM 40 MG CR capsule TAKE 1 CAPSULE BY MOUTH DAILY 90 capsule 0     omeprazole (PRILOSEC) 40 MG capsule Take 1 capsule (40 mg) by mouth daily 90 capsule 1     tretinoin (RETIN-A) 0.025 % cream APPLY TOPICALLY AT BEDTIME       No Known Allergies    Mammogram done by Dr Turner at [facility]    Pertinent mammograms are reviewed under the imaging tab.  History of abnormal Pap smear: NO - age 30-65 PAP every 5 years with negative HPV co-testing recommended     Reviewed and updated as needed this visit by clinical staff         Reviewed and updated as needed this visit by Provider        Past Medical History:   Diagnosis Date     Asymptomatic varicose veins 02/04/2013     Basal cell carcinoma 04/26/2007    [facility] chest wall. Dr Frey     Esophageal reflux 05/26/2011     Hypertension 03/28/2006     Unspecified sinusitis (chronic) 02/25/2000      Past Surgical History:   Procedure Laterality Date     Arthroscopy right meniscal tear  2006    Dr Orozco     Arthroscopy right shoulder  2010     COLONOSCOPY  2008    Family history of colon cancer (mother). Negative. Repeat 2013     DILATION AND CURETTAGE       Removed mass left neck  2007    Dr Cervantes     Septoplasty and sinus surgery  2001     [place] teeth removed       OB History   No data available       ROS:  CONSTITUTIONAL: NEGATIVE for fever, chills, change in weight  INTEGUMENTARY/SKIN: NEGATIVE for worrisome rashes, moles or lesions  EYES: NEGATIVE for vision changes or irritation  ENT: NEGATIVE for ear, mouth and throat problems  RESP: NEGATIVE for significant cough or SOB  BREAST: NEGATIVE for masses, tenderness or discharge  CV: NEGATIVE for chest pain, palpitations or peripheral edema  GI: NEGATIVE for nausea, abdominal  pain, heartburn, or change in bowel habits  : NEGATIVE for unusual urinary or vaginal symptoms. No vaginal bleeding.  MUSCULOSKELETAL: NEGATIVE for significant arthralgias or myalgia  NEURO: NEGATIVE for weakness, dizziness or paresthesias  ENDOCRINE: NEGATIVE for temperature intolerance, skin/hair changes  HEME/ALLERGY/IMMUNE: NEGATIVE for bleeding problems  PSYCHIATRIC: NEGATIVE for changes in mood or affect     OBJECTIVE:   There were no vitals taken for this visit.  EXAM:  GENERAL APPEARANCE: healthy, alert and no distress  EYES: Eyes grossly normal to inspection, PERRL and conjunctivae and sclerae normal  HENT: ear canals and TM's normal, nose and mouth without ulcers or lesions, oropharynx clear and oral mucous membranes moist  NECK: no adenopathy, no asymmetry, masses, or scars and thyroid normal to palpation  RESP: lungs clear to auscultation - no rales, rhonchi or wheezes  BREAST: normal without masses, tenderness or nipple discharge and no palpable axillary masses or adenopathy  CV: regular rate and rhythm, normal S1 S2, no S3 or S4, no murmur, click or rub, no peripheral edema and peripheral pulses strong  ABDOMEN: soft, nontender, no hepatosplenomegaly, no masses and bowel sounds normal  MS: no musculoskeletal defects are noted and gait is age appropriate without ataxia  SKIN: no suspicious lesions or rashes  NEURO: Normal strength and tone, sensory exam grossly normal, mentation intact and speech normal  PSYCH: mentation appears normal and affect normal/bright        ASSESSMENT/PLAN:   1. Encounter for preventive care  Mammogram and pap done by Dr Turner at [facility]    2. Essential hypertension  Good control. Check labs, recheck in 6 months  - CBC with platelets; Future  - Comprehensive metabolic panel; Future    3. Lipid screening  She will return for fasting labs  - Lipid Profile; Future    4. Encounter for screening examination for impaired glucose regulation and diabetes mellitus      5. Strain  "of groin, right, initial encounter  Discussed PT but she would prefer to continue with rest and heat. Call if she would like a referral to PT      COUNSELING:   Reviewed preventive health counseling, as reflected in patient instructions       Regular exercise       Healthy diet/nutrition    BP Readings from Last 1 Encounters:   03/11/19 132/74     Estimated body mass index is 19.01 kg/m  as calculated from the following:    Height as of 2/27/19: 1.727 m (5' 8\").    Weight as of 3/11/19: 56.7 kg (125 lb).           reports that she has never smoked. She has never used smokeless tobacco.      Counseling Resources:  ATP IV Guidelines  Pooled Cohorts Equation Calculator  Breast Cancer Risk Calculator  FRAX Risk Assessment  ICSI Preventive Guidelines  Dietary Guidelines for Americans, 2010  USDA's MyPlate  ASA Prophylaxis  Lung CA Screening    Kassandra Chaparro MD  [facility] - [place]  "

## 2019-05-16 ENCOUNTER — OFFICE VISIT (OUTPATIENT)
Dept: FAMILY MEDICINE | Facility: OTHER | Age: 64
End: 2019-05-16
Attending: FAMILY MEDICINE
Payer: COMMERCIAL

## 2019-05-16 ENCOUNTER — HOSPITAL ENCOUNTER (OUTPATIENT)
Dept: PHYSICAL THERAPY | Facility: HOSPITAL | Age: 64
Setting detail: THERAPIES SERIES
End: 2019-05-16
Attending: FAMILY MEDICINE
Payer: COMMERCIAL

## 2019-05-16 VITALS
BODY MASS INDEX: 18.94 KG/M2 | HEART RATE: 57 BPM | OXYGEN SATURATION: 100 % | RESPIRATION RATE: 19 BRPM | WEIGHT: 125 LBS | SYSTOLIC BLOOD PRESSURE: 130 MMHG | DIASTOLIC BLOOD PRESSURE: 78 MMHG | HEIGHT: 68 IN

## 2019-05-16 DIAGNOSIS — Z13.220 LIPID SCREENING: ICD-10-CM

## 2019-05-16 DIAGNOSIS — I10 ESSENTIAL HYPERTENSION: ICD-10-CM

## 2019-05-16 DIAGNOSIS — Z13.1 ENCOUNTER FOR SCREENING EXAMINATION FOR IMPAIRED GLUCOSE REGULATION AND DIABETES MELLITUS: ICD-10-CM

## 2019-05-16 DIAGNOSIS — S76.211A STRAIN OF GROIN, RIGHT, INITIAL ENCOUNTER: ICD-10-CM

## 2019-05-16 DIAGNOSIS — Z00.00 ENCOUNTER FOR PREVENTIVE CARE: Primary | ICD-10-CM

## 2019-05-16 PROCEDURE — 97140 MANUAL THERAPY 1/> REGIONS: CPT | Mod: GP

## 2019-05-16 PROCEDURE — 99396 PREV VISIT EST AGE 40-64: CPT | Performed by: FAMILY MEDICINE

## 2019-05-16 PROCEDURE — 97110 THERAPEUTIC EXERCISES: CPT | Mod: GP

## 2019-05-16 PROCEDURE — 97012 MECHANICAL TRACTION THERAPY: CPT | Mod: GP

## 2019-05-16 ASSESSMENT — MIFFLIN-ST. JEOR: SCORE: 1175.5

## 2019-05-16 ASSESSMENT — PAIN SCALES - GENERAL: PAINLEVEL: NO PAIN (0)

## 2019-05-16 NOTE — NURSING NOTE
"Chief Complaint   Patient presents with     Physical       Initial /78   Pulse 57   Resp 19   Ht 1.727 m (5' 8\")   Wt 56.7 kg (125 lb)   SpO2 100%   BMI 19.01 kg/m   Estimated body mass index is 19.01 kg/m  as calculated from the following:    Height as of this encounter: 1.727 m (5' 8\").    Weight as of this encounter: 56.7 kg (125 lb).  Medication Reconciliation: complete    Ashlee Carvajal LPN    "

## 2019-06-11 ENCOUNTER — HOSPITAL ENCOUNTER (OUTPATIENT)
Dept: PHYSICAL THERAPY | Facility: HOSPITAL | Age: 64
Setting detail: THERAPIES SERIES
End: 2019-06-11
Attending: FAMILY MEDICINE
Payer: COMMERCIAL

## 2019-06-11 PROCEDURE — 97012 MECHANICAL TRACTION THERAPY: CPT | Mod: GP

## 2019-06-11 PROCEDURE — 97140 MANUAL THERAPY 1/> REGIONS: CPT | Mod: GP,59

## 2019-06-28 ENCOUNTER — HOSPITAL ENCOUNTER (OUTPATIENT)
Dept: PHYSICAL THERAPY | Facility: HOSPITAL | Age: 64
Setting detail: THERAPIES SERIES
End: 2019-06-28
Attending: FAMILY MEDICINE
Payer: COMMERCIAL

## 2019-06-28 PROCEDURE — 97110 THERAPEUTIC EXERCISES: CPT | Mod: GP

## 2019-06-28 PROCEDURE — 97530 THERAPEUTIC ACTIVITIES: CPT | Mod: GP

## 2019-06-28 PROCEDURE — 97012 MECHANICAL TRACTION THERAPY: CPT | Mod: GP

## 2019-08-14 DIAGNOSIS — R52 PAIN: ICD-10-CM

## 2019-08-15 RX ORDER — IBUPROFEN 600 MG/1
TABLET, FILM COATED ORAL
Qty: 60 TABLET | Refills: 1 | Status: SHIPPED | OUTPATIENT
Start: 2019-08-15 | End: 2020-03-22

## 2019-10-01 ENCOUNTER — TELEPHONE (OUTPATIENT)
Dept: FAMILY MEDICINE | Facility: OTHER | Age: 64
End: 2019-10-01

## 2019-10-01 DIAGNOSIS — Z12.83 SCREENING EXAM FOR SKIN CANCER: Primary | ICD-10-CM

## 2019-10-01 NOTE — TELEPHONE ENCOUNTER
Needs an updated referral for Allison Lyle. Saw her today.  Has a history of seeing her every 6 months.    Please include today's date in the referral

## 2019-11-19 NOTE — PROGRESS NOTES
Subjective     Estela Delacruz is a 64 year old female who presents to clinic today for the following health issues:    HPI   Hypertension Follow-up  6 month follow up      Do you check your blood pressure regularly outside of the clinic? No     Are you following a low salt diet? Yes    Are your blood pressures ever more than 140 on the top number (systolic) OR more   than 90 on the bottom number (diastolic), for example 140/90? No      How many servings of fruits and vegetables do you eat daily?  0-1    On average, how many sweetened beverages do you drink each day (soda, juice, sweet tea, etc)?   0    How many days per week do you miss taking your medication? 0    Sinus congestion  Estela normally has severe sinus infections in the spring and fall, and feels one coming on now with sinus congestion in the maxillary sinuses,no cough, no fever. She has tried OTC medications without much relief    Patient Active Problem List   Diagnosis     Varicose veins of legs     Essential hypertension     Sinusitis, chronic     Esophageal reflux     Basal cell carcinoma     Advanced care planning/counseling discussion     Telangiectasia of limb     Actinic keratosis     Personal history of malignant neoplasm of skin     Seborrheic dermatitis     History of nonmelanoma skin cancer     Past Surgical History:   Procedure Laterality Date     Arthroscopy right meniscal tear  2006    Dr Orozco     Arthroscopy right shoulder  2010     COLONOSCOPY  2008    Family history of colon cancer (mother). Negative. Repeat 2013     DILATION AND CURETTAGE       Removed mass left neck  2007    Dr Cervantes     Septoplasty and sinus surgery  2001     [place] teeth removed         Social History     Tobacco Use     Smoking status: Never Smoker     Smokeless tobacco: Never Used   Substance Use Topics     Alcohol use: Yes     Comment: occasionally     Family History   Problem Relation Age of Onset     Cancer Mother         colon     Cancer - colorectal  Sister      Hypertension No family hx of      Cerebrovascular Disease No family hx of          Current Outpatient Medications   Medication Sig Dispense Refill     albuterol (PROAIR HFA/PROVENTIL HFA/VENTOLIN HFA) 108 (90 Base) MCG/ACT inhaler Inhale 2 puffs into the lungs every 6 hours as needed for shortness of breath / dyspnea or wheezing 1 Inhaler 0     ASPIR-LOW 81 MG EC tablet TAKE ONE TABLET BY MOUTH DAILY 90 tablet 2     cefPROZIL (CEFZIL) 500 MG tablet Take 1 tablet (500 mg) by mouth 2 times daily for 10 days 20 tablet 0     clonazePAM (KLONOPIN) 0.5 MG tablet TAKE 1/2 TO 1 TABLET BY MOUTH TWICE DAILY AS NEEDED FOR ANXIETY 60 tablet 0     estrogens, conjugated, (PREMARIN) 0.625 MG tablet Take  by mouth daily.       hydrochlorothiazide (HYDRODIURIL) 12.5 MG tablet Take 1 tablet (12.5 mg) by mouth daily 90 tablet 3     ibuprofen (ADVIL/MOTRIN) 600 MG tablet TAKE 1 TABLET BY MOUTH EVERY 8 HOURS AS NEEDED FOR PAIN 60 tablet 1     medroxyPROGESTERone (PROVERA) 10 MG tablet Take 10 mg by mouth. takes every other month days 1-12       metoprolol succinate ER (TOPROL-XL) 50 MG 24 hr tablet Take 1 tablet (50 mg) by mouth daily 90 tablet 3     metroNIDAZOLE (METROGEL) 0.75 % gel Apply topically 2 times daily Apply h,s. And AM to central face - [id number] 45 g 3     mometasone (NASONEX) 50 MCG/ACT spray SPRAY 2 SPRAYS INTO EACH NOSTRIL DAILY AS NEEDED 17 g 11     nabumetone (RELAFEN) 500 MG tablet Take 1 tablet (500 mg) by mouth 2 times daily as needed for moderate pain 60 tablet 1     tretinoin (RETIN-A) 0.025 % cream APPLY TOPICALLY AT BEDTIME       NEXIUM 40 MG CR capsule TAKE 1 CAPSULE BY MOUTH DAILY (Patient not taking: Reported on 12/2/2019) 90 capsule 0     No Known Allergies  BP Readings from Last 3 Encounters:   12/02/19 138/80   05/16/19 130/78   03/11/19 132/74    Wt Readings from Last 3 Encounters:   12/02/19 56.7 kg (125 lb)   05/16/19 56.7 kg (125 lb)   03/11/19 56.7 kg (125 lb)                 "      Reviewed and updated as needed this visit by Provider  Allergies  Meds         Review of Systems   ROS COMP: Constitutional, HEENT, cardiovascular, pulmonary, gi and gu systems are negative, except as otherwise noted.      Objective    /80   Pulse 91   Resp 19   Ht 1.727 m (5' 8\")   Wt 56.7 kg (125 lb)   SpO2 100%   BMI 19.01 kg/m    Body mass index is 19.01 kg/m .  Physical Exam   GENERAL: healthy, alert and no distress  EYES: Eyes grossly normal to inspection, PERRL and conjunctivae and sclerae normal  HENT: ear canals and TM's normal, nose and mouth without ulcers or lesions  NECK: no adenopathy, no asymmetry, masses, or scars and thyroid normal to palpation  RESP: lungs clear to auscultation - no rales, rhonchi or wheezes  CV: regular rate and rhythm, normal S1 S2, no S3 or S4, no murmur, click or rub, no peripheral edema and peripheral pulses strong  MS: no gross musculoskeletal defects noted, no edema  NEURO: Normal strength and tone, mentation intact and speech normal  PSYCH: mentation appears normal, affect normal/bright            Assessment & Plan     1. Essential hypertension  Good control. Continue current medications. Recheck in 6 months for PE. She notes that she is seeing Clive Velazquez currently    2. Acute non-recurrent maxillary sinusitis  Will treat bid for 10 days, follow up if not improving  - cefPROZIL (CEFZIL) 500 MG tablet; Take 1 tablet (500 mg) by mouth 2 times daily for 10 days  Dispense: 20 tablet; Refill: 0      3. Immunization due  Flu shot is given today        Return in about 6 months (around 6/2/2020) for Physical Exam, BP Recheck.    Kassandra Chaparro MD  [facility] - [place]      "

## 2019-12-02 ENCOUNTER — OFFICE VISIT (OUTPATIENT)
Dept: FAMILY MEDICINE | Facility: OTHER | Age: 64
End: 2019-12-02
Attending: FAMILY MEDICINE
Payer: COMMERCIAL

## 2019-12-02 VITALS
HEIGHT: 68 IN | RESPIRATION RATE: 19 BRPM | BODY MASS INDEX: 18.94 KG/M2 | HEART RATE: 91 BPM | WEIGHT: 125 LBS | SYSTOLIC BLOOD PRESSURE: 138 MMHG | OXYGEN SATURATION: 100 % | DIASTOLIC BLOOD PRESSURE: 80 MMHG

## 2019-12-02 DIAGNOSIS — I10 ESSENTIAL HYPERTENSION: Primary | ICD-10-CM

## 2019-12-02 DIAGNOSIS — J01.00 ACUTE NON-RECURRENT MAXILLARY SINUSITIS: ICD-10-CM

## 2019-12-02 DIAGNOSIS — Z23 IMMUNIZATION DUE: ICD-10-CM

## 2019-12-02 PROBLEM — L21.9 SEBORRHEIC DERMATITIS: Status: ACTIVE | Noted: 2019-10-01

## 2019-12-02 PROCEDURE — 90471 IMMUNIZATION ADMIN: CPT | Performed by: FAMILY MEDICINE

## 2019-12-02 PROCEDURE — 90682 RIV4 VACC RECOMBINANT DNA IM: CPT | Performed by: FAMILY MEDICINE

## 2019-12-02 PROCEDURE — 99213 OFFICE O/P EST LOW 20 MIN: CPT | Mod: 25 | Performed by: FAMILY MEDICINE

## 2019-12-02 RX ORDER — CEFPROZIL 500 MG/1
500 TABLET, FILM COATED ORAL 2 TIMES DAILY
Qty: 20 TABLET | Refills: 0 | Status: SHIPPED | OUTPATIENT
Start: 2019-12-02 | End: 2019-12-12

## 2019-12-02 ASSESSMENT — MIFFLIN-ST. JEOR: SCORE: 1175.5

## 2019-12-02 ASSESSMENT — PAIN SCALES - GENERAL: PAINLEVEL: NO PAIN (0)

## 2019-12-02 NOTE — NURSING NOTE
"Chief Complaint   Patient presents with     Hypertension     Flu Shot       Initial /80   Pulse 91   Resp 19   Ht 1.727 m (5' 8\")   Wt 56.7 kg (125 lb)   SpO2 100%   BMI 19.01 kg/m   Estimated body mass index is 19.01 kg/m  as calculated from the following:    Height as of this encounter: 1.727 m (5' 8\").    Weight as of this encounter: 56.7 kg (125 lb).  Medication Reconciliation: complete  Ashlee Carvajal LPN    "

## 2020-03-17 NOTE — PROGRESS NOTES
Outpatient Physical Therapy Discharge Note     Patient: Estela HARDIN Paol0  : 1955    Beginning/End Dates of Reporting Period:  19-19    Referring Provider: CARMEN Shah Diagnosis: cervicalga     Client Self Report: Pt is performing c traction daily which reduces pain and mm tension significantly.     Objective Measurements:  Objective Measure: CROM  Details: R Rot 100%, L Rot 90 % of wnl, R lat flex 80% of wnl, L lat flex 100% of wnl.   Objective Measure: Neck Disability INdex  Details: 14% (improved from 18% disabilityscore)                 Goals:  Goal Identifier STG 1   Goal Description Pt will demonstrate indep HEP compliance.    Target Date 19   Date Met   19   Progress:     Goal Identifier STG 2   Goal Description Improve CROM to WFL with pain under 4/10 with L Rotation to improve safe driving and consistent sleep   Target Date 19   Date Met   2019   Progress:     Goal Identifier LTG    Goal Description ADLS, sleep and leisure not interrupted by neck pain or tightness   Target Date 19   Date Met   19   Progress:     Goal Identifier     Goal Description     Target Date     Date Met      Progress:     Goal Identifier     Goal Description     Target Date     Date Met      Progress:     Goal Identifier     Goal Description     Target Date     Date Met      Progress:     Goal Identifier     Goal Description     Target Date     Date Met      Progress:     Goal Identifier     Goal Description     Target Date     Date Met      Progress:     Progress Toward Goals:   Progress this reporting period:   This patient has been seen x 7 visits total and has achieved all goals as set. Pt will cont using home traction unit daily and perform HEP.       Plan:  Discharge from therapy.    Discharge:    Reason for Discharge: Patient has met all goals.    Equipment Issued: n/a    Discharge Plan: Patient to continue home program.

## 2020-03-20 DIAGNOSIS — I10 ESSENTIAL HYPERTENSION: ICD-10-CM

## 2020-03-20 DIAGNOSIS — R52 PAIN: ICD-10-CM

## 2020-03-22 RX ORDER — IBUPROFEN 600 MG/1
TABLET, FILM COATED ORAL
Qty: 60 TABLET | Refills: 1 | Status: SHIPPED | OUTPATIENT
Start: 2020-03-22 | End: 2021-05-09

## 2020-03-22 RX ORDER — HYDROCHLOROTHIAZIDE 12.5 MG/1
TABLET ORAL
Qty: 90 TABLET | Refills: 3 | Status: SHIPPED | OUTPATIENT
Start: 2020-03-22 | End: 2021-05-12

## 2020-03-22 RX ORDER — METOPROLOL SUCCINATE 50 MG/1
TABLET, EXTENDED RELEASE ORAL
Qty: 90 TABLET | Refills: 3 | Status: SHIPPED | OUTPATIENT
Start: 2020-03-22 | End: 2021-05-12

## 2020-07-14 NOTE — PROGRESS NOTES
SUBJECTIVE:   CC: Estela Delacruz is an 65 year old woman who presents for preventive health visit.     Healthy Habits:    Do you get at least three servings of calcium containing foods daily (dairy, green leafy vegetables, etc.)? Not a good milk drinker, eats cheese    Amount of exercise or daily activities, outside of work: 5 day(s) per week    Problems taking medications regularly No    Medication side effects: No    Have you had an eye exam in the past two years? yes    Do you see a dentist twice per year? yes    Do you have sleep apnea, excessive snoring or daytime drowsiness?no      Hypertension Follow-up      Do you check your blood pressure regularly outside of the clinic? No     Are you following a low salt diet? Yes    Are your blood pressures ever more than 140 on the top number (systolic) OR more   than 90 on the bottom number (diastolic), for example 140/90? No      Today's PHQ-2 Score:   PHQ-2 ( 1999 Pfizer) 2/27/2019 5/23/2013   Q1: Little interest or pleasure in doing things 0 0   Q2: Feeling down, depressed or hopeless 0 0   PHQ-2 Score 0 0       Abuse: Current or Past(Physical, Sexual or Emotional)- No  Do you feel safe in your environment? Yes        Social History     Tobacco Use     Smoking status: Never Smoker     Smokeless tobacco: Never Used   Substance Use Topics     Alcohol use: Yes     Comment: occasionally     If you drink alcohol do you typically have >3 drinks per day or >7 drinks per week? No      RESPIRATORY SYMPTOMS      Duration: for about a week    Description  nasal congestion, rhinorrhea and facial pain/pressure    Severity: moderate    Accompanying signs and symptoms: None    History (predisposing factors):  none    Precipitating or alleviating factors: None    Therapies tried and outcome:  rest and fluids oral decongestant  She is having pain above the left eye, in the usual area she has had symptoms in the past                  Reviewed orders with patient.  Reviewed health  maintenance and updated orders accordingly - Yes  BP Readings from Last 3 Encounters:   07/20/20 128/78   12/02/19 138/80   05/16/19 130/78    Wt Readings from Last 3 Encounters:   07/20/20 56.7 kg (125 lb)   12/02/19 56.7 kg (125 lb)   05/16/19 56.7 kg (125 lb)                  Patient Active Problem List   Diagnosis     Varicose veins of legs     Essential hypertension     Sinusitis, chronic     Esophageal reflux     Basal cell carcinoma     Advanced care planning/counseling discussion     Telangiectasia of limb     Actinic keratosis     Personal history of malignant neoplasm of skin     Seborrheic dermatitis     History of nonmelanoma skin cancer     Past Surgical History:   Procedure Laterality Date     Arthroscopy right meniscal tear  2006    Dr Orozco     Arthroscopy right shoulder  2010     COLONOSCOPY  2008    Family history of colon cancer (mother). Negative. Repeat 2013     DILATION AND CURETTAGE       Removed mass left neck  2007    Dr Cervantes     Septoplasty and sinus surgery  2001     [place] teeth removed         Social History     Tobacco Use     Smoking status: Never Smoker     Smokeless tobacco: Never Used   Substance Use Topics     Alcohol use: Yes     Comment: occasionally     Family History   Problem Relation Age of Onset     Cancer Mother         colon     Cancer - colorectal Sister      Hypertension No family hx of      Cerebrovascular Disease No family hx of          Current Outpatient Medications   Medication Sig Dispense Refill     albuterol (PROAIR HFA/PROVENTIL HFA/VENTOLIN HFA) 108 (90 Base) MCG/ACT inhaler Inhale 2 puffs into the lungs every 6 hours as needed for shortness of breath / dyspnea or wheezing 1 Inhaler 0     ASPIR-LOW 81 MG EC tablet TAKE ONE TABLET BY MOUTH DAILY 90 tablet 2     cefPROZIL (CEFZIL) 500 MG tablet Take 1 tablet (500 mg) by mouth 2 times daily for 10 days 20 tablet 0     clonazePAM (KLONOPIN) 0.5 MG tablet TAKE 1/2 TO 1 TABLET BY MOUTH TWICE DAILY AS NEEDED FOR  ANXIETY 60 tablet 0     estrogens, conjugated, (PREMARIN) 0.625 MG tablet Take  by mouth daily.       hydrochlorothiazide (HYDRODIURIL) 12.5 MG tablet TAKE 1 TABLET(12.5 MG) BY MOUTH DAILY 90 tablet 3     ibuprofen (ADVIL/MOTRIN) 600 MG tablet TAKE 1 TABLET BY MOUTH EVERY 8 HOURS AS NEEDED FOR PAIN 60 tablet 1     medroxyPROGESTERone (PROVERA) 10 MG tablet Take 10 mg by mouth. takes every other month days 1-12       metoprolol succinate ER (TOPROL-XL) 50 MG 24 hr tablet TAKE 1 TABLET(50 MG) BY MOUTH DAILY 90 tablet 3     metroNIDAZOLE (METROGEL) 0.75 % gel Apply topically 2 times daily Apply h,s. And AM to central face - [id number] 45 g 3     mometasone (NASONEX) 50 MCG/ACT spray SPRAY 2 SPRAYS INTO EACH NOSTRIL DAILY AS NEEDED 17 g 11     nabumetone (RELAFEN) 500 MG tablet Take 1 tablet (500 mg) by mouth 2 times daily as needed for moderate pain 60 tablet 1     NEXIUM 40 MG CR capsule TAKE 1 CAPSULE BY MOUTH DAILY 90 capsule 0     tretinoin (RETIN-A) 0.025 % cream APPLY TOPICALLY AT BEDTIME       No Known Allergies    Mammogram Screening: Patient over age 50, mutual decision to screen reflected in health maintenance.    Pertinent mammograms are reviewed under the imaging tab.  History of abnormal Pap smear: NO - age 30-65 PAP every 5 years with negative HPV co-testing recommended     Reviewed and updated as needed this visit by clinical staff         Reviewed and updated as needed this visit by Provider        Past Medical History:   Diagnosis Date     Asymptomatic varicose veins 02/04/2013     Basal cell carcinoma 04/26/2007    [facility] chest wall. Dr Frey     Esophageal reflux 05/26/2011     Hypertension 03/28/2006     Unspecified sinusitis (chronic) 02/25/2000      Past Surgical History:   Procedure Laterality Date     Arthroscopy right meniscal tear  2006    Dr Orozco     Arthroscopy right shoulder  2010     COLONOSCOPY  2008    Family history of colon cancer (mother). Negative. Repeat 2013     DILATION AND  CURETTAGE       Removed mass left neck  2007    Dr Cervantes     Septoplasty and sinus surgery  2001     [place] teeth removed       OB History   No obstetric history on file.       ROS:  CONSTITUTIONAL: NEGATIVE for fever, chills, change in weight  INTEGUMENTARY/SKIN: NEGATIVE for worrisome rashes, moles or lesions  EYES: NEGATIVE for vision changes or irritation  ENT: NEGATIVE for ear, mouth and throat problems  RESP: NEGATIVE for significant cough or SOB  BREAST: NEGATIVE for masses, tenderness or discharge  CV: NEGATIVE for chest pain, palpitations or peripheral edema  GI: NEGATIVE for nausea, abdominal pain, heartburn, or change in bowel habits  : NEGATIVE for unusual urinary or vaginal symptoms. No vaginal bleeding.   menopausal female: one episode of bleeding in the last year not her usual every other month withdrawal bleeding  MUSCULOSKELETAL: NEGATIVE for significant arthralgias or myalgia  NEURO: NEGATIVE for weakness, dizziness or paresthesias  ENDOCRINE: NEGATIVE for temperature intolerance, skin/hair changes  PSYCHIATRIC: NEGATIVE for changes in mood or affect     OBJECTIVE:   There were no vitals taken for this visit.  EXAM:  GENERAL APPEARANCE: healthy, alert and no distress  EYES: Eyes grossly normal to inspection, PERRL and conjunctivae and sclerae normal  HENT: ear canals and TM's normal, nose and mouth without ulcers or lesions, oropharynx clear and oral mucous membranes moist  NECK: no adenopathy, no asymmetry, masses, or scars and thyroid normal to palpation  RESP: lungs clear to auscultation - no rales, rhonchi or wheezes  BREAST: normal without masses, tenderness or nipple discharge and no palpable axillary masses or adenopathy  CV: regular rate and rhythm, normal S1 S2, no S3 or S4, no murmur, click or rub, no peripheral edema and peripheral pulses strong  ABDOMEN: soft, nontender, no hepatosplenomegaly, no masses and bowel sounds normal  MS: no musculoskeletal defects are noted and gait  "is age appropriate without ataxia  SKIN: no suspicious lesions or rashes  NEURO: Normal strength and tone, sensory exam grossly normal, mentation intact and speech normal  PSYCH: mentation appears normal and affect normal/bright        ASSESSMENT/PLAN:   1. Routine general medical examination at a health care facility  She has had her paps and mammograms done at [facility] in the past with Dr Roa who has now retired. Her last exam was 2018 and the note states a pap was done but I cannot find the results. Her prior pap was 2017 and was negative with negative HPV testing. We discussed recommendations for pap testing. She has never had an abnormal    She is newly engaged and is congratulated    2. Essential hypertension  Controlled, check labs today  - CBC with platelets  - Comprehensive metabolic panel    3. Encounter for screening mammogram for breast cancer  Will order here  - MA Screen Bilateral w/Jose; Future    4. Lipid screening  Check fasting lab today  - Lipid Profile    5. Acute non-recurrent frontal sinusitis  Treat with cefzil 500 mg bid for 10 days   - cefPROZIL (CEFZIL) 500 MG tablet; Take 1 tablet (500 mg) by mouth 2 times daily for 10 days  Dispense: 20 tablet; Refill: 0    6. Hormone replacement therapy  Will refer to gyn to continue to follow this  - OB/GYN REFERRAL    COUNSELING:   Reviewed preventive health counseling, as reflected in patient instructions       Regular exercise       Healthy diet/nutrition       Consider Hep C screening for patients born between 1945 and 1965       HIV screeninx in teen years, 1x in adult years, and at intervals if high risk    Estimated body mass index is 19.01 kg/m  as calculated from the following:    Height as of 19: 1.727 m (5' 8\").    Weight as of 19: 56.7 kg (125 lb).         reports that she has never smoked. She has never used smokeless tobacco.      Counseling Resources:  ATP IV Guidelines  Pooled Cohorts Equation Calculator  Breast Cancer " Risk Calculator  FRAX Risk Assessment  ICSI Preventive Guidelines  Dietary Guidelines for Americans, 2010  USDA's MyPlate  ASA Prophylaxis  Lung CA Screening    Kassandra Chaparro MD  [facility]

## 2020-07-20 ENCOUNTER — OFFICE VISIT (OUTPATIENT)
Dept: FAMILY MEDICINE | Facility: OTHER | Age: 65
End: 2020-07-20
Attending: FAMILY MEDICINE
Payer: COMMERCIAL

## 2020-07-20 VITALS
RESPIRATION RATE: 19 BRPM | SYSTOLIC BLOOD PRESSURE: 128 MMHG | TEMPERATURE: 97.8 F | OXYGEN SATURATION: 98 % | WEIGHT: 125 LBS | DIASTOLIC BLOOD PRESSURE: 78 MMHG | HEART RATE: 62 BPM | HEIGHT: 68 IN | BODY MASS INDEX: 18.94 KG/M2

## 2020-07-20 DIAGNOSIS — I10 ESSENTIAL HYPERTENSION: ICD-10-CM

## 2020-07-20 DIAGNOSIS — Z00.00 ROUTINE GENERAL MEDICAL EXAMINATION AT A HEALTH CARE FACILITY: Primary | ICD-10-CM

## 2020-07-20 DIAGNOSIS — J01.10 ACUTE NON-RECURRENT FRONTAL SINUSITIS: ICD-10-CM

## 2020-07-20 DIAGNOSIS — R71.8 ELEVATED MCV: ICD-10-CM

## 2020-07-20 DIAGNOSIS — Z12.31 ENCOUNTER FOR SCREENING MAMMOGRAM FOR BREAST CANCER: ICD-10-CM

## 2020-07-20 DIAGNOSIS — Z79.890 HORMONE REPLACEMENT THERAPY: ICD-10-CM

## 2020-07-20 DIAGNOSIS — Z13.220 LIPID SCREENING: ICD-10-CM

## 2020-07-20 DIAGNOSIS — R73.9 BLOOD GLUCOSE ELEVATED: Primary | ICD-10-CM

## 2020-07-20 LAB
ALBUMIN SERPL-MCNC: 3.7 G/DL (ref 3.4–5)
ALP SERPL-CCNC: 100 U/L (ref 40–150)
ALT SERPL W P-5'-P-CCNC: 44 U/L (ref 0–50)
ANION GAP SERPL CALCULATED.3IONS-SCNC: 6 MMOL/L (ref 3–14)
AST SERPL W P-5'-P-CCNC: 79 U/L (ref 0–45)
BILIRUB SERPL-MCNC: 0.8 MG/DL (ref 0.2–1.3)
BUN SERPL-MCNC: 8 MG/DL (ref 7–30)
CALCIUM SERPL-MCNC: 9.1 MG/DL (ref 8.5–10.1)
CHLORIDE SERPL-SCNC: 99 MMOL/L (ref 94–109)
CHOLEST SERPL-MCNC: 270 MG/DL
CO2 SERPL-SCNC: 28 MMOL/L (ref 20–32)
CREAT SERPL-MCNC: 0.5 MG/DL (ref 0.52–1.04)
ERYTHROCYTE [DISTWIDTH] IN BLOOD BY AUTOMATED COUNT: 12.8 % (ref 10–15)
EST. AVERAGE GLUCOSE BLD GHB EST-MCNC: 91 MG/DL
GFR SERPL CREATININE-BSD FRML MDRD: >90 ML/MIN/{1.73_M2}
GLUCOSE SERPL-MCNC: 107 MG/DL (ref 70–99)
HBA1C MFR BLD: 4.8 % (ref 0–5.6)
HCT VFR BLD AUTO: 42.8 % (ref 35–47)
HDLC SERPL-MCNC: 149 MG/DL
HGB BLD-MCNC: 14.6 G/DL (ref 11.7–15.7)
LDLC SERPL CALC-MCNC: 103 MG/DL
MCH RBC QN AUTO: 35.3 PG (ref 26.5–33)
MCHC RBC AUTO-ENTMCNC: 34.1 G/DL (ref 31.5–36.5)
MCV RBC AUTO: 103 FL (ref 78–100)
NONHDLC SERPL-MCNC: 121 MG/DL
PLATELET # BLD AUTO: 202 10E9/L (ref 150–450)
POTASSIUM SERPL-SCNC: 4.2 MMOL/L (ref 3.4–5.3)
PROT SERPL-MCNC: 8.5 G/DL (ref 6.8–8.8)
RBC # BLD AUTO: 4.14 10E12/L (ref 3.8–5.2)
SODIUM SERPL-SCNC: 133 MMOL/L (ref 133–144)
TRIGL SERPL-MCNC: 92 MG/DL
WBC # BLD AUTO: 5.5 10E9/L (ref 4–11)

## 2020-07-20 PROCEDURE — 82607 VITAMIN B-12: CPT | Performed by: FAMILY MEDICINE

## 2020-07-20 PROCEDURE — 83036 HEMOGLOBIN GLYCOSYLATED A1C: CPT | Performed by: FAMILY MEDICINE

## 2020-07-20 PROCEDURE — 85027 COMPLETE CBC AUTOMATED: CPT | Performed by: FAMILY MEDICINE

## 2020-07-20 PROCEDURE — 80053 COMPREHEN METABOLIC PANEL: CPT | Performed by: FAMILY MEDICINE

## 2020-07-20 PROCEDURE — 82746 ASSAY OF FOLIC ACID SERUM: CPT | Performed by: FAMILY MEDICINE

## 2020-07-20 PROCEDURE — 36415 COLL VENOUS BLD VENIPUNCTURE: CPT | Performed by: FAMILY MEDICINE

## 2020-07-20 PROCEDURE — 80061 LIPID PANEL: CPT | Performed by: FAMILY MEDICINE

## 2020-07-20 PROCEDURE — 99396 PREV VISIT EST AGE 40-64: CPT | Performed by: FAMILY MEDICINE

## 2020-07-20 RX ORDER — CEFPROZIL 500 MG/1
500 TABLET, FILM COATED ORAL 2 TIMES DAILY
Qty: 20 TABLET | Refills: 0 | Status: SHIPPED | OUTPATIENT
Start: 2020-07-20 | End: 2020-07-30

## 2020-07-20 ASSESSMENT — PAIN SCALES - GENERAL: PAINLEVEL: NO PAIN (0)

## 2020-07-20 ASSESSMENT — ANXIETY QUESTIONNAIRES
1. FEELING NERVOUS, ANXIOUS, OR ON EDGE: SEVERAL DAYS
5. BEING SO RESTLESS THAT IT IS HARD TO SIT STILL: NOT AT ALL
IF YOU CHECKED OFF ANY PROBLEMS ON THIS QUESTIONNAIRE, HOW DIFFICULT HAVE THESE PROBLEMS MADE IT FOR YOU TO DO YOUR WORK, TAKE CARE OF THINGS AT HOME, OR GET ALONG WITH OTHER PEOPLE: NOT DIFFICULT AT ALL
6. BECOMING EASILY ANNOYED OR IRRITABLE: SEVERAL DAYS
7. FEELING AFRAID AS IF SOMETHING AWFUL MIGHT HAPPEN: NOT AT ALL
2. NOT BEING ABLE TO STOP OR CONTROL WORRYING: SEVERAL DAYS
GAD7 TOTAL SCORE: 4
3. WORRYING TOO MUCH ABOUT DIFFERENT THINGS: SEVERAL DAYS

## 2020-07-20 ASSESSMENT — MIFFLIN-ST. JEOR: SCORE: 1170.5

## 2020-07-20 ASSESSMENT — PATIENT HEALTH QUESTIONNAIRE - PHQ9
SUM OF ALL RESPONSES TO PHQ QUESTIONS 1-9: 0
5. POOR APPETITE OR OVEREATING: NOT AT ALL

## 2020-07-20 NOTE — NURSING NOTE
"Chief Complaint   Patient presents with     Physical       Initial /78   Pulse 62   Temp 97.8  F (36.6  C) (Tympanic)   Resp 19   Ht 1.727 m (5' 8\")   Wt 56.7 kg (125 lb)   SpO2 98%   BMI 19.01 kg/m   Estimated body mass index is 19.01 kg/m  as calculated from the following:    Height as of this encounter: 1.727 m (5' 8\").    Weight as of this encounter: 56.7 kg (125 lb).  Medication Reconciliation: complete  Ashlee Carvajal LPN    "

## 2020-07-21 LAB
FOLATE SERPL-MCNC: 17.4 NG/ML
VIT B12 SERPL-MCNC: 539 PG/ML (ref 193–986)

## 2020-07-21 ASSESSMENT — ANXIETY QUESTIONNAIRES: GAD7 TOTAL SCORE: 4

## 2020-07-22 DIAGNOSIS — R71.8 ELEVATED MCV: Primary | ICD-10-CM

## 2020-11-09 ENCOUNTER — OFFICE VISIT (OUTPATIENT)
Dept: FAMILY MEDICINE | Facility: OTHER | Age: 65
End: 2020-11-09
Attending: FAMILY MEDICINE
Payer: COMMERCIAL

## 2020-11-09 ENCOUNTER — NURSE TRIAGE (OUTPATIENT)
Dept: FAMILY MEDICINE | Facility: OTHER | Age: 65
End: 2020-11-09

## 2020-11-09 DIAGNOSIS — Z20.822 SUSPECTED COVID-19 VIRUS INFECTION: Primary | ICD-10-CM

## 2020-11-09 DIAGNOSIS — Z20.822 SUSPECTED COVID-19 VIRUS INFECTION: ICD-10-CM

## 2020-11-09 PROCEDURE — U0003 INFECTIOUS AGENT DETECTION BY NUCLEIC ACID (DNA OR RNA); SEVERE ACUTE RESPIRATORY SYNDROME CORONAVIRUS 2 (SARS-COV-2) (CORONAVIRUS DISEASE [COVID-19]), AMPLIFIED PROBE TECHNIQUE, MAKING USE OF HIGH THROUGHPUT TECHNOLOGIES AS DESCRIBED BY CMS-2020-01-R: HCPCS | Performed by: FAMILY MEDICINE

## 2020-11-09 NOTE — TELEPHONE ENCOUNTER
"    Reason for Disposition    [1] COVID-19 EXPOSURE (Close Contact) within last 14 days AND [2] needs COVID-19 lab test to return to work AND [3] NO symptoms    Answer Assessment - Initial Assessment Questions  1. CLOSE CONTACT: \"Who is the person with the confirmed or suspected COVID-19 infection that you were exposed to?\"      son  2. PLACE of CONTACT: \"Where were you when you were exposed to COVID-19?\" (e.g., home, school, medical waiting room; which city?)      home  3. TYPE of CONTACT: \"How much contact was there?\" (e.g., sitting next to, live in same house, work in same office, same building)      Live with  4. DURATION of CONTACT: \"How long were you in contact with the COVID-19 patient?\" (e.g., a few seconds, passed by person, a few minutes, live with the patient)      All day  5. DATE of CONTACT: \"When did you have contact with a COVID-19 patient?\" (e.g., how many days ago)      11/5  6. TRAVEL: \"Have you traveled out of the country recently?\" If so, \"When and where?\"      * Also ask about out-of-state travel, since the CDC has identified some high-risk cities for community spread in the .      * Note: Travel becomes less relevant if there is widespread community transmission where the patient lives.      no  7. COMMUNITY SPREAD: \"Are there lots of cases of COVID-19 (community spread) where you live?\" (See public health department website, if unsure)        yes  8. SYMPTOMS: \"Do you have any symptoms?\" (e.g., fever, cough, breathing difficulty)      no  9. PREGNANCY OR POSTPARTUM: \"Is there any chance you are pregnant?\" \"When was your last menstrual period?\" \"Did you deliver in the last 2 weeks?\"      no  10. HIGH RISK: \"Do you have any heart or lung problems? Do you have a weak immune system?\" (e.g., CHF, COPD, asthma, HIV positive, chemotherapy, renal failure, diabetes mellitus, sickle cell anemia)        no    Protocols used: CORONAVIRUS (COVID-19) EXPOSURE-A- 8.4.20      "

## 2020-11-10 LAB
SARS-COV-2 RNA SPEC QL NAA+PROBE: NOT DETECTED
SPECIMEN SOURCE: NORMAL

## 2020-12-03 ENCOUNTER — TELEPHONE (OUTPATIENT)
Dept: FAMILY MEDICINE | Facility: OTHER | Age: 65
End: 2020-12-03

## 2020-12-03 ENCOUNTER — OFFICE VISIT (OUTPATIENT)
Dept: FAMILY MEDICINE | Facility: OTHER | Age: 65
End: 2020-12-03
Attending: FAMILY MEDICINE
Payer: COMMERCIAL

## 2020-12-03 VITALS
HEIGHT: 68 IN | WEIGHT: 128 LBS | RESPIRATION RATE: 19 BRPM | TEMPERATURE: 97.1 F | OXYGEN SATURATION: 98 % | HEART RATE: 73 BPM | DIASTOLIC BLOOD PRESSURE: 84 MMHG | SYSTOLIC BLOOD PRESSURE: 138 MMHG | BODY MASS INDEX: 19.4 KG/M2

## 2020-12-03 DIAGNOSIS — Z23 NEED FOR PROPHYLACTIC VACCINATION AND INOCULATION AGAINST INFLUENZA: ICD-10-CM

## 2020-12-03 DIAGNOSIS — Z85.828 HISTORY OF SKIN CANCER: ICD-10-CM

## 2020-12-03 DIAGNOSIS — R82.90 ABNORMAL URINE FINDINGS: ICD-10-CM

## 2020-12-03 DIAGNOSIS — R35.0 URINARY FREQUENCY: Primary | ICD-10-CM

## 2020-12-03 DIAGNOSIS — Z12.31 ENCOUNTER FOR SCREENING MAMMOGRAM FOR BREAST CANCER: ICD-10-CM

## 2020-12-03 DIAGNOSIS — N30.00 ACUTE CYSTITIS WITHOUT HEMATURIA: ICD-10-CM

## 2020-12-03 LAB
ALBUMIN UR-MCNC: NEGATIVE MG/DL
APPEARANCE UR: CLEAR
BACTERIA #/AREA URNS HPF: ABNORMAL /HPF
BILIRUB UR QL STRIP: NEGATIVE
COLOR UR AUTO: ABNORMAL
GLUCOSE UR STRIP-MCNC: NEGATIVE MG/DL
HGB UR QL STRIP: ABNORMAL
KETONES UR STRIP-MCNC: 10 MG/DL
LEUKOCYTE ESTERASE UR QL STRIP: ABNORMAL
MUCOUS THREADS #/AREA URNS LPF: PRESENT /LPF
NITRATE UR QL: NEGATIVE
PH UR STRIP: 5 PH (ref 4.7–8)
RBC #/AREA URNS AUTO: 7 /HPF (ref 0–2)
SOURCE: ABNORMAL
SP GR UR STRIP: 1.01 (ref 1–1.03)
SQUAMOUS #/AREA URNS AUTO: 2 /HPF (ref 0–1)
UROBILINOGEN UR STRIP-MCNC: NORMAL MG/DL (ref 0–2)
WBC #/AREA URNS AUTO: 38 /HPF (ref 0–5)
WBC CLUMPS #/AREA URNS HPF: PRESENT /HPF

## 2020-12-03 PROCEDURE — 87186 SC STD MICRODIL/AGAR DIL: CPT | Performed by: FAMILY MEDICINE

## 2020-12-03 PROCEDURE — 99213 OFFICE O/P EST LOW 20 MIN: CPT | Mod: 25 | Performed by: FAMILY MEDICINE

## 2020-12-03 PROCEDURE — 81001 URINALYSIS AUTO W/SCOPE: CPT | Performed by: FAMILY MEDICINE

## 2020-12-03 PROCEDURE — 87088 URINE BACTERIA CULTURE: CPT | Performed by: FAMILY MEDICINE

## 2020-12-03 PROCEDURE — 90471 IMMUNIZATION ADMIN: CPT | Performed by: FAMILY MEDICINE

## 2020-12-03 PROCEDURE — 90682 RIV4 VACC RECOMBINANT DNA IM: CPT | Performed by: FAMILY MEDICINE

## 2020-12-03 PROCEDURE — 87086 URINE CULTURE/COLONY COUNT: CPT | Performed by: FAMILY MEDICINE

## 2020-12-03 RX ORDER — SULFAMETHOXAZOLE/TRIMETHOPRIM 800-160 MG
1 TABLET ORAL 2 TIMES DAILY
Qty: 10 TABLET | Refills: 0 | Status: SHIPPED | OUTPATIENT
Start: 2020-12-03 | End: 2020-12-08

## 2020-12-03 ASSESSMENT — PAIN SCALES - GENERAL: PAINLEVEL: NO PAIN (0)

## 2020-12-03 ASSESSMENT — MIFFLIN-ST. JEOR: SCORE: 1184.1

## 2020-12-03 NOTE — NURSING NOTE
"Chief Complaint   Patient presents with     UTI       Initial /84   Pulse 73   Temp 97.1  F (36.2  C) (Tympanic)   Resp 19   Ht 1.727 m (5' 8\")   Wt 58.1 kg (128 lb)   SpO2 98%   BMI 19.46 kg/m   Estimated body mass index is 19.46 kg/m  as calculated from the following:    Height as of this encounter: 1.727 m (5' 8\").    Weight as of this encounter: 58.1 kg (128 lb).  Medication Reconciliation: complete  Ashlee Carvajal LPN    "

## 2020-12-03 NOTE — PROGRESS NOTES
"Subjective     Estela Delacruz is a 65 year old female who presents to clinic today for the following health issues:    HPI         Genitourinary - Female  Onset/Duration: started yesterday   Description:   Painful urination (Dysuria): no           Frequency: YES  Blood in urine (Hematuria): no  Delay in urine (Hesitency): no  Intensity: 0/10  Progression of Symptoms:  same  Accompanying Signs & Symptoms:  Fever/chills: no  Flank pain: no  Nausea and vomiting: no  Vaginal symptoms: none  Abdominal/Pelvic Pain: no  History:   History of frequent UTI s: YES  History of kidney stones: no  Sexually Active: YES  Possibility of pregnancy: No  Precipitating or alleviating factors: None  Therapies tried and outcome: none      Estela needs a referral for dermatology for an appointment she had yesterday at [facility] with Allison Lyle. She is seen every 6-12 months for a history of skin cancer     Review of Systems   Constitutional, HEENT, cardiovascular, pulmonary, gi and gu systems are negative, except as otherwise noted.      Objective    /84   Pulse 73   Temp 97.1  F (36.2  C) (Tympanic)   Resp 19   Ht 1.727 m (5' 8\")   Wt 58.1 kg (128 lb)   SpO2 98%   BMI 19.46 kg/m    Body mass index is 19.46 kg/m .  Physical Exam   GENERAL: healthy, alert and no distress  MS: no flank tenderness  NEURO: Normal strength and tone, mentation intact and speech normal  PSYCH: mentation appears normal, affect normal/bright            Assessment & Plan     Urinary frequency  UTI  - UA reflex to Microscopic and Culture - [place]    Need for prophylactic vaccination and inoculation against influenza  Given today  - MA RIV4 (FLUBLOK) VACCINE RECOMBINANT DNA PRSRV ANTIBIO FREE, IM [[id number]]      History of skin cancer  Referral done, to cover yesterday's appointment as well   - DERMATOLOGY ADULT REFERRAL; Future    Acute cystitis without hematuria  Take twice daily for 5 days  - sulfamethoxazole-trimethoprim (BACTRIM DS) 800-160 " MG tablet; Take 1 tablet by mouth 2 times daily for 5 days    Encounter for screening mammogram for breast cancer  Due, ordered - MA SCREENING DIGITAL BILATERAL ([place]); Future            Return if symptoms worsen or fail to improve.    Kassandra Chaparro MD  [facility] - [place]

## 2020-12-03 NOTE — TELEPHONE ENCOUNTER
8:02 AM    Reason for Call: Phone Call    Description: pt wondering if she could be squeezed in for possible UTI/ please call    Was an appointment offered for this call? No  If yes : Appointment type              Date    Preferred method for responding to this message: Telephone Call  What is your phone number ? [phone]    If we cannot reach you directly, may we leave a detailed response at the number you provided? Yes    Can this message wait until your PCP/provider returns, if available today? YES, No, provider is in    Caprice Irizarry

## 2020-12-05 LAB
BACTERIA SPEC CULT: ABNORMAL
SPECIMEN SOURCE: ABNORMAL

## 2021-01-06 ENCOUNTER — ANCILLARY PROCEDURE (OUTPATIENT)
Dept: MAMMOGRAPHY | Facility: OTHER | Age: 66
End: 2021-01-06
Attending: FAMILY MEDICINE
Payer: COMMERCIAL

## 2021-01-06 DIAGNOSIS — Z12.31 ENCOUNTER FOR SCREENING MAMMOGRAM FOR BREAST CANCER: ICD-10-CM

## 2021-01-06 PROCEDURE — 77067 SCR MAMMO BI INCL CAD: CPT | Mod: TC | Performed by: RADIOLOGY

## 2021-02-19 ENCOUNTER — TELEPHONE (OUTPATIENT)
Dept: FAMILY MEDICINE | Facility: OTHER | Age: 66
End: 2021-02-19

## 2021-02-19 NOTE — TELEPHONE ENCOUNTER
Patient called reporting a sinus infection would like a call back. Requesting a prescription to treat this.

## 2021-02-22 ENCOUNTER — OFFICE VISIT (OUTPATIENT)
Dept: FAMILY MEDICINE | Facility: OTHER | Age: 66
End: 2021-02-22
Attending: FAMILY MEDICINE
Payer: COMMERCIAL

## 2021-02-22 ENCOUNTER — ANCILLARY PROCEDURE (OUTPATIENT)
Dept: GENERAL RADIOLOGY | Facility: OTHER | Age: 66
End: 2021-02-22
Attending: FAMILY MEDICINE
Payer: COMMERCIAL

## 2021-02-22 VITALS
HEART RATE: 79 BPM | TEMPERATURE: 97 F | BODY MASS INDEX: 19.4 KG/M2 | WEIGHT: 128 LBS | HEIGHT: 68 IN | RESPIRATION RATE: 19 BRPM | SYSTOLIC BLOOD PRESSURE: 136 MMHG | DIASTOLIC BLOOD PRESSURE: 74 MMHG | OXYGEN SATURATION: 99 %

## 2021-02-22 DIAGNOSIS — G89.29 CHRONIC PAIN OF LEFT KNEE: ICD-10-CM

## 2021-02-22 DIAGNOSIS — M25.562 CHRONIC PAIN OF LEFT KNEE: ICD-10-CM

## 2021-02-22 DIAGNOSIS — J01.10 ACUTE NON-RECURRENT FRONTAL SINUSITIS: Primary | ICD-10-CM

## 2021-02-22 PROCEDURE — 73562 X-RAY EXAM OF KNEE 3: CPT | Mod: TC | Performed by: RADIOLOGY

## 2021-02-22 PROCEDURE — 99213 OFFICE O/P EST LOW 20 MIN: CPT | Performed by: FAMILY MEDICINE

## 2021-02-22 ASSESSMENT — PAIN SCALES - GENERAL: PAINLEVEL: MILD PAIN (2)

## 2021-02-22 ASSESSMENT — MIFFLIN-ST. JEOR: SCORE: 1184.1

## 2021-02-22 NOTE — NURSING NOTE
"Chief Complaint   Patient presents with     Sinus Problem     Musculoskeletal Problem       Initial /74   Pulse 79   Temp 97  F (36.1  C) (Tympanic)   Resp 19   Ht 1.727 m (5' 8\")   Wt 58.1 kg (128 lb)   SpO2 99%   BMI 19.46 kg/m   Estimated body mass index is 19.46 kg/m  as calculated from the following:    Height as of this encounter: 1.727 m (5' 8\").    Weight as of this encounter: 58.1 kg (128 lb).  Medication Reconciliation: complete  Ashlee Carvajal LPN    "

## 2021-05-07 DIAGNOSIS — R52 PAIN: ICD-10-CM

## 2021-05-09 RX ORDER — IBUPROFEN 600 MG/1
TABLET, FILM COATED ORAL
Qty: 60 TABLET | Refills: 1 | Status: SHIPPED | OUTPATIENT
Start: 2021-05-09 | End: 2021-10-06

## 2021-05-09 NOTE — TELEPHONE ENCOUNTER
Ibuprofen       Last Written Prescription Date:  3/22/2020  Last Fill Quantity: 60,   # refills: 1  Last Office Visit: 2/22/2021  Future Office visit:

## 2021-05-12 DIAGNOSIS — I10 ESSENTIAL HYPERTENSION: ICD-10-CM

## 2021-05-12 RX ORDER — METOPROLOL SUCCINATE 50 MG/1
TABLET, EXTENDED RELEASE ORAL
Qty: 90 TABLET | Refills: 3 | Status: SHIPPED | OUTPATIENT
Start: 2021-05-12 | End: 2022-10-03

## 2021-05-12 RX ORDER — HYDROCHLOROTHIAZIDE 12.5 MG/1
TABLET ORAL
Qty: 90 TABLET | Refills: 3 | Status: SHIPPED | OUTPATIENT
Start: 2021-05-12 | End: 2022-08-01 | Stop reason: SINTOL

## 2021-05-12 NOTE — TELEPHONE ENCOUNTER
HCTZ      Last Written Prescription Date:  3/22/20  Last Fill Quantity: 90,   # refills: 3  Last Office Visit: 2/22/21  Future Office visit:       Routing refill request to provider for review/approval because:      Metoprolol      Last Written Prescription Date:  3/22/20  Last Fill Quantity: 90,   # refills: 3  Last Office Visit: 2/22/21  Future Office visit:       Routing refill request to provider for review/approval because:

## 2021-10-05 DIAGNOSIS — R52 PAIN: ICD-10-CM

## 2021-10-06 RX ORDER — IBUPROFEN 600 MG/1
TABLET, FILM COATED ORAL
Qty: 60 TABLET | Refills: 1 | Status: SHIPPED | OUTPATIENT
Start: 2021-10-06 | End: 2022-04-28

## 2021-10-06 NOTE — TELEPHONE ENCOUNTER
ibuprofen      Last Written Prescription Date:  5/9/21  Last Fill Quantity: 60,   # refills: 1  Last Office Visit: 2/22/21  Future Office visit:

## 2021-10-27 ENCOUNTER — TELEPHONE (OUTPATIENT)
Dept: FAMILY MEDICINE | Facility: OTHER | Age: 66
End: 2021-10-27

## 2021-10-27 NOTE — TELEPHONE ENCOUNTER
2:06 PM    Reason for Call: OVERBOOK    Patient is having the following symptoms: ear pain/ right hip pain for 1  weeks.    The patient is requesting an appointment for tomorrow with Dr. Kassandra Chaparro.    Was an appointment offered for this call? No  If yes : Appointment type              Date    Preferred method for responding to this message: Telephone Call  What is your phone number ? [phone]    If we cannot reach you directly, may we leave a detailed response at the number you provided? Yes    Can this message wait until your PCP/provider returns, if unavailable today? No    Cathy Brantley

## 2021-10-28 NOTE — TELEPHONE ENCOUNTER
Please schedule patient for date/time: unable to see today, may see another provider   Have patient go to ER/Urgent Care Center. Urgent Care hours are 9:30 am to 8 pm, open 7 days a week. No.    Provider will call patient.No.    Other:

## 2021-10-28 NOTE — PROGRESS NOTES
Assessment & Plan     Acute recurrent maxillary sinusitis    - amoxicillin-clavulanate (AUGMENTIN) 875-125 MG tablet; Take 1 tablet by mouth 2 times daily  Treat with Augmentin and have her use of Flonase and follow-up as needed.  Of note her blood pressure was elevated today she admits she had a lot of coffee this morning I told her to limit to 1 cup a day.  Is probably up also because of the pain she has in her sinuses and her hip.    Hip pain, left    - XR Hip Left 2-3 Views; Future  - Orthopedic  Referral; Future  This is been going on for over 6 months suspect has arthritis we will get an x-ray and [facility]                   R Saji Chaparro MD  [facility] - BHAVNA Palmer is a 66 year old who presents for the following health issues     HPI     Musculoskeletal problem/pain  Onset/Duration: Months  Description  Location: Hip - left  Joint Swelling: no  Redness: no  Pain: YES- 5/10  Warmth: no  Intensity:  moderate, 5/10  Progression of Symptoms:  worsening and intermittent  Accompanying signs and symptoms:   Fevers: no  Numbness/tingling/weakness: no  History  Trauma to the area: no  Recent illness:  no  Previous similar problem: YES  Previous evaluation:  no  Precipitating or alleviating factors:  Aggravating factors include: sitting, standing, walking, climbing stairs, exercise and overuse  Therapies tried and outcome: nothing    Concern - Ear pain  Onset: 2 months  Description: ear pain  Intensity: moderate  Progression of Symptoms:  worsening and intermittent  Accompanying Signs & Symptoms: Sinus issues  Previous history of similar problem: Yes  Precipitating factors:        Worsened by: NA  Alleviating factors:        Improved by: antibiotics  Therapies tried and outcome:  none   She has a history of sinus problems and this feels more like sinus congestion      Review of Systems   Constitutional, HEENT,  "cardiovascular, pulmonary, gi and gu systems are negative, except as otherwise noted.      Objective    BP (!) 148/88   Pulse 80   Temp 97.8  F (36.6  C) (Tympanic)   Resp 16   Ht 1.727 m (5' 8\")   Wt 59 kg (130 lb)   BMI 19.77 kg/m    Body mass index is 19.77 kg/m .  Physical Exam   GENERAL: healthy, alert and no distress  EYES: Eyes grossly normal to inspection, PERRL and conjunctivae and sclerae normal  HENT: ear canals and TM's normal, nose and mouth without ulcers or lesions, she has maxillary sinus tenderness  NECK: no adenopathy, no asymmetry, masses, or scars and thyroid normal to palpation  RESP: lungs clear to auscultation - no rales, rhonchi or wheezes  CV: regular rate and rhythm, normal S1 S2, no S3 or S4, no murmur, click or rub, no peripheral edema and peripheral pulses strong  ABDOMEN: soft, nontender, no hepatosplenomegaly, no masses and bowel sounds normal  MS: Has pain left hip with external rotation of left femur.  Also little tenderness over the greater trochanteric area no lumbar tenderness has negative straight leg                "

## 2021-10-29 ENCOUNTER — OFFICE VISIT (OUTPATIENT)
Dept: FAMILY MEDICINE | Facility: OTHER | Age: 66
End: 2021-10-29
Attending: FAMILY MEDICINE
Payer: COMMERCIAL

## 2021-10-29 ENCOUNTER — ANCILLARY PROCEDURE (OUTPATIENT)
Dept: GENERAL RADIOLOGY | Facility: OTHER | Age: 66
End: 2021-10-29
Attending: FAMILY MEDICINE
Payer: COMMERCIAL

## 2021-10-29 VITALS
HEART RATE: 80 BPM | DIASTOLIC BLOOD PRESSURE: 88 MMHG | RESPIRATION RATE: 16 BRPM | WEIGHT: 130 LBS | SYSTOLIC BLOOD PRESSURE: 148 MMHG | HEIGHT: 68 IN | BODY MASS INDEX: 19.7 KG/M2 | TEMPERATURE: 97.8 F

## 2021-10-29 DIAGNOSIS — M25.552 HIP PAIN, LEFT: ICD-10-CM

## 2021-10-29 DIAGNOSIS — J01.01 ACUTE RECURRENT MAXILLARY SINUSITIS: Primary | ICD-10-CM

## 2021-10-29 PROCEDURE — 99214 OFFICE O/P EST MOD 30 MIN: CPT | Performed by: FAMILY MEDICINE

## 2021-10-29 PROCEDURE — 73502 X-RAY EXAM HIP UNI 2-3 VIEWS: CPT | Mod: TC | Performed by: RADIOLOGY

## 2021-10-29 ASSESSMENT — PAIN SCALES - GENERAL: PAINLEVEL: MODERATE PAIN (4)

## 2021-10-29 ASSESSMENT — MIFFLIN-ST. JEOR: SCORE: 1188.18

## 2021-10-29 NOTE — NURSING NOTE
"Chief Complaint   Patient presents with     Musculoskeletal Problem     Ear Problem       Initial BP (!) 152/90 (BP Location: Left arm, Patient Position: Sitting, Cuff Size: Adult Regular)   Pulse 80   Temp 97.8  F (36.6  C) (Tympanic)   Resp 16   Ht 1.727 m (5' 8\")   Wt 59 kg (130 lb)   BMI 19.77 kg/m   Estimated body mass index is 19.77 kg/m  as calculated from the following:    Height as of this encounter: 1.727 m (5' 8\").    Weight as of this encounter: 59 kg (130 lb).  Medication Reconciliation: complete  Mariah Bennett LPN  "

## 2021-11-22 ENCOUNTER — TRANSFERRED RECORDS (OUTPATIENT)
Dept: HEALTH INFORMATION MANAGEMENT | Facility: CLINIC | Age: 66
End: 2021-11-22

## 2021-11-23 ENCOUNTER — MEDICAL CORRESPONDENCE (OUTPATIENT)
Dept: HEALTH INFORMATION MANAGEMENT | Facility: HOSPITAL | Age: 66
End: 2021-11-23

## 2021-11-30 ENCOUNTER — TELEPHONE (OUTPATIENT)
Dept: FAMILY MEDICINE | Facility: OTHER | Age: 66
End: 2021-11-30

## 2021-11-30 NOTE — TELEPHONE ENCOUNTER
Need to know if this is a surgeon doing the procedure or gastroenterologist. I have no idea what the date of service is

## 2021-11-30 NOTE — TELEPHONE ENCOUNTER
Asia from [facility] asking for insurance referral. She is having a colonoscopy done at [facility]. Needs insurance referral for colonoscopy.Needs NPI, date of service,and diagnosis.    Fax [phone]    Call back with questions at [phone]    Izzy Melara RN

## 2021-12-01 NOTE — TELEPHONE ENCOUNTER
[facility] called asking for status of referral. Messages below will be sent to staff who works on insurance referrals.    Asia hines [facility] added the following information:  Date of service: 12/30/21  Gastroenterologists NPI number: [id number]  Facility NPI: [id number]  Diagnosis: Z86.010

## 2021-12-01 NOTE — TELEPHONE ENCOUNTER
Insurance referral has been sent to [employer] for [facility] DOS:12/30/2021. Form scanned into Epic.

## 2021-12-14 ENCOUNTER — HOSPITAL ENCOUNTER (OUTPATIENT)
Dept: INTERVENTIONAL RADIOLOGY/VASCULAR | Facility: HOSPITAL | Age: 66
End: 2021-12-14
Attending: ORTHOPAEDIC SURGERY | Admitting: RADIOLOGY
Payer: COMMERCIAL

## 2021-12-14 ENCOUNTER — HOSPITAL ENCOUNTER (OUTPATIENT)
Facility: HOSPITAL | Age: 66
Discharge: HOME | End: 2021-12-14
Attending: RADIOLOGY | Admitting: RADIOLOGY
Payer: COMMERCIAL

## 2021-12-14 DIAGNOSIS — M25.552 LEFT HIP PAIN: ICD-10-CM

## 2021-12-14 DIAGNOSIS — M16.10 HIP ARTHRITIS: ICD-10-CM

## 2021-12-14 PROCEDURE — 250N000009 HC RX 250: Performed by: RADIOLOGY

## 2021-12-14 PROCEDURE — 250N000011 HC RX IP 250 OP 636: Performed by: RADIOLOGY

## 2021-12-14 PROCEDURE — 20610 DRAIN/INJ JOINT/BURSA W/O US: CPT | Mod: LT

## 2021-12-14 PROCEDURE — 255N000002 HC RX 255 OP 636: Performed by: RADIOLOGY

## 2021-12-14 RX ORDER — LIDOCAINE HYDROCHLORIDE 10 MG/ML
10 INJECTION, SOLUTION EPIDURAL; INFILTRATION; INTRACAUDAL; PERINEURAL ONCE
Status: COMPLETED | OUTPATIENT
Start: 2021-12-14 | End: 2021-12-14

## 2021-12-14 RX ORDER — IOPAMIDOL 612 MG/ML
50 INJECTION, SOLUTION INTRAVASCULAR ONCE
Status: COMPLETED | OUTPATIENT
Start: 2021-12-14 | End: 2021-12-14

## 2021-12-14 RX ORDER — TRIAMCINOLONE ACETONIDE 40 MG/ML
40 INJECTION, SUSPENSION INTRA-ARTICULAR; INTRAMUSCULAR ONCE
Status: COMPLETED | OUTPATIENT
Start: 2021-12-14 | End: 2021-12-14

## 2021-12-14 RX ADMIN — IOPAMIDOL 3 ML: 612 INJECTION, SOLUTION INTRAVENOUS at 14:50

## 2021-12-14 RX ADMIN — LIDOCAINE HYDROCHLORIDE 10 ML: 10 INJECTION, SOLUTION EPIDURAL; INFILTRATION; INTRACAUDAL; PERINEURAL at 14:49

## 2021-12-14 RX ADMIN — TRIAMCINOLONE ACETONIDE 40 MG: 40 INJECTION, SUSPENSION INTRA-ARTICULAR; INTRAMUSCULAR at 14:50

## 2021-12-30 ENCOUNTER — TRANSFERRED RECORDS (OUTPATIENT)
Dept: HEALTH INFORMATION MANAGEMENT | Facility: CLINIC | Age: 66
End: 2021-12-30

## 2021-12-30 LAB — POTASSIUM (EXTERNAL): 3.2 MEQ/L (ref 3.4–5.1)

## 2022-01-14 ENCOUNTER — ALLIED HEALTH/NURSE VISIT (OUTPATIENT)
Dept: FAMILY MEDICINE | Facility: OTHER | Age: 67
End: 2022-01-14
Attending: FAMILY MEDICINE
Payer: COMMERCIAL

## 2022-01-14 DIAGNOSIS — Z23 NEED FOR PROPHYLACTIC VACCINATION AND INOCULATION AGAINST INFLUENZA: Primary | ICD-10-CM

## 2022-01-14 PROCEDURE — 90682 RIV4 VACC RECOMBINANT DNA IM: CPT

## 2022-01-14 PROCEDURE — 90471 IMMUNIZATION ADMIN: CPT

## 2022-04-28 DIAGNOSIS — R52 PAIN: ICD-10-CM

## 2022-04-28 RX ORDER — IBUPROFEN 600 MG/1
TABLET, FILM COATED ORAL
Qty: 60 TABLET | Refills: 1 | Status: SHIPPED | OUTPATIENT
Start: 2022-04-28 | End: 2023-01-09

## 2022-04-28 NOTE — TELEPHONE ENCOUNTER
ibuprofen      Last Written Prescription Date:  10/6/21  Last Fill Quantity: 60,   # refills: 1  Last Office Visit: 10/29/21  Future Office visit:       Routing refill request to provider for review/approval because:

## 2022-05-19 ENCOUNTER — MEDICAL CORRESPONDENCE (OUTPATIENT)
Dept: INTERVENTIONAL RADIOLOGY/VASCULAR | Facility: HOSPITAL | Age: 67
End: 2022-05-19
Payer: MEDICARE

## 2022-05-23 ENCOUNTER — DOCUMENTATION ONLY (OUTPATIENT)
Dept: INTERVENTIONAL RADIOLOGY/VASCULAR | Facility: HOSPITAL | Age: 67
End: 2022-05-23
Payer: MEDICARE

## 2022-05-23 NOTE — PROGRESS NOTES
Pt called and message left requesting she call us back (meds for injections and if she could come for injection on 5/24 at 1315 or 1415).  Telephone number [phone] provided.

## 2022-05-24 ENCOUNTER — HOSPITAL ENCOUNTER (OUTPATIENT)
Dept: INTERVENTIONAL RADIOLOGY/VASCULAR | Facility: HOSPITAL | Age: 67
Discharge: HOME | End: 2022-05-24
Attending: ORTHOPAEDIC SURGERY | Admitting: RADIOLOGY
Payer: MEDICARE

## 2022-05-24 ENCOUNTER — HOSPITAL ENCOUNTER (OUTPATIENT)
Facility: HOSPITAL | Age: 67
Discharge: HOME | End: 2022-05-24
Attending: RADIOLOGY | Admitting: RADIOLOGY
Payer: MEDICARE

## 2022-05-24 DIAGNOSIS — M25.552 LEFT HIP PAIN: ICD-10-CM

## 2022-05-24 DIAGNOSIS — M19.90 ARTHRITIS: ICD-10-CM

## 2022-05-24 PROCEDURE — 250N000009 HC RX 250: Performed by: RADIOLOGY

## 2022-05-24 PROCEDURE — 250N000011 HC RX IP 250 OP 636: Performed by: RADIOLOGY

## 2022-05-24 PROCEDURE — 255N000002 HC RX 255 OP 636: Performed by: RADIOLOGY

## 2022-05-24 PROCEDURE — 20610 DRAIN/INJ JOINT/BURSA W/O US: CPT | Mod: LT

## 2022-05-24 RX ORDER — TRIAMCINOLONE ACETONIDE 40 MG/ML
40 INJECTION, SUSPENSION INTRA-ARTICULAR; INTRAMUSCULAR ONCE
Status: COMPLETED | OUTPATIENT
Start: 2022-05-24 | End: 2022-05-24

## 2022-05-24 RX ORDER — IOPAMIDOL 612 MG/ML
50 INJECTION, SOLUTION INTRAVASCULAR ONCE
Status: COMPLETED | OUTPATIENT
Start: 2022-05-24 | End: 2022-05-24

## 2022-05-24 RX ORDER — LIDOCAINE HYDROCHLORIDE 10 MG/ML
10 INJECTION, SOLUTION EPIDURAL; INFILTRATION; INTRACAUDAL; PERINEURAL ONCE
Status: COMPLETED | OUTPATIENT
Start: 2022-05-24 | End: 2022-05-24

## 2022-05-24 RX ADMIN — LIDOCAINE HYDROCHLORIDE 5 ML: 10 INJECTION, SOLUTION EPIDURAL; INFILTRATION; INTRACAUDAL; PERINEURAL at 14:51

## 2022-05-24 RX ADMIN — TRIAMCINOLONE ACETONIDE 40 MG: 40 INJECTION, SUSPENSION INTRA-ARTICULAR; INTRAMUSCULAR at 14:52

## 2022-05-24 RX ADMIN — IOPAMIDOL 5 ML: 612 INJECTION, SOLUTION INTRAVENOUS at 14:51

## 2022-05-24 NOTE — DISCHARGE INSTRUCTIONS
Discharge Instructions for an Radiology Injection    Home number on file [phone] (home)  Is it ok to leave a message at this number(s) Yes    Dr Kee completed your procedure on 5/24/2022.    Current Pain Level (0-10 Scale): 5/10  Post Pain Level (0-10):  2/10      Activity Level:     Do not do any heavy activity or exercise for 24 hours.   Hip Injections:  Do not drive for 4 hours after your injection.  Diet:   Return to your normal diet.  Medications:   If you have stopped taking your Aspirin, Coumadin/Warfarin, Ibuprofen, or any   other blood thinner for this procedure you may resume in the morning unless   your primary care provider has given you other instructions.    Diabetics may see an increase in blood sugar after steroid injections. If you are concerned about your blood sugar, please contact your family doctor.    Site Care:  Remove the bandage and bathe or shower the morning after the procedure.      Please allow two weeks to experience improvement in your pain.  If you have any further issues, please contact your provider.  Call your Primary Care Provider if you have the following (if your primary care provider is not available please seek emergency care):   Nausea with vomiting   Severe headache   Drowsiness or confusion   Redness or drainage at the injection or puncture site   Temperature over 101 degrees F   Other concerns   Increasing joint pain

## 2022-07-27 NOTE — PROGRESS NOTES
Assessment & Plan     Encounter to establish care  followed with Dr. Kassandra Chaparro with last visit 2/22/2021    Encounter for routine adult health examination without abnormal findings  - CBC with platelets  - Comprehensive metabolic panel (BMP + Alb, Alk Phos, ALT, AST, Total. Bili, TP)  - Lipid Profile (Chol, Trig, HDL, LDL calc)    Essential hypertension /Hyponatremia / Hypokalemia  D/t hyponatremia and hypokalemia, will discontinue hydrochlorothiazide  - discontinue hydrochlorothiazide   - lisinopril (ZESTRIL) 20 MG tablet; Take 1 tablet (20 mg) by mouth daily  - Osmolarity ( Serum)  - recheck labs in two weeks, CMP order placed  - BP visit one month    Chronic sinusitis, unspecified location  Discussed saline nasal rinses    Anxiety / Anxiety disorder, unspecified type   Rare use of clonazepam  TSH (8/1/2022): 1.97  - TSH with free T4 reflex    Post-menopausal bleeding / Menopausal syndrome (hot flashes)  One episode of post menopausal bleeding which resolved with decrease of estrogen  - Ob/Gyn Referral, [place]    Post-menopausal  - DX Hip/Pelvis/Spine; Future    Hip pain, left  Chronic  More discussions at next visit    Macrocytosis without anemia  AST>ALT, total bilirubin increased, and HDL high. Consideration for alcohol use  - Vitamin B12  - Folate  - Lab Blood Morphology Pathologist Review    Transaminitis  AST>ALT  - US abd limited placed    Encounter for screening mammogram for breast cancer  - MA SCREENING DIGITAL BILATERAL ([place]); Future    Need for vaccination  - PNEUMOCOCCAL 20 VALENT CONJUGATE (PREVNAR 20)    40 minutes spent on the date of the encounter doing chart review, review of test results, interpretation of tests, patient visit, documentation       See Patient Instructions    Return in about 3 months (around 11/1/2022) for BP Recheck.   Return in one month for BP recheck     Kassandra Prado MD  [facility] - [place]    Subjective   Estela is a 67 year old, presenting  for the following health issues:  Hypertension and Establish Care      HPI     Establish care: followed with Dr. Kassandra Chaparro with last visit 2/22/2021    Hypertension Follow-up      Do you check your blood pressure regularly outside of the clinic? Yes     Are you following a low salt diet? No    Are your blood pressures ever more than 140 on the top number (systolic) OR more   than 90 on the bottom number (diastolic), for example 140/90? Yes    - hydrochlorothiazide 12.5mg  - metoprolol succinate 50mg every day  - home BP are at goal     BP Readings from Last 6 Encounters:   08/01/22 (!) 153/98   10/29/21 (!) 148/88   02/22/21 136/74   12/03/20 138/84   07/20/20 128/78   12/02/19 138/80       # anxiety: rare use  - clonazepam 0.5mg - no recent fills on [place] PDMP    # AUB: no further issues with bleeding   - appt with Dr. Myers on 5/17/2023  - decreased estrogen and no further episodes of bleeding      # colonoscopy (12/30/2021, [facility]): repeat in 5 years   - The examined portion of the ileum was normal.  - The entire examined colon is normal.  - Diverticulosis in the sigmoid colon.  - Internal hemorrhoids.  - Multiple non-bleeding colonic angioectasias.  - No specimens collected.    # sinus issues  - has sinus pressure   - h/o sinus surgery    Labs: cbc, cmp, lipids  - dexa  - mammogram  - pcv20 (updating today)        Review of Systems   Constitutional: Negative for chills and fever.   HENT: Positive for sinus pressure and sinus pain. Negative for congestion.         Some jaw pain   Respiratory: Negative for shortness of breath.    Cardiovascular: Negative for chest pain and palpitations.   Gastrointestinal: Negative for abdominal pain.   Neurological: Positive for headaches.   Psychiatric/Behavioral: The patient is nervous/anxious.           Objective    BP (!) 153/98   Pulse 72   Temp 97.3  F (36.3  C) (Tympanic)   Resp 18   Wt 63 kg (139 lb)   SpO2 99%   BMI 21.13 kg/m    Body mass index is 21.13  kg/m .  Physical Exam  Constitutional:       General: She is not in acute distress.     Appearance: She is not ill-appearing.   Cardiovascular:      Rate and Rhythm: Normal rate and regular rhythm.      Heart sounds: No murmur heard.  Pulmonary:      Effort: Pulmonary effort is normal. No respiratory distress.      Breath sounds: No wheezing or rales.   Musculoskeletal:      Right lower leg: No edema.      Left lower leg: No edema.   Neurological:      Mental Status: She is alert.   Psychiatric:         Mood and Affect: Mood normal.        Results for orders placed or performed in visit on 08/01/22 (from the past 24 hour(s))   CBC with platelets   Result Value Ref Range    WBC Count 5.1 4.0 - 11.0 10e3/uL    RBC Count 3.86 3.80 - 5.20 10e6/uL    Hemoglobin 14.2 11.7 - 15.7 g/dL    Hematocrit 39.9 35.0 - 47.0 %     (H) 78 - 100 fL    MCH 36.8 (H) 26.5 - 33.0 pg    MCHC 35.6 31.5 - 36.5 g/dL    RDW 12.8 10.0 - 15.0 %    Platelet Count 180 150 - 450 10e3/uL   Comprehensive metabolic panel (BMP + Alb, Alk Phos, ALT, AST, Total. Bili, TP)   Result Value Ref Range    Sodium 130 (L) 133 - 144 mmol/L    Potassium 3.2 (L) 3.4 - 5.3 mmol/L    Chloride 95 94 - 109 mmol/L    Carbon Dioxide (CO2) 29 20 - 32 mmol/L    Anion Gap 6 3 - 14 mmol/L    Urea Nitrogen 7 7 - 30 mg/dL    Creatinine 0.56 0.52 - 1.04 mg/dL    Calcium 9.3 8.5 - 10.1 mg/dL    Glucose 109 (H) 70 - 99 mg/dL    Alkaline Phosphatase 126 40 - 150 U/L    AST 61 (H) 0 - 45 U/L    ALT 40 0 - 50 U/L    Protein Total 8.0 6.8 - 8.8 g/dL    Albumin 3.7 3.4 - 5.0 g/dL    Bilirubin Total 1.4 (H) 0.2 - 1.3 mg/dL    GFR Estimate >90 >60 mL/min/1.73m2   Lipid Profile (Chol, Trig, HDL, LDL calc)   Result Value Ref Range    Cholesterol 264 (H) <200 mg/dL    Triglycerides 86 <150 mg/dL    Direct Measure  >=50 mg/dL    LDL Cholesterol Calculated 109 (H) <=100 mg/dL    Non HDL Cholesterol 126 <130 mg/dL    Patient Fasting > 8hrs? No     Narrative     Cholesterol  Desirable:  <200 mg/dL    Triglycerides  Normal:  Less than 150 mg/dL  Borderline High:  150-199 mg/dL  High:  200-499 mg/dL  Very High:  Greater than or equal to 500 mg/dL    Direct Measure HDL  Female:  Greater than or equal to 50 mg/dL   Male:  Greater than or equal to 40 mg/dL    LDL Cholesterol  Desirable:  <100mg/dL  Above Desirable:  100-129 mg/dL   Borderline High:  130-159 mg/dL   High:  160-189 mg/dL   Very High:  >= 190 mg/dL    Non HDL Cholesterol  Desirable:  130 mg/dL  Above Desirable:  130-159 mg/dL  Borderline High:  160-189 mg/dL  High:  190-219 mg/dL  Very High:  Greater than or equal to 220 mg/dL   TSH with free T4 reflex   Result Value Ref Range    TSH 1.97 0.40 - 4.00 mU/L   Lab Blood Morphology Pathologist Review    Narrative    The following orders were created for panel order Lab Blood Morphology Pathologist Review.  Procedure                               Abnormality         Status                     ---------                               -----------         ------                     Bld morphology pathology...[[id number]]                      In process                 CBC with platelets and d...[[id number]]                                                 Reticulocyte count[[id number]]           Normal              Final result               Morphology Tracking[[id number]]                              Final result                 Please view results for these tests on the individual orders.   Osmolarity ( Serum)   Result Value Ref Range    Osmolality Blood 278 (L) 280 - 301 mmol/kg    Narrative    Greater than 385 mmol/kg relates to stupor in hyperglycemia   Greater than 400 mmol/kg can relate to seizures   Greater than 420 mmol/kg can be lethal    Serum Osmalar Gap:   Normal <10   Larger suggest unmeasured substances present in serum (ethanol, methanol, isopropanol, mannitol, ethylene glycol).   Reticulocyte count   Result Value Ref Range    % Reticulocyte 1.7 0.5 - 2.0 %     Absolute Reticulocyte 0.065 0.025 - 0.095 10e6/uL                   .  ..

## 2022-08-01 ENCOUNTER — OFFICE VISIT (OUTPATIENT)
Dept: FAMILY MEDICINE | Facility: OTHER | Age: 67
End: 2022-08-01
Attending: FAMILY MEDICINE
Payer: MEDICARE

## 2022-08-01 VITALS
SYSTOLIC BLOOD PRESSURE: 153 MMHG | BODY MASS INDEX: 21.13 KG/M2 | WEIGHT: 139 LBS | TEMPERATURE: 97.3 F | OXYGEN SATURATION: 99 % | DIASTOLIC BLOOD PRESSURE: 98 MMHG | RESPIRATION RATE: 18 BRPM | HEART RATE: 72 BPM

## 2022-08-01 DIAGNOSIS — Z78.0 POST-MENOPAUSAL: ICD-10-CM

## 2022-08-01 DIAGNOSIS — F41.9 ANXIETY: ICD-10-CM

## 2022-08-01 DIAGNOSIS — Z76.89 ENCOUNTER TO ESTABLISH CARE: ICD-10-CM

## 2022-08-01 DIAGNOSIS — Z23 NEED FOR VACCINATION: ICD-10-CM

## 2022-08-01 DIAGNOSIS — F41.9 ANXIETY DISORDER, UNSPECIFIED TYPE: ICD-10-CM

## 2022-08-01 DIAGNOSIS — E87.1 HYPONATREMIA: ICD-10-CM

## 2022-08-01 DIAGNOSIS — N95.1 MENOPAUSAL SYNDROME (HOT FLASHES): ICD-10-CM

## 2022-08-01 DIAGNOSIS — I10 ESSENTIAL HYPERTENSION: ICD-10-CM

## 2022-08-01 DIAGNOSIS — Z12.31 ENCOUNTER FOR SCREENING MAMMOGRAM FOR BREAST CANCER: ICD-10-CM

## 2022-08-01 DIAGNOSIS — R74.01 TRANSAMINITIS: ICD-10-CM

## 2022-08-01 DIAGNOSIS — M25.552 HIP PAIN, LEFT: ICD-10-CM

## 2022-08-01 DIAGNOSIS — D75.89 MACROCYTOSIS WITHOUT ANEMIA: Primary | ICD-10-CM

## 2022-08-01 DIAGNOSIS — E87.6 HYPOKALEMIA: ICD-10-CM

## 2022-08-01 DIAGNOSIS — Z00.00 ENCOUNTER FOR ROUTINE ADULT HEALTH EXAMINATION WITHOUT ABNORMAL FINDINGS: ICD-10-CM

## 2022-08-01 DIAGNOSIS — J32.9 CHRONIC SINUSITIS, UNSPECIFIED LOCATION: ICD-10-CM

## 2022-08-01 DIAGNOSIS — N95.0 POST-MENOPAUSAL BLEEDING: ICD-10-CM

## 2022-08-01 LAB
ALBUMIN SERPL-MCNC: 3.7 G/DL (ref 3.4–5)
ALP SERPL-CCNC: 126 U/L (ref 40–150)
ALT SERPL W P-5'-P-CCNC: 40 U/L (ref 0–50)
ANION GAP SERPL CALCULATED.3IONS-SCNC: 6 MMOL/L (ref 3–14)
AST SERPL W P-5'-P-CCNC: 61 U/L (ref 0–45)
BILIRUB SERPL-MCNC: 1.4 MG/DL (ref 0.2–1.3)
BUN SERPL-MCNC: 7 MG/DL (ref 7–30)
CALCIUM SERPL-MCNC: 9.3 MG/DL (ref 8.5–10.1)
CHLORIDE BLD-SCNC: 95 MMOL/L (ref 94–109)
CHOLEST SERPL-MCNC: 264 MG/DL
CO2 SERPL-SCNC: 29 MMOL/L (ref 20–32)
CREAT SERPL-MCNC: 0.56 MG/DL (ref 0.52–1.04)
FASTING STATUS PATIENT QL REPORTED: NO
GFR SERPL CREATININE-BSD FRML MDRD: >90 ML/MIN/1.73M2
GLUCOSE BLD-MCNC: 109 MG/DL (ref 70–99)
HDLC SERPL-MCNC: 138 MG/DL
LDLC SERPL CALC-MCNC: 109 MG/DL
NONHDLC SERPL-MCNC: 126 MG/DL
OSMOLALITY SERPL: 278 MMOL/KG (ref 280–301)
POTASSIUM BLD-SCNC: 3.2 MMOL/L (ref 3.4–5.3)
PROT SERPL-MCNC: 8 G/DL (ref 6.8–8.8)
RETICS # AUTO: 0.07 10E6/UL (ref 0.03–0.1)
RETICS/RBC NFR AUTO: 1.7 % (ref 0.5–2)
SODIUM SERPL-SCNC: 130 MMOL/L (ref 133–144)
TRIGL SERPL-MCNC: 86 MG/DL
TSH SERPL DL<=0.005 MIU/L-ACNC: 1.97 MU/L (ref 0.4–4)
VIT B12 SERPL-MCNC: 613 PG/ML (ref 232–1245)

## 2022-08-01 PROCEDURE — 80061 LIPID PANEL: CPT | Mod: ZL | Performed by: FAMILY MEDICINE

## 2022-08-01 PROCEDURE — 85027 COMPLETE CBC AUTOMATED: CPT | Mod: ZL | Performed by: FAMILY MEDICINE

## 2022-08-01 PROCEDURE — 36415 COLL VENOUS BLD VENIPUNCTURE: CPT | Mod: ZL | Performed by: FAMILY MEDICINE

## 2022-08-01 PROCEDURE — 85004 AUTOMATED DIFF WBC COUNT: CPT | Mod: ZL | Performed by: FAMILY MEDICINE

## 2022-08-01 PROCEDURE — G0463 HOSPITAL OUTPT CLINIC VISIT: HCPCS | Mod: 25

## 2022-08-01 PROCEDURE — 82607 VITAMIN B-12: CPT | Mod: ZL | Performed by: FAMILY MEDICINE

## 2022-08-01 PROCEDURE — 80053 COMPREHEN METABOLIC PANEL: CPT | Mod: ZL | Performed by: FAMILY MEDICINE

## 2022-08-01 PROCEDURE — 84443 ASSAY THYROID STIM HORMONE: CPT | Mod: ZL | Performed by: FAMILY MEDICINE

## 2022-08-01 PROCEDURE — 83930 ASSAY OF BLOOD OSMOLALITY: CPT | Mod: ZL | Performed by: FAMILY MEDICINE

## 2022-08-01 PROCEDURE — 90677 PCV20 VACCINE IM: CPT

## 2022-08-01 PROCEDURE — 99215 OFFICE O/P EST HI 40 MIN: CPT | Performed by: FAMILY MEDICINE

## 2022-08-01 PROCEDURE — 85045 AUTOMATED RETICULOCYTE COUNT: CPT | Mod: ZL | Performed by: FAMILY MEDICINE

## 2022-08-01 RX ORDER — HYDROCORTISONE 2.5 %
CREAM (GRAM) TOPICAL
COMMUNITY
Start: 2022-04-25

## 2022-08-01 RX ORDER — LISINOPRIL 20 MG/1
20 TABLET ORAL DAILY
Qty: 90 TABLET | Refills: 0 | Status: SHIPPED | OUTPATIENT
Start: 2022-08-01 | End: 2022-11-03

## 2022-08-01 RX ORDER — PIMECROLIMUS 10 MG/G
CREAM TOPICAL
COMMUNITY
Start: 2022-04-26 | End: 2024-10-01

## 2022-08-01 ASSESSMENT — ENCOUNTER SYMPTOMS
CHILLS: 0
NERVOUS/ANXIOUS: 1
HEADACHES: 1
PALPITATIONS: 0
SHORTNESS OF BREATH: 0
SINUS PRESSURE: 1
SINUS PAIN: 1
FEVER: 0
ABDOMINAL PAIN: 0

## 2022-08-01 ASSESSMENT — PAIN SCALES - GENERAL: PAINLEVEL: NO PAIN (0)

## 2022-08-01 NOTE — NURSING NOTE
"Chief Complaint   Patient presents with     Hypertension     Establish Care       Initial BP (!) 153/98   Pulse 72   Temp 97.3  F (36.3  C) (Tympanic)   Resp 18   Wt 63 kg (139 lb)   SpO2 99%   BMI 21.13 kg/m   Estimated body mass index is 21.13 kg/m  as calculated from the following:    Height as of 10/29/21: 1.727 m (5' 8\").    Weight as of this encounter: 63 kg (139 lb).  Medication Reconciliation: complete  Lois Lemus LPN  "

## 2022-08-01 NOTE — PATIENT INSTRUCTIONS
"It was nice to meet you today.       Nasal Hygiene: [employer]    Nasal saline (salt water) 1 to 2 sprays 10 times per day  Nasal saline sinus rinses 1 to 2 time per day (over the counter packets or top water/salt packets)  Over the counter nasal gels/ oil 2 to 3 times per day  Increase oral hydration (carry a water bottle with you)  Cool air humidifier at bedside at night  If instructed, use antibiotic ointment around each nostril 2 times per ay or at night (apply with finger or q-tip)  Avoid or minimize allergic / irritant triggers if possible       Please take your blood pressure and heart rate at least two hours after medications and MyChart the readings to be in two weeks.       We put in a referral to gynecology here    We will call you with your lab results.     We put in an order for bone density scan and mammogram    Check with your insurance or pharmacist about the new shingles vaccine (Shingrix) - if it is covered and you wish to return for it you can make a nurse only visit. Remember it is 2 shots, the first at time \"Zero\" and the second 2-6 months later.     "

## 2022-08-02 LAB
BASOPHILS # BLD AUTO: 0.1 10E3/UL (ref 0–0.2)
BASOPHILS NFR BLD AUTO: 1 %
EOSINOPHIL # BLD AUTO: 0 10E3/UL (ref 0–0.7)
EOSINOPHIL NFR BLD AUTO: 1 %
ERYTHROCYTE [DISTWIDTH] IN BLOOD BY AUTOMATED COUNT: 12.8 % (ref 10–15)
HCT VFR BLD AUTO: 40 % (ref 35–47)
HGB BLD-MCNC: 14.3 G/DL (ref 11.7–15.7)
IMM GRANULOCYTES # BLD: 0 10E3/UL
IMM GRANULOCYTES NFR BLD: 0 %
LYMPHOCYTES # BLD AUTO: 1.4 10E3/UL (ref 0.8–5.3)
LYMPHOCYTES NFR BLD AUTO: 28 %
MCH RBC QN AUTO: 37 PG (ref 26.5–33)
MCHC RBC AUTO-ENTMCNC: 35.8 G/DL (ref 31.5–36.5)
MCV RBC AUTO: 103 FL (ref 78–100)
MONOCYTES # BLD AUTO: 0.8 10E3/UL (ref 0–1.3)
MONOCYTES NFR BLD AUTO: 16 %
NEUTROPHILS # BLD AUTO: 2.9 10E3/UL (ref 1.6–8.3)
NEUTROPHILS NFR BLD AUTO: 55 %
PATH REPORT.COMMENTS IMP SPEC: NORMAL
PATH REPORT.COMMENTS IMP SPEC: NORMAL
PATH REPORT.FINAL DX SPEC: NORMAL
PATH REPORT.MICROSCOPIC SPEC OTHER STN: NORMAL
PATH REPORT.MICROSCOPIC SPEC OTHER STN: NORMAL
PLATELET # BLD AUTO: 198 10E3/UL (ref 150–450)
RBC # BLD AUTO: 3.87 10E6/UL (ref 3.8–5.2)
WBC # BLD AUTO: 5.2 10E3/UL (ref 4–11)

## 2022-08-02 PROCEDURE — 85060 BLOOD SMEAR INTERPRETATION: CPT | Performed by: PATHOLOGY

## 2022-08-04 ENCOUNTER — MYC MEDICAL ADVICE (OUTPATIENT)
Dept: FAMILY MEDICINE | Facility: OTHER | Age: 67
End: 2022-08-04

## 2022-08-04 NOTE — TELEPHONE ENCOUNTER
Pt returned call to HC triage. Writer reviewed Providers orders relayed by Nurse Abreu regarding lisinopril & metoprolol.  Writer reviewed Providers orders from labs 8/1/22.  Pt relayed understanding of Providers orders.  No further concerns at the end of this call.

## 2022-08-10 ENCOUNTER — TELEPHONE (OUTPATIENT)
Dept: OBGYN | Facility: OTHER | Age: 67
End: 2022-08-10

## 2022-08-10 NOTE — TELEPHONE ENCOUNTER
Patient was called 3 times by nurse and voice messages left. 8/10/22-letter sent to patient to schedule appt from referral.      Patience Tamayo

## 2022-09-04 NOTE — TELEPHONE ENCOUNTER
12:06 PM    Reason for Call: Phone Call    Description: Pt called and stated she has an appt today with Dermatology at [facility] at 1:30. She needs to get a new referral for this. Please call her back at [phone]    Was an appointment offered for this call? No  If yes : Appointment type              Date    Preferred method for responding to this message: Telephone Call  What is your phone number ?    If we cannot reach you directly, may we leave a detailed response at the number you provided? Yes    Can this message wait until your PCP/provider returns, if available today? Not applicable    Rocio Kohler    
Patient notified via phone.        
Referral signed, please let patient know  
04-Sep-2022

## 2022-09-29 ENCOUNTER — MEDICAL CORRESPONDENCE (OUTPATIENT)
Dept: INTERVENTIONAL RADIOLOGY/VASCULAR | Facility: HOSPITAL | Age: 67
End: 2022-09-29

## 2022-10-03 DIAGNOSIS — I10 ESSENTIAL HYPERTENSION: ICD-10-CM

## 2022-10-03 RX ORDER — METOPROLOL SUCCINATE 50 MG/1
50 TABLET, EXTENDED RELEASE ORAL DAILY
Qty: 90 TABLET | Refills: 3 | Status: SHIPPED | OUTPATIENT
Start: 2022-10-03 | End: 2023-09-05

## 2022-10-03 NOTE — TELEPHONE ENCOUNTER
Metoprolol       Last Written Prescription Date:  5/12/21  Last Fill Quantity: 90,   # refills: 3  Last Office Visit: 8/1/22  Future Office visit:    Next 5 appointments (look out 90 days)    Nov 01, 2022 11:30 AM  (Arrive by 11:15 AM)  SHORT with Kassandra Prado MD  [facility] [place] ([facility] [place] ) [address]  [place] [address]  [phone]

## 2022-10-31 ENCOUNTER — TELEPHONE (OUTPATIENT)
Dept: INTERVENTIONAL RADIOLOGY/VASCULAR | Facility: HOSPITAL | Age: 67
End: 2022-10-31

## 2022-10-31 NOTE — TELEPHONE ENCOUNTER
Left a message to remind patient of her appt on 11/2. Also reminded patient she needs a  and to not take any antibiotics, steroids, and immunizations two weeks before and after this appt.    Cynthia Leong

## 2022-11-01 ENCOUNTER — HOSPITAL ENCOUNTER (OUTPATIENT)
Dept: ULTRASOUND IMAGING | Facility: HOSPITAL | Age: 67
Discharge: HOME | End: 2022-11-01
Attending: FAMILY MEDICINE
Payer: MEDICARE

## 2022-11-01 ENCOUNTER — ANCILLARY PROCEDURE (OUTPATIENT)
Dept: MAMMOGRAPHY | Facility: OTHER | Age: 67
End: 2022-11-01
Attending: FAMILY MEDICINE
Payer: MEDICARE

## 2022-11-01 ENCOUNTER — TELEPHONE (OUTPATIENT)
Dept: MAMMOGRAPHY | Facility: OTHER | Age: 67
End: 2022-11-01

## 2022-11-01 ENCOUNTER — HOSPITAL ENCOUNTER (OUTPATIENT)
Dept: BONE DENSITY | Facility: HOSPITAL | Age: 67
Discharge: HOME | End: 2022-11-01
Attending: FAMILY MEDICINE
Payer: MEDICARE

## 2022-11-01 DIAGNOSIS — R74.01 TRANSAMINITIS: ICD-10-CM

## 2022-11-01 DIAGNOSIS — K76.0 FATTY LIVER: ICD-10-CM

## 2022-11-01 DIAGNOSIS — Z12.31 ENCOUNTER FOR SCREENING MAMMOGRAM FOR BREAST CANCER: ICD-10-CM

## 2022-11-01 DIAGNOSIS — R74.01 TRANSAMINITIS: Primary | ICD-10-CM

## 2022-11-01 DIAGNOSIS — Z78.0 POST-MENOPAUSAL: ICD-10-CM

## 2022-11-01 PROCEDURE — 77067 SCR MAMMO BI INCL CAD: CPT | Mod: TC

## 2022-11-01 PROCEDURE — 76705 ECHO EXAM OF ABDOMEN: CPT

## 2022-11-01 PROCEDURE — 77080 DXA BONE DENSITY AXIAL: CPT

## 2022-11-02 ENCOUNTER — HOSPITAL ENCOUNTER (OUTPATIENT)
Facility: HOSPITAL | Age: 67
Discharge: HOME | End: 2022-11-02
Attending: FAMILY MEDICINE | Admitting: RADIOLOGY
Payer: MEDICARE

## 2022-11-02 ENCOUNTER — HOSPITAL ENCOUNTER (OUTPATIENT)
Dept: INTERVENTIONAL RADIOLOGY/VASCULAR | Facility: HOSPITAL | Age: 67
Discharge: HOME | End: 2022-11-02
Attending: ORTHOPAEDIC SURGERY | Admitting: RADIOLOGY
Payer: MEDICARE

## 2022-11-02 DIAGNOSIS — M16.12 ARTHRITIS OF LEFT HIP: ICD-10-CM

## 2022-11-02 DIAGNOSIS — M25.552 LEFT HIP PAIN: ICD-10-CM

## 2022-11-02 PROCEDURE — 255N000002 HC RX 255 OP 636: Performed by: RADIOLOGY

## 2022-11-02 PROCEDURE — 77002 NEEDLE LOCALIZATION BY XRAY: CPT

## 2022-11-02 PROCEDURE — 250N000011 HC RX IP 250 OP 636: Performed by: RADIOLOGY

## 2022-11-02 PROCEDURE — 250N000009 HC RX 250: Performed by: RADIOLOGY

## 2022-11-02 RX ORDER — LIDOCAINE HYDROCHLORIDE 10 MG/ML
10 INJECTION, SOLUTION EPIDURAL; INFILTRATION; INTRACAUDAL; PERINEURAL ONCE
Status: COMPLETED | OUTPATIENT
Start: 2022-11-02 | End: 2022-11-02

## 2022-11-02 RX ORDER — TRIAMCINOLONE ACETONIDE 40 MG/ML
40 INJECTION, SUSPENSION INTRA-ARTICULAR; INTRAMUSCULAR ONCE
Status: COMPLETED | OUTPATIENT
Start: 2022-11-02 | End: 2022-11-02

## 2022-11-02 RX ORDER — IOPAMIDOL 612 MG/ML
50 INJECTION, SOLUTION INTRAVASCULAR ONCE
Status: COMPLETED | OUTPATIENT
Start: 2022-11-02 | End: 2022-11-02

## 2022-11-02 RX ADMIN — LIDOCAINE HYDROCHLORIDE 6 ML: 10 INJECTION, SOLUTION EPIDURAL; INFILTRATION; INTRACAUDAL; PERINEURAL at 11:11

## 2022-11-02 RX ADMIN — TRIAMCINOLONE ACETONIDE 40 MG: 40 INJECTION, SUSPENSION INTRA-ARTICULAR; INTRAMUSCULAR at 11:11

## 2022-11-02 RX ADMIN — IOPAMIDOL 4 ML: 612 INJECTION, SOLUTION INTRAVENOUS at 11:12

## 2022-11-02 ASSESSMENT — ACTIVITIES OF DAILY LIVING (ADL): ADLS_ACUITY_SCORE: 35

## 2022-11-02 NOTE — DISCHARGE INSTRUCTIONS
Cell number on file:    Telephone Information:   Mobile [phone]     Is it ok to leave a message at this number(s)? Yes    Dr Kee completed your procedure on 11/2/2022.    Current Pain Level (0-10 Scale): 9/10  Post Pain Level (0-10):  0/10    Radiology Discharge instructions for Steroid Injection    Activity Level:     Do not do any heavy activity or exercise for 24 hours.   Do not drive for 4 hours after your injection.  Diet:   Return to your normal diet.  Medications:   If you have stopped taking your Aspirin, Coumadin/Warfarin, Ibuprofen, or any   other blood thinner for this procedure you may resume in the morning unless   your primary care provider has given you other instructions.    Diabetics may see an increase in blood sugar after steroid injections. If you are concerned about your blood sugar, please contact your family doctor.    Site Care:  Remove the bandage and bathe or shower the morning after the procedure.      Please allow two weeks to experience improvement in your pain.  If you have any further issues, please contact your provider.    Call your Primary Care Provider if you have the following (if your primary care provider is not available please seek emergency care):   Nausea with vomiting   Severe headache   Drowsiness or confusion   Redness or drainage at the injection or puncture site   Temperature over 101 degrees F   Other concerns   Worsening back pain   Stiff neck

## 2022-11-19 ENCOUNTER — HOSPITAL ENCOUNTER (EMERGENCY)
Facility: HOSPITAL | Age: 67
Discharge: HOME | End: 2022-11-19
Attending: NURSE PRACTITIONER | Admitting: NURSE PRACTITIONER
Payer: MEDICARE

## 2022-11-19 VITALS
SYSTOLIC BLOOD PRESSURE: 165 MMHG | OXYGEN SATURATION: 100 % | RESPIRATION RATE: 16 BRPM | DIASTOLIC BLOOD PRESSURE: 100 MMHG | HEART RATE: 81 BPM | TEMPERATURE: 98.5 F

## 2022-11-19 DIAGNOSIS — J32.9 SINUS INFECTION: ICD-10-CM

## 2022-11-19 DIAGNOSIS — J32.9 SINUSITIS, UNSPECIFIED CHRONICITY, UNSPECIFIED LOCATION: ICD-10-CM

## 2022-11-19 PROCEDURE — G0463 HOSPITAL OUTPT CLINIC VISIT: HCPCS

## 2022-11-19 PROCEDURE — 99213 OFFICE O/P EST LOW 20 MIN: CPT | Performed by: NURSE PRACTITIONER

## 2022-11-19 RX ORDER — CODEINE PHOSPHATE AND GUAIFENESIN 10; 100 MG/5ML; MG/5ML
1-2 SOLUTION ORAL EVERY 4 HOURS PRN
Qty: 118 ML | Refills: 0 | Status: SHIPPED | OUTPATIENT
Start: 2022-11-19 | End: 2023-08-24

## 2022-11-19 RX ORDER — DOXYCYCLINE 100 MG/1
100 CAPSULE ORAL 2 TIMES DAILY
Qty: 14 CAPSULE | Refills: 0 | Status: SHIPPED | OUTPATIENT
Start: 2022-11-19 | End: 2022-11-26

## 2022-11-19 ASSESSMENT — ENCOUNTER SYMPTOMS
HEADACHES: 1
FATIGUE: 1
VOICE CHANGE: 1
SORE THROAT: 0
SINUS PAIN: 1
NAUSEA: 0
LIGHT-HEADEDNESS: 0
DIZZINESS: 0
CHILLS: 0
MYALGIAS: 0
ACTIVITY CHANGE: 1
EYE REDNESS: 1
VOMITING: 0
SHORTNESS OF BREATH: 0
DIARRHEA: 0
COUGH: 1
RHINORRHEA: 1
SINUS PRESSURE: 1
FEVER: 0

## 2022-11-19 ASSESSMENT — ACTIVITIES OF DAILY LIVING (ADL): ADLS_ACUITY_SCORE: 33

## 2022-11-19 NOTE — DISCHARGE INSTRUCTIONS
Do not take milk products two hours before or after taking doxycycline.  Do not take multivitamins and avoid the sun when taking doxycycline.      Increase oral intake, cool mist vaporizer as needed, rest, avoid sharing utensils, practice good hand washing techniques, cover mouth when you cough and sneeze. Throw toothbursh away 24 hours after starting antibiotics.  Over the counter medications such as i acetaminophen for fever and generalized aches and pains.Tylenol 650 to 1000 mg every four to six hours as needed (not to exceed more than 4000 mg in a 24 hour period). May use interchangeably. Mucinex (guaifenesin) for cough. Chest rubs such as Damir's or Mentholatum may help reduce sore throat symptoms.  Chloraseptic spray for sore throat or menthol lozenges may be helpful for sore throat. Be reevaluated if symptoms persist longer than 10 - 14 days or worsen and if there is no improvement in 72 hours or worsening of symptoms.  Increase fluids. Complete all antibiotics even if feeling better. Taking antibiotics with food may decrease the stomach upset that can occur when taking antibiotics. Antibiotics frequently cause diarrhea. Probiotics or yogurt may help prevent or decrease these symptoms.    Loratadine (Claritin) or cetirizine (Zyrtec) 20 mg  daily for ten to fourteen days to see if symptoms lessen or resolve. If the medication seems to help you may take 10 mg daily on an ongoing basis.  May buy over the counter.    Fluids, herbs, and foods for sore throat relief -- Adjusting the temperature and texture of foods and beverages may provide local relief of sore throat pain. While data showing benefit are quite limited, these approaches are intuitive. We typically advise these measures since they are likely to be safe with minimal adverse effect, and patients often describe relief of symptoms.  We suggest hydration with frozen (eg, ice or popsicles) or heated liquids (eg, teas, soups), rather than room temperature  or refrigerated fluids in patient with significant sore throat pain. Very cold foods can have a numbing-like effect that temporarily reduces or alleviates the pain of swallowing. Ice cubes or frozen popsicles facilitate hydration; ice cream and frozen yogurt provide caloric intake.  Warm fluids and foods, including teas, soups, and soft non-irritating foods, may be better tolerated by patients with throat pain than irritating foods (eg, rough-textured or spicy foods) or fluids at room temperatures. Foods that coat the throat, including honey and hard candies, can facilitate intake of calories while temporarily relieving throat pain.

## 2022-11-19 NOTE — ED PROVIDER NOTES
History     Chief Complaint   Patient presents with     Sinusitis     HPI  Estela Schafer is a 67 year old female who presents with a 3-week history of fatigue, runny nose, sinus pain and pressure, voice change, left red eye, cough, and headache.  Has been taking DayQuil, NyQuil, and ibuprofen.  Last dose of NyQuil was last evening and did help her sleep.  Non-smoker.  No known sick contacts.  Had second COVID vaccination October, 2021.  Denies chills, fever, nausea, vomiting, diarrhea, and shortness of breath.    Allergies:  No Known Allergies    Problem List:    Patient Active Problem List    Diagnosis Date Noted     Fatty liver 11/01/2022     Priority: Medium     Anxiety 08/01/2022     Priority: Medium     Seborrheic dermatitis 10/01/2019     Priority: Medium     Personal history of malignant neoplasm of skin 12/29/2015     Priority: Medium     Overview:   BCC chest 2007       History of nonmelanoma skin cancer 12/29/2015     Priority: Medium     BCC chest 2007 [place]       Telangiectasia of limb 02/23/2014     Priority: Medium     Varicose veins of legs 04/05/2013     Priority: Medium     Actinic keratosis 02/12/2013     Priority: Medium     Advanced care planning/counseling discussion 11/01/2012     Priority: Medium     Esophageal reflux 05/26/2011     Priority: Medium     Basal cell carcinoma 04/26/2007     Priority: Medium     Righ chest wall. Dr Frey       Essential hypertension 03/28/2006     Priority: Medium     Problem list name updated by automated process. Provider to review       Sinusitis, chronic 02/25/2000     Priority: Medium     Problem list name updated by automated process. Provider to review          Past Medical History:    Past Medical History:   Diagnosis Date     Asymptomatic varicose veins 02/04/2013     Basal cell carcinoma 04/26/2007     Esophageal reflux 05/26/2011     Hypertension 03/28/2006     Unspecified sinusitis (chronic) 02/25/2000       Past Surgical History:    Past Surgical  History:   Procedure Laterality Date     Arthroscopy right meniscal tear  01/01/2006    Dr Orozco     Arthroscopy right shoulder  01/01/2010     COLONOSCOPY  01/01/2008    Family history of colon cancer (mother). Negative. Repeat 2013     COLONOSCOPY - HIM SCAN N/A 05/17/2016    Colonoscopy diagnostic;  Surgeon:  Gonsalo Prasad MD;  Location:  [facility] [address]     DILATION AND CURETTAGE       Removed mass left neck  01/01/2007    Dr Cervantes     Septoplasty and sinus surgery  01/01/2001     [place] teeth removed         Family History:    Family History   Problem Relation Age of Onset     Cancer Mother         colon     Cancer - colorectal Sister      Hypertension No family hx of      Cerebrovascular Disease No family hx of        Social History:  Marital Status:   [2]  Social History     Tobacco Use     Smoking status: Never     Smokeless tobacco: Never   Substance Use Topics     Alcohol use: Yes     Comment: occasionally     Drug use: No        Medications:    doxycycline hyclate (VIBRAMYCIN) 100 MG capsule  guaiFENesin-codeine (ROBITUSSIN AC) 100-10 MG/5ML solution  clonazePAM (KLONOPIN) 0.5 MG tablet  estrogen conj (PREMARIN) 0.625 MG tablet  hydrocortisone 2.5 % cream  ibuprofen (ADVIL/MOTRIN) 600 MG tablet  lisinopril (ZESTRIL) 20 MG tablet  medroxyPROGESTERone (PROVERA) 10 MG tablet  metoprolol succinate ER (TOPROL XL) 50 MG 24 hr tablet  metroNIDAZOLE (METROGEL) 0.75 % gel  mometasone (NASONEX) 50 MCG/ACT spray  pimecrolimus (ELIDEL) 1 % external cream  tretinoin (RETIN-A) 0.025 % cream          Review of Systems   Constitutional: Positive for activity change and fatigue. Negative for chills and fever.   HENT: Positive for rhinorrhea, sinus pressure, sinus pain and voice change. Negative for ear pain and sore throat.    Eyes: Positive for redness (left eye r/t contact).   Respiratory: Positive for cough. Negative for shortness of breath.    Gastrointestinal: Negative for diarrhea, nausea and  vomiting.   Genitourinary: Negative.    Musculoskeletal: Negative for myalgias.   Skin: Negative.    Neurological: Positive for headaches. Negative for dizziness and light-headedness.       Physical Exam   BP: (!) 185/107  Pulse: 81  Temp: 98.5  F (36.9  C)  Resp: 16  SpO2: 100 %      Physical Exam  Vitals and nursing note reviewed.   Constitutional:       General: She is not in acute distress.  HENT:      Head: Normocephalic.      Right Ear: Tympanic membrane and ear canal normal.      Left Ear: Tympanic membrane and ear canal normal.      Nose: Mucosal edema present.      Right Sinus: Maxillary sinus tenderness and frontal sinus tenderness present.      Left Sinus: Maxillary sinus tenderness and frontal sinus tenderness present.      Mouth/Throat:      Lips: Pink.      Mouth: Mucous membranes are moist.      Pharynx: Uvula midline. No posterior oropharyngeal erythema.      Comments: Moderate to large amount of translucent post nasal drip noted posterior oropharynx    Eyes:      Conjunctiva/sclera: Conjunctivae normal.   Cardiovascular:      Rate and Rhythm: Normal rate and regular rhythm.      Heart sounds: Normal heart sounds. No murmur heard.  Pulmonary:      Effort: Pulmonary effort is normal. No respiratory distress.      Breath sounds: Normal breath sounds. No wheezing.   Lymphadenopathy:      Cervical: Cervical adenopathy (small) present.      Right cervical: Superficial cervical adenopathy present.      Left cervical: Superficial cervical adenopathy present.   Skin:     General: Skin is warm and dry.   Neurological:      Mental Status: She is alert and oriented to person, place, and time.   Psychiatric:         Behavior: Behavior normal.         ED Course                 Procedures           No results found for this or any previous visit (from the past 24 hour(s)).    Medications - No data to display    Assessments & Plan (with Medical Decision Making)     I have reviewed the nursing notes.    I have  reviewed the findings, diagnosis, plan and need for follow up with the patient.  (J32.9) Sinus infection  Comment: 67 year old female who presents with a 3-week history of fatigue, runny nose, sinus pain and pressure, voice change, left red eye, cough, and headache.  Has been taking DayQuil, NyQuil, and ibuprofen.  Last dose of NyQuil was last evening and did help her sleep.  Non-smoker.  No known sick contacts.  Had second COVID vaccination October, 2021.  Denies chills, fever, nausea, vomiting, diarrhea, and shortness of breath.    MDM: NHT. Lungs CTA    Plan: Doxycycline twice daily for 7 days and Robitussin-AC for cough as needed as ordered.  Education provided and/or discussed for this/these medications and for sinus infection.  Do not take milk products two hours before or after taking doxycycline.  Do not take multivitamins and avoid the sun when taking doxycycline.   Treat symptoms conservatively with acetaminophen and  ibuprofen (if applicable) for fevers, body aches, and headaches, guaifenesin and/or honey for cough. May use chest rubs for sore throat and congestion, hot and cold liquids may help decrease sore throat and help you feel better. Increase fluids.   Loratadine (Claritin) or cetirizine (Zyrtec) 20 mg  daily for ten to fourteen days to see if symptoms lessen or resolve. If the medication seems to help you may take 10 mg daily on an ongoing basis.  May buy over the counter.     Return to be reevaluated by ER/UC or your primary care provider if symptoms worsen, you develop breathing difficulties, or you do not improve in a reasonable time frame. It can take several days for a cough to resolve. It can take ten to fourteen days for upper respiratory symptoms to resolve.   These discharge instructions and medications were reviewed with her and understanding verbalized.    This document was prepared using a combination of typing and voice generated software.  While every attempt was made for accuracy,  spelling and grammatical errors may exist.    Discharge Medication List as of 11/19/2022  1:20 PM      START taking these medications    Details   doxycycline hyclate (VIBRAMYCIN) 100 MG capsule Take 1 capsule (100 mg) by mouth 2 times daily for 7 days, Disp-14 capsule, R-0, E-Prescribe      guaiFENesin-codeine (ROBITUSSIN AC) 100-10 MG/5ML solution Take 5-10 mLs by mouth every 4 hours as needed for cough, Disp-118 mL, R-0, E-Prescribe             Final diagnoses:   Sinus infection       11/19/2022   [facility]       Ondina Castro, CNP  11/19/22 [id number]

## 2022-12-19 NOTE — PROGRESS NOTES
Assessment & Plan     Essential hypertension  BP above goal. Issues with lisinopril with cough. Cr wnl  - discontinue lisinopril   - start losartan (COZAAR) 50 MG tablet; Take 1 tablet (50 mg) by mouth daily  - home blood pressures in two weeks, if elevated increase losartan   - recheck in clinic in one month   - c/w metoprolol succinate     Transaminitis  Mild   US with fatty liver  Continue to [facility]    Chronic maxillary sinusitis  Due to duration of symptoms will trial additional round of abx. Encouraged saline nasal rinses   - amoxicillin-clavulanate (AUGMENTIN) 875-125 MG tablet; Take 1 tablet by mouth 2 times daily for 10 days  - if no improvements CT sinuses and ENT referral     See Patient Instructions    Return in about 4 weeks (around 1/17/2023) for BP Recheck.    Kassandra Prado MD  [facility] - BHAVNA Palmer is a 67 year old, presenting for the following health issues:  Hypertension      HPI     Hypertension Follow-up      Do you check your blood pressure regularly outside of the clinic? Yes     Are you following a low salt diet? No    Are your blood pressures ever more than 140 on the top number (systolic) OR more   than 90 on the bottom number (diastolic), for example 140/90? Yes    - discontinued hydrochlorothiazide (8/1/2022) d/t hyponatremia / hypokalemia  - BP at home 127/80     Has been coughing after taking her Lisinopril at night.    # Transaminitis  - improving     # sinus issues   - abx helped, but not resolved on left side  - has been doing flonase / claritin  - duration of 6 weeks  - feels like something is stuck       Review of Systems   Constitutional: Negative for chills and fever.   HENT: Positive for postnasal drip, sinus pressure and sinus pain. Negative for congestion.    Respiratory: Negative for shortness of breath.    Cardiovascular: Negative for chest pain and palpitations.   Gastrointestinal: Negative for abdominal pain.          Objective     Pulse 77   Temp 98  F (36.7  C) (Tympanic)   Resp 17   Wt 61.2 kg (135 lb)   SpO2 99%   BMI 20.53 kg/m    Body mass index is 20.53 kg/m .  Physical Exam  Constitutional:       General: She is not in acute distress.     Appearance: She is not ill-appearing.   HENT:      Right Ear: Tympanic membrane normal.      Left Ear: Tympanic membrane normal.      Nose: No congestion or rhinorrhea.      Right Sinus: Maxillary sinus tenderness and frontal sinus tenderness present.      Left Sinus: Maxillary sinus tenderness and frontal sinus tenderness present.      Comments: With L>R     Mouth/Throat:      Pharynx: No oropharyngeal exudate or posterior oropharyngeal erythema.   Eyes:      Extraocular Movements: Extraocular movements intact.      Conjunctiva/sclera: Conjunctivae normal.   Cardiovascular:      Rate and Rhythm: Normal rate and regular rhythm.      Heart sounds: No murmur heard.  Pulmonary:      Effort: Pulmonary effort is normal. No respiratory distress.      Breath sounds: No wheezing or rales.   Abdominal:      General: Bowel sounds are normal.      Tenderness: There is no abdominal tenderness.   Neurological:      Mental Status: She is alert.          Results for orders placed or performed in visit on 12/20/22 (from the past 24 hour(s))   Comprehensive metabolic panel (BMP + Alb, Alk Phos, ALT, AST, Total. Bili, TP)   Result Value Ref Range    Sodium 136 136 - 145 mmol/L    Potassium 3.7 3.4 - 5.3 mmol/L    Chloride 99 98 - 107 mmol/L    Carbon Dioxide (CO2) 24 22 - 29 mmol/L    Anion Gap 13 7 - 15 mmol/L    Urea Nitrogen 7.7 (L) 8.0 - 23.0 mg/dL    Creatinine 0.50 (L) 0.51 - 0.95 mg/dL    Calcium 8.9 8.8 - 10.2 mg/dL    Glucose 112 (H) 70 - 99 mg/dL    Alkaline Phosphatase 104 35 - 104 U/L    AST 39 (H) 10 - 35 U/L    ALT 19 10 - 35 U/L    Protein Total 7.4 6.4 - 8.3 g/dL    Albumin 4.1 3.5 - 5.2 g/dL    Bilirubin Total 0.9 <=1.2 mg/dL    GFR Estimate >90 >60 mL/min/1.73m2

## 2022-12-20 ENCOUNTER — LAB (OUTPATIENT)
Dept: LAB | Facility: OTHER | Age: 67
End: 2022-12-20
Payer: MEDICARE

## 2022-12-20 ENCOUNTER — OFFICE VISIT (OUTPATIENT)
Dept: FAMILY MEDICINE | Facility: OTHER | Age: 67
End: 2022-12-20
Attending: FAMILY MEDICINE
Payer: MEDICARE

## 2022-12-20 VITALS
RESPIRATION RATE: 17 BRPM | SYSTOLIC BLOOD PRESSURE: 164 MMHG | OXYGEN SATURATION: 99 % | BODY MASS INDEX: 20.53 KG/M2 | HEART RATE: 77 BPM | TEMPERATURE: 98 F | DIASTOLIC BLOOD PRESSURE: 97 MMHG | WEIGHT: 135 LBS

## 2022-12-20 DIAGNOSIS — E87.6 HYPOKALEMIA: ICD-10-CM

## 2022-12-20 DIAGNOSIS — I10 ESSENTIAL HYPERTENSION: Primary | ICD-10-CM

## 2022-12-20 DIAGNOSIS — J32.0 CHRONIC MAXILLARY SINUSITIS: ICD-10-CM

## 2022-12-20 DIAGNOSIS — R74.01 TRANSAMINITIS: ICD-10-CM

## 2022-12-20 DIAGNOSIS — I10 ESSENTIAL HYPERTENSION: ICD-10-CM

## 2022-12-20 DIAGNOSIS — E87.1 HYPONATREMIA: ICD-10-CM

## 2022-12-20 LAB
ALBUMIN SERPL BCG-MCNC: 4.1 G/DL (ref 3.5–5.2)
ALP SERPL-CCNC: 104 U/L (ref 35–104)
ALT SERPL W P-5'-P-CCNC: 19 U/L (ref 10–35)
ANION GAP SERPL CALCULATED.3IONS-SCNC: 13 MMOL/L (ref 7–15)
AST SERPL W P-5'-P-CCNC: 39 U/L (ref 10–35)
BILIRUB SERPL-MCNC: 0.9 MG/DL
BUN SERPL-MCNC: 7.7 MG/DL (ref 8–23)
CALCIUM SERPL-MCNC: 8.9 MG/DL (ref 8.8–10.2)
CHLORIDE SERPL-SCNC: 99 MMOL/L (ref 98–107)
CREAT SERPL-MCNC: 0.5 MG/DL (ref 0.51–0.95)
DEPRECATED HCO3 PLAS-SCNC: 24 MMOL/L (ref 22–29)
GFR SERPL CREATININE-BSD FRML MDRD: >90 ML/MIN/1.73M2
GLUCOSE SERPL-MCNC: 112 MG/DL (ref 70–99)
POTASSIUM SERPL-SCNC: 3.7 MMOL/L (ref 3.4–5.3)
PROT SERPL-MCNC: 7.4 G/DL (ref 6.4–8.3)
SODIUM SERPL-SCNC: 136 MMOL/L (ref 136–145)

## 2022-12-20 PROCEDURE — 36415 COLL VENOUS BLD VENIPUNCTURE: CPT | Mod: ZL

## 2022-12-20 PROCEDURE — 99214 OFFICE O/P EST MOD 30 MIN: CPT | Performed by: FAMILY MEDICINE

## 2022-12-20 PROCEDURE — G0463 HOSPITAL OUTPT CLINIC VISIT: HCPCS | Performed by: FAMILY MEDICINE

## 2022-12-20 PROCEDURE — 82040 ASSAY OF SERUM ALBUMIN: CPT | Mod: ZL

## 2022-12-20 PROCEDURE — 80053 COMPREHEN METABOLIC PANEL: CPT | Mod: ZL

## 2022-12-20 RX ORDER — LOSARTAN POTASSIUM 50 MG/1
50 TABLET ORAL DAILY
Qty: 90 TABLET | Refills: 1 | Status: SHIPPED | OUTPATIENT
Start: 2022-12-20 | End: 2023-06-28

## 2022-12-20 ASSESSMENT — ENCOUNTER SYMPTOMS
PALPITATIONS: 0
FEVER: 0
SINUS PRESSURE: 1
ABDOMINAL PAIN: 0
SHORTNESS OF BREATH: 0
SINUS PAIN: 1
CHILLS: 0

## 2022-12-20 ASSESSMENT — PAIN SCALES - GENERAL: PAINLEVEL: NO PAIN (0)

## 2022-12-20 NOTE — PATIENT INSTRUCTIONS
Stop lisinopril   Start losartan 50mg at bedtime   Home blood pressure and heart rate in two weeks via MyChart     Start augmentin for your sinusitis   Eat yogurt or probiotic     Nasal Hygiene: [facility]    Nasal saline (salt water) 1 to 2 sprays 10 times per day  Nasal saline sinus rinses 1 to 2 time per day (over the counter packets or top water/salt packets)  Over the counter nasal gels/ oil 2 to 3 times per day  Increase oral hydration (carry a water bottle with you)  Cool air humidifier at bedside at night  If instructed, use antibiotic ointment around each nostril 2 times per ay or at night (apply with finger or q-tip)  Avoid or minimize allergic / irritant triggers if possible

## 2023-01-06 DIAGNOSIS — R52 PAIN: ICD-10-CM

## 2023-01-09 RX ORDER — IBUPROFEN 600 MG/1
TABLET, FILM COATED ORAL
Qty: 60 TABLET | Refills: 0 | Status: SHIPPED | OUTPATIENT
Start: 2023-01-09 | End: 2023-01-31

## 2023-01-09 NOTE — TELEPHONE ENCOUNTER
Ibuprofen       Last Written Prescription Date:  4/28/22  Last Fill Quantity: 60,   # refills: 1  Last Office Visit: 12/20/22  Future Office visit:    Next 5 appointments (look out 90 days)    Jan 24, 2023  9:30 AM  (Arrive by 9:15 AM)  SHORT with Kassandra Prado MD  [facility] [place] ([facility] [place] ) [address]  [place] [address]  [phone]

## 2023-05-22 ENCOUNTER — TRANSFERRED RECORDS (OUTPATIENT)
Dept: HEALTH INFORMATION MANAGEMENT | Facility: CLINIC | Age: 68
End: 2023-05-22

## 2023-05-23 ENCOUNTER — HOSPITAL ENCOUNTER (OUTPATIENT)
Facility: HOSPITAL | Age: 68
End: 2023-05-23
Attending: RADIOLOGY | Admitting: RADIOLOGY
Payer: COMMERCIAL

## 2023-05-23 ENCOUNTER — MEDICAL CORRESPONDENCE (OUTPATIENT)
Dept: HEALTH INFORMATION MANAGEMENT | Facility: HOSPITAL | Age: 68
End: 2023-05-23

## 2023-07-06 ENCOUNTER — MYC MEDICAL ADVICE (OUTPATIENT)
Dept: FAMILY MEDICINE | Facility: OTHER | Age: 68
End: 2023-07-06

## 2023-07-06 DIAGNOSIS — F41.9 ANXIETY: ICD-10-CM

## 2023-07-06 RX ORDER — CLONAZEPAM 0.5 MG/1
TABLET ORAL
Qty: 60 TABLET | Refills: 0 | Status: SHIPPED | OUTPATIENT
Start: 2023-07-06 | End: 2024-04-05

## 2023-07-06 NOTE — TELEPHONE ENCOUNTER
clonazePAM 0.5 mg      Last Written Prescription Date:  03/19/2018  Last Fill Quantity: 60,   # refills: 0  Last Office Visit: 12/20/2022  Future Office visit:    Next 5 appointments (look out 90 days)    Aug 24, 2023  2:30 PM  (Arrive by 2:15 PM)  Pre-Op physical with Kassandra Prado MD  [facility] ([facility] [place] ) [address]  [place] [address]  [phone]           Routing refill request to provider for review/approval because:  Drug not on the [facility], [facility] or  Health refill protocol or controlled substance

## 2023-07-27 ENCOUNTER — MYC MEDICAL ADVICE (OUTPATIENT)
Dept: FAMILY MEDICINE | Facility: OTHER | Age: 68
End: 2023-07-27

## 2023-07-27 ENCOUNTER — NURSE TRIAGE (OUTPATIENT)
Dept: FAMILY MEDICINE | Facility: OTHER | Age: 68
End: 2023-07-27

## 2023-07-27 ENCOUNTER — HOSPITAL ENCOUNTER (EMERGENCY)
Facility: HOSPITAL | Age: 68
Discharge: HOME | End: 2023-07-27
Attending: PHYSICIAN ASSISTANT | Admitting: PHYSICIAN ASSISTANT
Payer: COMMERCIAL

## 2023-07-27 ENCOUNTER — APPOINTMENT (OUTPATIENT)
Dept: CT IMAGING | Facility: HOSPITAL | Age: 68
End: 2023-07-27
Attending: PHYSICIAN ASSISTANT
Payer: COMMERCIAL

## 2023-07-27 VITALS
RESPIRATION RATE: 16 BRPM | TEMPERATURE: 98.2 F | HEART RATE: 105 BPM | BODY MASS INDEX: 20.62 KG/M2 | WEIGHT: 135.58 LBS | SYSTOLIC BLOOD PRESSURE: 152 MMHG | DIASTOLIC BLOOD PRESSURE: 91 MMHG | OXYGEN SATURATION: 96 %

## 2023-07-27 DIAGNOSIS — I10 HYPERTENSION: ICD-10-CM

## 2023-07-27 LAB
ANION GAP SERPL CALCULATED.3IONS-SCNC: 13 MMOL/L (ref 7–15)
BASOPHILS # BLD AUTO: 0.1 10E3/UL (ref 0–0.2)
BASOPHILS NFR BLD AUTO: 1 %
BUN SERPL-MCNC: 8.3 MG/DL (ref 8–23)
CALCIUM SERPL-MCNC: 10 MG/DL (ref 8.8–10.2)
CHLORIDE SERPL-SCNC: 94 MMOL/L (ref 98–107)
CREAT SERPL-MCNC: 0.5 MG/DL (ref 0.51–0.95)
DEPRECATED HCO3 PLAS-SCNC: 24 MMOL/L (ref 22–29)
EOSINOPHIL # BLD AUTO: 0 10E3/UL (ref 0–0.7)
EOSINOPHIL NFR BLD AUTO: 1 %
ERYTHROCYTE [DISTWIDTH] IN BLOOD BY AUTOMATED COUNT: 12.4 % (ref 10–15)
GFR SERPL CREATININE-BSD FRML MDRD: >90 ML/MIN/1.73M2
GLUCOSE SERPL-MCNC: 109 MG/DL (ref 70–99)
HCT VFR BLD AUTO: 38.6 % (ref 35–47)
HGB BLD-MCNC: 13.7 G/DL (ref 11.7–15.7)
IMM GRANULOCYTES # BLD: 0 10E3/UL
IMM GRANULOCYTES NFR BLD: 0 %
LYMPHOCYTES # BLD AUTO: 1.6 10E3/UL (ref 0.8–5.3)
LYMPHOCYTES NFR BLD AUTO: 24 %
MCH RBC QN AUTO: 36.5 PG (ref 26.5–33)
MCHC RBC AUTO-ENTMCNC: 35.5 G/DL (ref 31.5–36.5)
MCV RBC AUTO: 103 FL (ref 78–100)
MONOCYTES # BLD AUTO: 0.9 10E3/UL (ref 0–1.3)
MONOCYTES NFR BLD AUTO: 14 %
NEUTROPHILS # BLD AUTO: 4 10E3/UL (ref 1.6–8.3)
NEUTROPHILS NFR BLD AUTO: 60 %
NRBC # BLD AUTO: 0 10E3/UL
NRBC BLD AUTO-RTO: 0 /100
PLATELET # BLD AUTO: 155 10E3/UL (ref 150–450)
POTASSIUM SERPL-SCNC: 4.1 MMOL/L (ref 3.4–5.3)
RBC # BLD AUTO: 3.75 10E6/UL (ref 3.8–5.2)
SODIUM SERPL-SCNC: 131 MMOL/L (ref 136–145)
WBC # BLD AUTO: 6.6 10E3/UL (ref 4–11)

## 2023-07-27 PROCEDURE — 36415 COLL VENOUS BLD VENIPUNCTURE: CPT | Performed by: PHYSICIAN ASSISTANT

## 2023-07-27 PROCEDURE — 96374 THER/PROPH/DIAG INJ IV PUSH: CPT

## 2023-07-27 PROCEDURE — 99285 EMERGENCY DEPT VISIT HI MDM: CPT | Mod: 25

## 2023-07-27 PROCEDURE — 80048 BASIC METABOLIC PNL TOTAL CA: CPT | Performed by: PHYSICIAN ASSISTANT

## 2023-07-27 PROCEDURE — 99284 EMERGENCY DEPT VISIT MOD MDM: CPT | Performed by: PHYSICIAN ASSISTANT

## 2023-07-27 PROCEDURE — 96375 TX/PRO/DX INJ NEW DRUG ADDON: CPT

## 2023-07-27 PROCEDURE — 85004 AUTOMATED DIFF WBC COUNT: CPT | Performed by: PHYSICIAN ASSISTANT

## 2023-07-27 PROCEDURE — 96361 HYDRATE IV INFUSION ADD-ON: CPT

## 2023-07-27 PROCEDURE — 258N000003 HC RX IP 258 OP 636: Performed by: PHYSICIAN ASSISTANT

## 2023-07-27 PROCEDURE — 70450 CT HEAD/BRAIN W/O DYE: CPT

## 2023-07-27 PROCEDURE — 250N000011 HC RX IP 250 OP 636: Mod: JZ | Performed by: PHYSICIAN ASSISTANT

## 2023-07-27 RX ORDER — LORAZEPAM 2 MG/ML
1 INJECTION INTRAMUSCULAR ONCE
Status: COMPLETED | OUTPATIENT
Start: 2023-07-27 | End: 2023-07-27

## 2023-07-27 RX ORDER — LABETALOL 20 MG/4 ML (5 MG/ML) INTRAVENOUS SYRINGE
20 ONCE
Status: COMPLETED | OUTPATIENT
Start: 2023-07-27 | End: 2023-07-27

## 2023-07-27 RX ORDER — HYDRALAZINE HYDROCHLORIDE 20 MG/ML
10 INJECTION INTRAMUSCULAR; INTRAVENOUS ONCE
Status: COMPLETED | OUTPATIENT
Start: 2023-07-27 | End: 2023-07-27

## 2023-07-27 RX ORDER — KETOROLAC TROMETHAMINE 15 MG/ML
15 INJECTION, SOLUTION INTRAMUSCULAR; INTRAVENOUS ONCE
Status: COMPLETED | OUTPATIENT
Start: 2023-07-27 | End: 2023-07-27

## 2023-07-27 RX ADMIN — SODIUM CHLORIDE 1000 ML: 9 INJECTION, SOLUTION INTRAVENOUS at 16:47

## 2023-07-27 RX ADMIN — HYDRALAZINE HYDROCHLORIDE 10 MG: 20 INJECTION INTRAMUSCULAR; INTRAVENOUS at 18:51

## 2023-07-27 RX ADMIN — KETOROLAC TROMETHAMINE 15 MG: 15 INJECTION, SOLUTION INTRAMUSCULAR; INTRAVENOUS at 18:58

## 2023-07-27 RX ADMIN — LORAZEPAM 1 MG: 2 INJECTION INTRAMUSCULAR; INTRAVENOUS at 18:02

## 2023-07-27 RX ADMIN — LABETALOL HYDROCHLORIDE 20 MG: 5 INJECTION, SOLUTION INTRAVENOUS at 16:48

## 2023-07-27 ASSESSMENT — ACTIVITIES OF DAILY LIVING (ADL)
ADLS_ACUITY_SCORE: 35
ADLS_ACUITY_SCORE: 35

## 2023-07-27 NOTE — ED NOTES
Patient presents ambulatory for elevated BPs at home. She called her PCP and they recommended she comes to the ER for evaluation.     Patient reports an intermittent headache but denies vision changes, paresthesias, abdominal pain, chest pain, shortness of breath, or GI/ changes. Denies fevers and chills. Denies recent illness.     She reports her headache was on the top aspect of her head but now reports pain in her frontal lobe.     BP while moving arm with BP cuff inflated 181/128

## 2023-07-27 NOTE — TELEPHONE ENCOUNTER
"Advised pt to go to the ER to be evaluated.    Please see MCM, see b/p below.    Please note.     Reason for Disposition   [1] Systolic BP  >= 160 OR Diastolic >= 100 AND [2] cardiac or neurologic symptoms (e.g., chest pain, difficulty breathing, unsteady gait, blurred vision)    Answer Assessment - Initial Assessment Questions  1. BLOOD PRESSURE: \"What is the blood pressure?\" \"Did you take at least two measurements 5 minutes apart?\"      168/107 at 2:00 PM  206/128  2. ONSET: \"When did you take your blood pressure?\"   Just took it and was 206/128  3. HOW: \"How did you obtain the blood pressure?\" (e.g., visiting nurse, automatic home BP monitor)      Automatic home b/p monitor.    4. HISTORY: \"Do you have a history of high blood pressure?\"      Yes 20+ years  5. MEDICATIONS: \"Are you taking any medications for blood pressure?\" \"Have you missed any doses recently?\"      Losartan and Metoprolol  Yes around the 24th and 25th of July   6. OTHER SYMPTOMS: \"Do you have any symptoms?\" (e.g., headache, chest pain, blurred vision, difficulty breathing, weakness)      Headache and weakness \"feels a little off\"  7. PREGNANCY: \"Is there any chance you are pregnant?\" \"When was your last menstrual period?\"      No    Protocols used: Blood Pressure - High-A-AH    "

## 2023-07-27 NOTE — ED PROVIDER NOTES
History     Chief Complaint   Patient presents with     Headache     Hypertension     HPI  Estela Schafer is a 68 year old female who comes to the ER for evaluation of blood pressure.  She was noted to be in the 200/110's.  She notes that she has a headache.  She states that she has had symptoms for today.  She has had some elevated blood pressures the last couple days about 3 days ago she had some low blood pressure so she did not take her blood pressure medication on Monday.  Patient otherwise has been having stable blood pressure previously.  No recent fevers or chills.  No nausea, vomiting or diarrhea.  No chest pain or shortness of breath no abdominal pain.  Denies any urinary symptoms.    Allergies:  Allergies   Allergen Reactions     Lisinopril Cough       Problem List:    Patient Active Problem List    Diagnosis Date Noted     Fatty liver 11/01/2022     Priority: Medium     Anxiety 08/01/2022     Priority: Medium     Seborrheic dermatitis 10/01/2019     Priority: Medium     Personal history of malignant neoplasm of skin 12/29/2015     Priority: Medium     Overview:   BCC chest 2007       History of nonmelanoma skin cancer 12/29/2015     Priority: Medium     BCC chest 2007 [facility]       Telangiectasia of limb 02/23/2014     Priority: Medium     Varicose veins of legs 04/05/2013     Priority: Medium     Actinic keratosis 02/12/2013     Priority: Medium     Advanced care planning/counseling discussion 11/01/2012     Priority: Medium     Esophageal reflux 05/26/2011     Priority: Medium     Basal cell carcinoma 04/26/2007     Priority: Medium     [place] chest wall. Dr Frey       Essential hypertension 03/28/2006     Priority: Medium     Problem list name updated by automated process. Provider to review       Sinusitis, chronic 02/25/2000     Priority: Medium     Problem list name updated by automated process. Provider to review          Past Medical History:    Past Medical History:   Diagnosis Date      Asymptomatic varicose veins 02/04/2013     Basal cell carcinoma 04/26/2007     Esophageal reflux 05/26/2011     Hypertension 03/28/2006     Unspecified sinusitis (chronic) 02/25/2000       Past Surgical History:    Past Surgical History:   Procedure Laterality Date     Arthroscopy right meniscal tear  01/01/2006    Dr Orozco     Arthroscopy right shoulder  01/01/2010     COLONOSCOPY  01/01/2008    Family history of colon cancer (mother). Negative. Repeat 2013     COLONOSCOPY - HIM SCAN N/A 05/17/2016    Colonoscopy diagnostic;  Surgeon:  Gonsalo Prasad MD;  Location:  [facility] [address]     DILATION AND CURETTAGE       Removed mass left neck  01/01/2007    Dr Cervantes     Septoplasty and sinus surgery  01/01/2001     [place] teeth removed         Family History:    Family History   Problem Relation Age of Onset     Cancer Mother         colon     Cancer - colorectal Sister      Hypertension No family hx of      Cerebrovascular Disease No family hx of        Social History:  Marital Status:   [2]  Social History     Tobacco Use     Smoking status: Never     Smokeless tobacco: Never   Substance Use Topics     Alcohol use: Yes     Comment: occasionally     Drug use: No        Medications:    losartan (COZAAR) 50 MG tablet  metoprolol succinate ER (TOPROL XL) 50 MG 24 hr tablet  clonazePAM (KLONOPIN) 0.5 MG tablet  estrogen conj (PREMARIN) 0.625 MG tablet  guaiFENesin-codeine (ROBITUSSIN AC) 100-10 MG/5ML solution  hydrocortisone 2.5 % cream  ibuprofen (ADVIL/MOTRIN) 600 MG tablet  medroxyPROGESTERone (PROVERA) 10 MG tablet  metroNIDAZOLE (METROGEL) 0.75 % gel  mometasone (NASONEX) 50 MCG/ACT spray  pimecrolimus (ELIDEL) 1 % external cream  tretinoin (RETIN-A) 0.025 % cream          Review of Systems   All other systems reviewed and are negative.      Physical Exam   BP: (!) 189/112  Pulse: 87  Temp: 98.2  F (36.8  C)  Resp: 18  Weight: 61.5 kg (135 lb 9.3 oz)  SpO2: 99 %      Physical  Exam  Constitutional:       General: She is not in acute distress.     Appearance: Normal appearance. She is normal weight. She is not ill-appearing, toxic-appearing or diaphoretic.   HENT:      Head: Normocephalic and atraumatic.      Right Ear: External ear normal.      Left Ear: External ear normal.   Eyes:      Extraocular Movements: Extraocular movements intact.      Conjunctiva/sclera: Conjunctivae normal.      Pupils: Pupils are equal, round, and reactive to light.   Cardiovascular:      Rate and Rhythm: Normal rate.   Pulmonary:      Effort: Pulmonary effort is normal. No respiratory distress.   Musculoskeletal:         General: Normal range of motion.   Skin:     General: Skin is warm and dry.      Coloration: Skin is not jaundiced or pale.   Neurological:      General: No focal deficit present.      Mental Status: She is alert and oriented to person, place, and time. Mental status is at baseline.      Cranial Nerves: No cranial nerve deficit.      Sensory: Sensation is intact.      Motor: Motor function is intact.      Coordination: Coordination is intact.      Gait: Gait is intact.   Psychiatric:         Mood and Affect: Mood normal.         ED Course        IV started normal saline given.  We will check a CBC and a BMP.  Patient was given 20 mg of labetalol.  Improved to 150/100, however after about 20 minutes and back up to 182/107.  Headache was unchanged.  We will try some hydralazine.  Had a discussion with her that she has an atypical headache in the setting of high blood pressure and her age we could check for CT scan to see if she has any evidence of bleeding versus mass effect.  Patient would like to proceed with that.  He is negative on personal review as well as radiology report.  Patient was given some IV Ativan as well as some hydralazine.  Blood pressure normalized on 140-80.  I discussed with her follow-up with primary care at this time.  I was a little worried that her high blood pressure  may reduce partly due to some early alcohol withdrawal if she is a heavy alcohol user she admits to 2 to 3 glasses of wine a day her significant other seems to suggest that its higher than this and her daughter suggested to 2 bottles at night.  Patient is unwilling to be completely honest with me and I cannot change that I did discuss with her that heavy alcohol use with some recent cessation can lead to her high blood pressure.  She understands this.  At this time she is going to monitor and follow-up with her primary care doctor tomorrow.     Procedures                Results for orders placed or performed during the hospital encounter of 07/27/23 (from the past 24 hour(s))   CBC with platelets differential    Narrative    The following orders were created for panel order CBC with platelets differential.  Procedure                               Abnormality         Status                     ---------                               -----------         ------                     CBC with platelets and d...[[id number]]  Abnormal            Final result                 Please view results for these tests on the individual orders.   Basic metabolic panel   Result Value Ref Range    Sodium 131 (L) 136 - 145 mmol/L    Potassium 4.1 3.4 - 5.3 mmol/L    Chloride 94 (L) 98 - 107 mmol/L    Carbon Dioxide (CO2) 24 22 - 29 mmol/L    Anion Gap 13 7 - 15 mmol/L    Urea Nitrogen 8.3 8.0 - 23.0 mg/dL    Creatinine 0.50 (L) 0.51 - 0.95 mg/dL    Calcium 10.0 8.8 - 10.2 mg/dL    Glucose 109 (H) 70 - 99 mg/dL    GFR Estimate >90 >60 mL/min/1.73m2   CBC with platelets and differential   Result Value Ref Range    WBC Count 6.6 4.0 - 11.0 10e3/uL    RBC Count 3.75 (L) 3.80 - 5.20 10e6/uL    Hemoglobin 13.7 11.7 - 15.7 g/dL    Hematocrit 38.6 35.0 - 47.0 %     (H) 78 - 100 fL    MCH 36.5 (H) 26.5 - 33.0 pg    MCHC 35.5 31.5 - 36.5 g/dL    RDW 12.4 10.0 - 15.0 %    Platelet Count 155 150 - 450 10e3/uL    % Neutrophils 60 %    %  Lymphocytes 24 %    % Monocytes 14 %    % Eosinophils 1 %    % Basophils 1 %    % Immature Granulocytes 0 %    NRBCs per 100 WBC 0 <1 /100    Absolute Neutrophils 4.0 1.6 - 8.3 10e3/uL    Absolute Lymphocytes 1.6 0.8 - 5.3 10e3/uL    Absolute Monocytes 0.9 0.0 - 1.3 10e3/uL    Absolute Eosinophils 0.0 0.0 - 0.7 10e3/uL    Absolute Basophils 0.1 0.0 - 0.2 10e3/uL    Absolute Immature Granulocytes 0.0 <=0.4 10e3/uL    Absolute NRBCs 0.0 10e3/uL   CT Head w/o Contrast    Narrative    EXAM: CT HEAD W/O CONTRAST, 7/27/2023 6:13 PM    HISTORY: headache atypical    COMPARISON: No relevant priors available for comparison    TECHNIQUE:   Imaging protocol: Multiplanar CT images of the head without  intravenous contrast.   Acquisition: This CT exam was performed using one or more the  following dose reduction techniques: automated exposure control,  adjustment of the mA and/or kV according to patient size, and/or  iterative reconstruction technique.    FINDINGS:  No intracranial hemorrhage, mass effect, or midline shift. The  ventricles are proportionate to the cerebral sulci. No acute loss of  gray-white matter differentiation.    No acute osseous abnormality. No paranasal sinus mucosal thickening.  Mastoid air cells are clear. The orbits are grossly unremarkable.       Impression    IMPRESSION:  No acute intracranial abnormality.    BETH BOWSER MD         SYSTEM ID:  [id number]       Medications   0.9% sodium chloride BOLUS (1,000 mLs Intravenous $New Bag 7/27/23 1647)   labetalol (NORMODYNE/TRANDATE) syringe 20 mg (20 mg Intravenous $Given 7/27/23 1648)   ketorolac (TORADOL) injection 15 mg (15 mg Intravenous $Given 7/27/23 1858)   hydrALAZINE (APRESOLINE) injection 10 mg (10 mg Intravenous $Given 7/27/23 1851)   LORazepam (ATIVAN) injection 1 mg (1 mg Intravenous $Given 7/27/23 1802)       Assessments & Plan (with Medical Decision Making)     I have reviewed the nursing notes.    I have reviewed the findings, diagnosis,  plan and need for follow up with the patient.        New Prescriptions    No medications on file       Final diagnoses:   Hypertension       7/27/2023   [facility]     Ryan Herrera PA-C  07/27/23 1948

## 2023-07-27 NOTE — ED TRIAGE NOTES
"Patient presents with c/o of high BP- 207/127. Patient reports headache and feeling fatigued that started a couple days ago. Patient denies any chest pain or shortness of breath. Patient takes metoprolol and losartan for HTN.             "JONES Fleming 8/5/19      "

## 2023-07-28 NOTE — DISCHARGE INSTRUCTIONS
Continue to evaluate your blood pressure.  We have got it down here right now.  We do not want to bring her blood pressure down too much as this can cause some medical complications.  Follow-up with your doctor tomorrow if your blood pressure continues to be elevated and guidance on starting a new blood pressure medication versus increasing your current dose.

## 2023-07-31 NOTE — PROGRESS NOTES
"  Assessment & Plan     Essential hypertension  - Comprehensive metabolic panel  - TSH with free T4 reflex  - Magnesium    Hyponatremia    Metoprolol XL 50mg every day.  Losartan 50mg once daily - instead of bid as she feels this caused her issues.  Add Amlodipine 5mg daily.  Follow-up Friday.  ER for new/worsening symptoms.      MARYJANE GÓMEZ DO  [facility] - [place]    Subjective   Estela is a 68 year old, presenting for the following health issues:  Hypertension        8/1/2023     1:53 PM   Additional Questions   Roomed by owen moy   Accompanied by self       HPI     Seen in ER 5 days ago for for HA and HTN.  Had been taking her meds, then had a low BP, so skipped dose, then BP went up and didn't go back down.    Negative CT head.  Labs showed mild HypoNa.    Says ER told her to start taking 2 of her Losartan 50mg in addition to her metoprolol XL 50mg.    She is concerned that doing this, her BP is fluctuating and she is having highs and lows.     Home logs sent for scanning - single reading 92/59, otherwise upper high or very high (max 199/131).    Currently has mild HA, not as bad as in ER.    ER note mentions concern for early alcohol withdrawal.  Apparently there was concern of heavier alcohol intake.  She adamantly denies this to me.      Review of Systems   Constitutional:  Negative for fever.   Eyes:  Negative for visual disturbance.   Respiratory:  Negative for shortness of breath.    Cardiovascular:  Negative for chest pain, palpitations and peripheral edema.   Neurological:  Negative for light-headedness.            Objective    BP (!) 180/106   Pulse 82   Temp 97.4  F (36.3  C) (Tympanic)   Resp 18   Ht 1.727 m (5' 8\")   Wt 61.2 kg (135 lb)   SpO2 98%   BMI 20.53 kg/m    Body mass index is 20.53 kg/m .  Physical Exam  Constitutional:       General: She is not in acute distress.     Appearance: Normal appearance.   Cardiovascular:      Rate and Rhythm: Normal rate and regular " rhythm.      Heart sounds: Normal heart sounds. No murmur heard.  Pulmonary:      Effort: Pulmonary effort is normal.      Breath sounds: Normal breath sounds. No wheezing, rhonchi or rales.   Musculoskeletal:      Right lower leg: No edema.      Left lower leg: No edema.   Neurological:      Mental Status: She is alert and oriented to person, place, and time.

## 2023-08-01 ENCOUNTER — OFFICE VISIT (OUTPATIENT)
Dept: FAMILY MEDICINE | Facility: OTHER | Age: 68
End: 2023-08-01
Attending: FAMILY MEDICINE
Payer: COMMERCIAL

## 2023-08-01 VITALS
BODY MASS INDEX: 20.46 KG/M2 | TEMPERATURE: 97.4 F | HEIGHT: 68 IN | RESPIRATION RATE: 18 BRPM | HEART RATE: 82 BPM | OXYGEN SATURATION: 98 % | SYSTOLIC BLOOD PRESSURE: 180 MMHG | DIASTOLIC BLOOD PRESSURE: 106 MMHG | WEIGHT: 135 LBS

## 2023-08-01 DIAGNOSIS — I10 ESSENTIAL HYPERTENSION: Primary | ICD-10-CM

## 2023-08-01 DIAGNOSIS — E87.1 HYPONATREMIA: ICD-10-CM

## 2023-08-01 LAB
ALBUMIN SERPL BCG-MCNC: 4.2 G/DL (ref 3.5–5.2)
ALP SERPL-CCNC: 131 U/L (ref 35–104)
ALT SERPL W P-5'-P-CCNC: 29 U/L (ref 0–50)
ANION GAP SERPL CALCULATED.3IONS-SCNC: 12 MMOL/L (ref 7–15)
AST SERPL W P-5'-P-CCNC: 35 U/L (ref 0–45)
BILIRUB SERPL-MCNC: 1.3 MG/DL
BUN SERPL-MCNC: 9.9 MG/DL (ref 8–23)
CALCIUM SERPL-MCNC: 10.3 MG/DL (ref 8.8–10.2)
CHLORIDE SERPL-SCNC: 98 MMOL/L (ref 98–107)
CREAT SERPL-MCNC: 0.63 MG/DL (ref 0.51–0.95)
DEPRECATED HCO3 PLAS-SCNC: 24 MMOL/L (ref 22–29)
GFR SERPL CREATININE-BSD FRML MDRD: >90 ML/MIN/1.73M2
GLUCOSE SERPL-MCNC: 117 MG/DL (ref 70–99)
MAGNESIUM SERPL-MCNC: 2 MG/DL (ref 1.7–2.3)
POTASSIUM SERPL-SCNC: 4.1 MMOL/L (ref 3.4–5.3)
PROT SERPL-MCNC: 7.6 G/DL (ref 6.4–8.3)
SODIUM SERPL-SCNC: 134 MMOL/L (ref 136–145)
TSH SERPL DL<=0.005 MIU/L-ACNC: 2.7 UIU/ML (ref 0.3–4.2)

## 2023-08-01 PROCEDURE — 80053 COMPREHEN METABOLIC PANEL: CPT | Mod: ZL | Performed by: FAMILY MEDICINE

## 2023-08-01 PROCEDURE — 36415 COLL VENOUS BLD VENIPUNCTURE: CPT | Mod: ZL | Performed by: FAMILY MEDICINE

## 2023-08-01 PROCEDURE — G0463 HOSPITAL OUTPT CLINIC VISIT: HCPCS | Performed by: FAMILY MEDICINE

## 2023-08-01 PROCEDURE — 83735 ASSAY OF MAGNESIUM: CPT | Mod: ZL | Performed by: FAMILY MEDICINE

## 2023-08-01 PROCEDURE — 84443 ASSAY THYROID STIM HORMONE: CPT | Mod: ZL | Performed by: FAMILY MEDICINE

## 2023-08-01 PROCEDURE — 99214 OFFICE O/P EST MOD 30 MIN: CPT | Performed by: FAMILY MEDICINE

## 2023-08-01 RX ORDER — AMLODIPINE BESYLATE 5 MG/1
5 TABLET ORAL DAILY
Qty: 30 TABLET | Refills: 0 | Status: SHIPPED | OUTPATIENT
Start: 2023-08-01 | End: 2023-08-04

## 2023-08-01 ASSESSMENT — PAIN SCALES - GENERAL: PAINLEVEL: NO PAIN (0)

## 2023-08-03 NOTE — PROGRESS NOTES
"  Assessment & Plan     Essential hypertension  Making progress.  Increasing to total 10mg daily of amlodipine. Follow-up with PCP in 2 weeks.  - amLODIPine (NORVASC) 5 MG tablet; Take 1 tablet (5 mg) by mouth 2 times daily        MARYJANE GÓMEZ,   [facility] - BHAVNA Palmer is a 68 year old, presenting for the following health issues:  Hypertension      HPI     See my note from 8/1/23.  BP coming down, also with home monitoring - log sent for scanning.    No edema, no amlodipine side effects.  She reports no longer has HA.    Comparing her BP machine to our cuff today, we discover hers is reading 20/7 higher than our manual reading.        Review of Systems   Constitutional:  Negative for fever.   Respiratory:  Negative for shortness of breath.    Cardiovascular:  Negative for chest pain and peripheral edema.   Neurological:  Negative for light-headedness and headaches.            Objective    BP (!) 158/94   Pulse 88   Temp 97.9  F (36.6  C) (Tympanic)   Resp 18   Ht 1.727 m (5' 8\")   Wt 62.1 kg (137 lb)   SpO2 99%   BMI 20.83 kg/m    Body mass index is 20.83 kg/m .  Physical Exam  Constitutional:       General: She is not in acute distress.     Appearance: Normal appearance.   Cardiovascular:      Rate and Rhythm: Normal rate and regular rhythm.      Heart sounds: Normal heart sounds. No murmur heard.  Pulmonary:      Effort: Pulmonary effort is normal.      Breath sounds: Normal breath sounds. No wheezing.   Musculoskeletal:      Right lower leg: No edema.      Left lower leg: No edema.   Neurological:      Mental Status: She is alert and oriented to person, place, and time.                              "

## 2023-08-04 ENCOUNTER — OFFICE VISIT (OUTPATIENT)
Dept: FAMILY MEDICINE | Facility: OTHER | Age: 68
End: 2023-08-04
Attending: FAMILY MEDICINE
Payer: COMMERCIAL

## 2023-08-04 VITALS
SYSTOLIC BLOOD PRESSURE: 158 MMHG | RESPIRATION RATE: 18 BRPM | WEIGHT: 137 LBS | HEART RATE: 88 BPM | HEIGHT: 68 IN | BODY MASS INDEX: 20.76 KG/M2 | DIASTOLIC BLOOD PRESSURE: 94 MMHG | TEMPERATURE: 97.9 F | OXYGEN SATURATION: 99 %

## 2023-08-04 DIAGNOSIS — I10 ESSENTIAL HYPERTENSION: ICD-10-CM

## 2023-08-04 PROCEDURE — G0463 HOSPITAL OUTPT CLINIC VISIT: HCPCS

## 2023-08-04 PROCEDURE — 99214 OFFICE O/P EST MOD 30 MIN: CPT | Performed by: FAMILY MEDICINE

## 2023-08-04 RX ORDER — AMLODIPINE BESYLATE 5 MG/1
5 TABLET ORAL 2 TIMES DAILY
Qty: 60 TABLET | Refills: 0 | Status: SHIPPED | OUTPATIENT
Start: 2023-08-04 | End: 2023-08-24

## 2023-08-04 ASSESSMENT — PAIN SCALES - GENERAL: PAINLEVEL: EXTREME PAIN (9)

## 2023-08-07 ASSESSMENT — ENCOUNTER SYMPTOMS
PALPITATIONS: 0
SHORTNESS OF BREATH: 0
FEVER: 0
LIGHT-HEADEDNESS: 0

## 2023-08-10 ASSESSMENT — ENCOUNTER SYMPTOMS
HEADACHES: 0
LIGHT-HEADEDNESS: 0
FEVER: 0
SHORTNESS OF BREATH: 0

## 2023-08-22 NOTE — PATIENT INSTRUCTIONS
Stop your premarin two weeks before your surgery     For informational purposes only. Not to replace the advice of your health care provider. Copyright   ,  [employer] [facility]. All rights reserved. Clinically reviewed by Shey Bryant MD. AMCAD [id number] - REV .  Preparing for Your Surgery  Getting started  A nurse will call you to review your health history and instructions. They will give you an arrival time based on your scheduled surgery time. Please be ready to share:  Your doctor's clinic name and phone number  Your medical, surgical, and anesthesia history  A list of allergies and sensitivities  A list of medicines, including herbal treatments and over-the-counter drugs  Whether the patient has a legal guardian (ask how to send us the papers in advance)  Please tell us if you're pregnant--or if there's any chance you might be pregnant. Some surgeries may injure a fetus (unborn baby), so they require a pregnancy test. Surgeries that are safe for a fetus don't always need a test, and you can choose whether to have one.   If you have a child who's having surgery, please ask for a copy of Preparing for Your Child's Surgery.    Preparing for surgery  Within 10 to 30 days of surgery: Have a pre-op exam (sometimes called an H&P, or History and Physical). This can be done at a clinic or pre-operative center.  If you're having a , you may not need this exam. Talk to your care team.  At your pre-op exam, talk to your care team about all medicines you take. If you need to stop any medicines before surgery, ask when to start taking them again.  We do this for your safety. Many medicines can make you bleed too much during surgery. Some change how well surgery (anesthesia) drugs work.  Call your insurance company to let them know you're having surgery. (If you don't have insurance, call [phone].)  Call your clinic if there's any change in your health. This includes signs of a cold or flu  (sore throat, runny nose, cough, rash, fever). It also includes a scrape or scratch near the surgery site.  If you have questions on the day of surgery, call your hospital or surgery center.  Eating and drinking guidelines  For your safety: Unless your surgeon tells you otherwise, follow the guidelines below.  Eat and drink as usual until 8 hours before you arrive for surgery. After that, no food or milk.  Drink clear liquids until 2 hours before you arrive. These are liquids you can see through, like water, Gatorade, and Propel Water. They also include plain black coffee and tea (no cream or milk), candy, and breath mints. You can spit out gum when you arrive.  If you drink alcohol: Stop drinking it the night before surgery.  If your care team tells you to take medicine on the morning of surgery, it's okay to take it with a sip of water.  Preventing infection  Shower or bathe the night before and morning of your surgery. Follow the instructions your clinic gave you. (If no instructions, use regular soap.)  Don't shave or clip hair near your surgery site. We'll remove the hair if needed.  Don't smoke or vape the morning of surgery. You may chew nicotine gum up to 2 hours before surgery. A nicotine patch is okay.  Note: Some surgeries require you to completely quit smoking and nicotine. Check with your surgeon.  Your care team will make every effort to keep you safe from infection. We will:  Clean our hands often with soap and water (or an alcohol-based hand rub).  Clean the skin at your surgery site with a special soap that kills germs.  Give you a special gown to keep you warm. (Cold raises the risk of infection.)  Wear special hair covers, masks, gowns and gloves during surgery.  Give antibiotic medicine, if prescribed. Not all surgeries need antibiotics.  What to bring on the day of surgery  Photo ID and insurance card  Copy of your health care directive, if you have one  Glasses and hearing aids (bring  cases)  You can't wear contacts during surgery  Inhaler and eye drops, if you use them (tell us about these when you arrive)  CPAP machine or breathing device, if you use them  A few personal items, if spending the night  If you have . . .  A pacemaker, ICD (cardiac defibrillator) or other implant: Bring the ID card.  An implanted stimulator: Bring the remote control.  A legal guardian: Bring a copy of the certified (court-stamped) guardianship papers.  Please remove any jewelry, including body piercings. Leave jewelry and other valuables at home.  If you're going home the day of surgery  You must have a responsible adult drive you home. They should stay with you overnight as well.  If you don't have someone to stay with you, and you aren't safe to go home alone, we may keep you overnight. Insurance often won't pay for this.  After surgery  If it's hard to control your pain or you need more pain medicine, please call your surgeon's office.  Questions?   If you have any questions for your care team, list them here: _________________________________________________________________________________________________________________________________________________________________________ ____________________________________ ____________________________________ ____________________________________

## 2023-08-22 NOTE — PROGRESS NOTES
[facility] [place]  [address]  [facility] [place] [address]  Phone: [phone]  Primary Provider: Charity Prado  Pre-op Performing Provider: CHARITY PRADO      PREOPERATIVE EVALUATION:  Today's date: 8/24/2023    Estela Schafer is a 68 year old female who presents for a preoperative evaluation.    Surgical Information:  Surgery/Procedure: Left DOMITILA  Surgery Location: [facility]  Surgeon: Dr. Kern  Surgery Date: 9/7/2023  Time of Surgery: TBD  Where patient plans to recover: At home with family  Fax number for surgical facility: [facility]    Assessment & Plan     The proposed surgical procedure is considered INTERMEDIATE risk.    Preop general physical exam  Lab stable. EKG NSR. But concern for alcohol use with AST>ALT, plt on the low side of normal. INR normal.   - EKG 12-lead complete w/read - (Clinic Performed)  - Reflex INR; Future  - Reflex INR    Hip pain, left  Reason for the procedure     Essential hypertension  Home blood pressures at goal. Estela is highly anxious coming into clinic and expect same response day of surgery.   With amlodipine, home SBP in the 90s  - Comprehensive metabolic panel (BMP + Alb, Alk Phos, ALT, AST, Total. Bili, TP); Future  - CBC with platelets; Future  - Comprehensive metabolic panel (BMP + Alb, Alk Phos, ALT, AST, Total. Bili, TP)  - CBC with platelets  - c/w losartan, metoprolol     Transaminitis / Alcohol use / Macrocytosis without anemia  Estela reports drinking 1 to 2 glasses of wine 4 to 5 nights per week. Labs may suggest otherwise  Fib 4 score of 3.67  - Vitamin B12  - US abd w/ doppler placed     Anxiety  On clonazepam     Hyponatremia  Resolved        Risks and Recommendations:  The patient has the following additional risks and recommendations for perioperative complications:  Social and Substance:    - Alcohol abuse and risk of withdrawal    Antiplatelet or Anticoagulation Medication Instructions:   - Patient is on no antiplatelet or  anticoagulation medications.    Additional Medication Instructions:  Patient is to take all scheduled medications on the day of surgery   - Estrogens: HOLD for 2 weeks prior to surgery due to high risk of VTE (Caprini Score >/= 10).     - ibuprofen (Advil, Motrin): HOLD 2 day before surgery.     RECOMMENDATION:  Estela is optimized to proceed with proposed procedure, without further diagnostic evaluation.  6}    Subjective       HPI related to upcoming procedure: Estela has been having issues with her left hip for three years. Pain is interfering with her life style. She will be undergoing left DOMITILA          8/24/2023     2:21 PM   Preop Questions   1. Have you ever had a heart attack or stroke? No   2. Have you ever had surgery on your heart or blood vessels, such as a stent placement, a coronary artery bypass, or surgery on an artery in your head, neck, heart, or legs? No   3. Do you have chest pain with activity? No   4. Do you have a history of  heart failure? No   5. Do you currently have a cold, bronchitis or symptoms of other infection? No   6. Do you have a cough, shortness of breath, or wheezing? No   7. Do you or anyone in your family have previous history of blood clots? No   8. Do you or does anyone in your family have a serious bleeding problem such as prolonged bleeding following surgeries or cuts? No   9. Have you ever had problems with anemia or been told to take iron pills? No   10. Have you had any abnormal blood loss such as black, tarry or bloody stools, or abnormal vaginal bleeding? No   11. Have you ever had a blood transfusion? No   12. Are you willing to have a blood transfusion if it is medically needed before, during, or after your surgery? Yes   13. Have you or any of your relatives ever had problems with anesthesia? No   14. Do you have sleep apnea, excessive snoring or daytime drowsiness? No   15. Do you have any artifical heart valves or other implanted medical devices like a pacemaker,  defibrillator, or continuous glucose monitor? No   16. Do you have artificial joints? No   17. Are you allergic to latex? No     Health Care Directive:  Patient does not have a Health Care Directive or Living Will: Discussed advance care planning with patient; information given to patient to review.    Preoperative Review of :   reviewed - controlled substances reflected in medication list.      Status of Chronic Conditions:  DEPRESSION / alcohol - Patient has a long history of Depression of moderate severity requiring medication for control with recent symptoms being stable..Current symptoms of depression include anxiety.     # alcohol use   - ER visit (7/27/2023) with hypertension d/t alcohol withdrawal. Indications of drinking ~ (2) bottle of wine per night  - Estela reports drinks 1 to 2 glasses per night, but not every night. 4 to 5 times per week   - discussed importance of accurately discussing alcohol use d/t upcoming surgery and risks of alcohol       HYPERTENSION - Patient has longstanding history of HTN , currently denies any symptoms referable to elevated blood pressure. Specifically denies chest pain, palpitations, dyspnea, orthopnea, PND or peripheral edema. Blood pressure readings have not been in normal range. Current medication regimen is as listed below. Patient denies any side effects of medication.     - stopped amlodipine 5mg bid d/t low blood pressure at 92/59  - home blood pressures are at goal. Machine is a cuff machine   - taking losartan and metoprolol succinate     BP Readings from Last 6 Encounters:   08/24/23 (!) 158/88   08/04/23 (!) 158/94   08/01/23 (!) 180/106   07/27/23 152/91   12/20/22 (!) 164/97   11/19/22 165/100       # Cardiovascular: able to walk up a flight of stairs w/o cardiac or pulmonary issues    # Pulmonary: never smoker    # Bleeding/Clotting risk:  no personal or family h/o bleeding or clotting issues    # Previous surgical history: no issues with  anesthesia      Review of Systems   Constitutional:  Negative for chills and fever.   HENT:  Negative for congestion.    Respiratory:  Negative for shortness of breath.    Cardiovascular:  Negative for chest pain and palpitations.   Gastrointestinal:  Negative for abdominal pain.   Genitourinary:  Negative for difficulty urinating.   Musculoskeletal:  Positive for arthralgias (left hip).   Psychiatric/Behavioral:  The patient is nervous/anxious.        Patient Active Problem List    Diagnosis Date Noted    Fatty liver 11/01/2022     Priority: Medium    Anxiety 08/01/2022     Priority: Medium    Seborrheic dermatitis 10/01/2019     Priority: Medium    Personal history of malignant neoplasm of skin 12/29/2015     Priority: Medium     Overview:   BCC chest 2007      History of nonmelanoma skin cancer 12/29/2015     Priority: Medium     BCC chest 2007 Aldara      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 Dr Frey      Essential hypertension 03/28/2006     Priority: Medium     Problem list name updated by automated process. Provider to review      Sinusitis, chronic 02/25/2000     Priority: Medium     Problem list name updated by automated process. Provider to review        Past Medical History:   Diagnosis Date    Asymptomatic varicose veins 02/04/2013    Basal cell carcinoma 04/26/2007    [place] chest wall. Dr Frey    Esophageal reflux 05/26/2011    Hypertension 03/28/2006    Unspecified sinusitis (chronic) 02/25/2000     Past Surgical History:   Procedure Laterality Date    Arthroscopy right meniscal tear  01/01/2006    Dr Orozco    Arthroscopy right shoulder  01/01/2010    COLONOSCOPY   01/01/2008    Family history of colon cancer (mother). Negative. Repeat 2013    COLONOSCOPY - HIM SCAN N/A 05/17/2016    Colonoscopy diagnostic;  Surgeon:  Gonsalo Prasad MD;  Location:  [facility] [place] [facility]    DILATION AND CURETTAGE      Removed mass left neck  01/01/2007    Dr Cervantes    Septoplasty and sinus surgery  01/01/2001    [place] teeth removed       Current Outpatient Medications   Medication Sig Dispense Refill    clonazePAM (KLONOPIN) 0.5 MG tablet TAKE 1/2 TO 1 TABLET BY MOUTH TWICE DAILY AS NEEDED FOR ANXIETY 60 tablet 0    estrogen conj (PREMARIN) 0.625 MG tablet Take  by mouth daily.      estrogen conj-medroxyPROGESTERone (PREMPRO) 0.45-1.5 MG tablet Take 1 tablet by mouth daily      hydrocortisone 2.5 % cream APPLY TOPICALLY TO AFFECTED EAR TWICE DAILY AS NEEDED FOR ITCHING      ibuprofen (ADVIL/MOTRIN) 600 MG tablet TAKE 1 TABLET BY MOUTH EVERY 8 HOURS AS NEEDED FOR PAIN 60 tablet 1    losartan (COZAAR) 50 MG tablet TAKE 1 TABLET BY MOUTH DAILY 90 tablet 3    metoprolol succinate ER (TOPROL XL) 50 MG 24 hr tablet Take 1 tablet (50 mg) by mouth daily 90 tablet 3    tretinoin (RETIN-A) 0.025 % cream APPLY TOPICALLY AT BEDTIME      mometasone (NASONEX) 50 MCG/ACT spray SPRAY 2 SPRAYS INTO EACH NOSTRIL DAILY AS NEEDED (Patient not taking: Reported on 8/24/2023) 17 g 11    pimecrolimus (ELIDEL) 1 % external cream APPLY TO AREA OF RASH ON THE FACE TWICE DAILY (Patient not taking: Reported on 8/24/2023)         Allergies   Allergen Reactions    Lisinopril Cough        Social History     Tobacco Use    Smoking status: Never     Passive exposure: Never    Smokeless tobacco: Never   Substance Use Topics    Alcohol use: Yes     Comment: occasionally     Family History   Problem Relation Age of Onset    Cancer Mother         colon    Cancer - colorectal Sister     Hypertension No family hx of     Cerebrovascular Disease No family hx of      History   Drug Use No         Objective     BP (!) 158/88 (BP  "Location: Left arm, Patient Position: Left side, Cuff Size: Adult Small)   Pulse 92   Temp 98  F (36.7  C) (Tympanic)   Ht 1.727 m (5' 8\")   Wt 60.8 kg (134 lb)   SpO2 99%   BMI 20.37 kg/m      Physical Exam  Constitutional:       General: She is not in acute distress.     Appearance: She is well-developed.   HENT:      Head: Normocephalic and atraumatic.      Right Ear: Hearing and tympanic membrane normal.      Left Ear: Hearing and tympanic membrane normal.      Mouth/Throat:      Mouth: Mucous membranes are moist.      Pharynx: No oropharyngeal exudate.   Eyes:      Extraocular Movements: Extraocular movements intact.      Conjunctiva/sclera: Conjunctivae normal.   Neck:      Thyroid: No thyromegaly.   Cardiovascular:      Rate and Rhythm: Regular rhythm. Tachycardia present. Occasional Extrasystoles are present.     Heart sounds: Normal heart sounds. No murmur heard.  Pulmonary:      Effort: Pulmonary effort is normal. No respiratory distress.      Breath sounds: Normal breath sounds. No wheezing or rales.   Abdominal:      General: Bowel sounds are normal. There is no distension.      Palpations: Abdomen is soft.      Tenderness: There is no abdominal tenderness. There is no guarding.   Musculoskeletal:         General: Normal range of motion.      Cervical back: Normal range of motion and neck supple.      Right lower leg: No edema.      Left lower leg: No edema.   Lymphadenopathy:      Cervical: No cervical adenopathy.   Skin:     General: Skin is dry.   Neurological:      Mental Status: She is alert.   Psychiatric:         Mood and Affect: Mood is anxious.         Recent Labs   Lab Test 08/01/23  1431 07/27/23  1643 12/20/22  0924 08/01/22  1012   HGB  --  13.7  --  14.3   PLT  --  155  --  198   * 131*   < > 130*   POTASSIUM 4.1 4.1   < > 3.2*   CR 0.63 0.50*   < > 0.56    < > = values in this interval not displayed.        Diagnostics:  Recent Results (from the past 24 hour(s))   Comprehensive " metabolic panel (BMP + Alb, Alk Phos, ALT, AST, Total. Bili, TP)    Collection Time: 08/24/23  3:37 PM   Result Value Ref Range    Sodium 136 136 - 145 mmol/L    Potassium 3.8 3.4 - 5.3 mmol/L    Chloride 98 98 - 107 mmol/L    Carbon Dioxide (CO2) 25 22 - 29 mmol/L    Anion Gap 13 7 - 15 mmol/L    Urea Nitrogen 4.9 (L) 8.0 - 23.0 mg/dL    Creatinine 0.48 (L) 0.51 - 0.95 mg/dL    Calcium 10.0 8.8 - 10.2 mg/dL    Glucose 108 (H) 70 - 99 mg/dL    Alkaline Phosphatase 124 (H) 35 - 104 U/L    AST 52 (H) 0 - 45 U/L    ALT 31 0 - 50 U/L    Protein Total 8.2 6.4 - 8.3 g/dL    Albumin 4.3 3.5 - 5.2 g/dL    Bilirubin Total 1.1 <=1.2 mg/dL    GFR Estimate >90 >60 mL/min/1.73m2   CBC with platelets    Collection Time: 08/24/23  3:37 PM   Result Value Ref Range    WBC Count 6.1 4.0 - 11.0 10e3/uL    RBC Count 3.94 3.80 - 5.20 10e6/uL    Hemoglobin 14.1 11.7 - 15.7 g/dL    Hematocrit 41.3 35.0 - 47.0 %     (H) 78 - 100 fL    MCH 35.8 (H) 26.5 - 33.0 pg    MCHC 34.1 31.5 - 36.5 g/dL    RDW 12.2 10.0 - 15.0 %    Platelet Count 168 150 - 450 10e3/uL   Reflex INR    Collection Time: 08/24/23  3:37 PM   Result Value Ref Range    INR 0.92 0.85 - 1.15      EKG (8/24/2023): appears normal, NSR, normal axis, normal intervals, no acute ST/T changes c/w ischemia, no LVH by voltage criteria, unchanged from previous tracings    Revised Cardiac Risk Index (RCRI):  The patient has the following serious cardiovascular risks for perioperative complications:   - No serious cardiac risks = 0 points     RCRI Interpretation: 0 points: Class I (very low risk - 0.4% complication rate)         Signed Electronically by: Kassandra Prado MD  Copy of this evaluation report is provided to requesting physician.

## 2023-08-24 ENCOUNTER — APPOINTMENT (OUTPATIENT)
Dept: LAB | Facility: OTHER | Age: 68
End: 2023-08-24
Payer: COMMERCIAL

## 2023-08-24 ENCOUNTER — OFFICE VISIT (OUTPATIENT)
Dept: FAMILY MEDICINE | Facility: OTHER | Age: 68
End: 2023-08-24
Attending: FAMILY MEDICINE
Payer: COMMERCIAL

## 2023-08-24 VITALS
OXYGEN SATURATION: 99 % | DIASTOLIC BLOOD PRESSURE: 88 MMHG | WEIGHT: 134 LBS | HEIGHT: 68 IN | BODY MASS INDEX: 20.31 KG/M2 | HEART RATE: 92 BPM | TEMPERATURE: 98 F | SYSTOLIC BLOOD PRESSURE: 158 MMHG

## 2023-08-24 DIAGNOSIS — E87.1 HYPONATREMIA: ICD-10-CM

## 2023-08-24 DIAGNOSIS — R74.01 TRANSAMINITIS: ICD-10-CM

## 2023-08-24 DIAGNOSIS — Z78.9 ALCOHOL USE: ICD-10-CM

## 2023-08-24 DIAGNOSIS — I10 ESSENTIAL HYPERTENSION: ICD-10-CM

## 2023-08-24 DIAGNOSIS — M25.552 HIP PAIN, LEFT: ICD-10-CM

## 2023-08-24 DIAGNOSIS — D75.89 MACROCYTOSIS WITHOUT ANEMIA: ICD-10-CM

## 2023-08-24 DIAGNOSIS — Z01.818 PREOP GENERAL PHYSICAL EXAM: Primary | ICD-10-CM

## 2023-08-24 DIAGNOSIS — F41.9 ANXIETY: ICD-10-CM

## 2023-08-24 LAB
ALBUMIN SERPL BCG-MCNC: 4.3 G/DL (ref 3.5–5.2)
ALP SERPL-CCNC: 124 U/L (ref 35–104)
ALT SERPL W P-5'-P-CCNC: 31 U/L (ref 0–50)
ANION GAP SERPL CALCULATED.3IONS-SCNC: 13 MMOL/L (ref 7–15)
AST SERPL W P-5'-P-CCNC: 52 U/L (ref 0–45)
BILIRUB SERPL-MCNC: 1.1 MG/DL
BUN SERPL-MCNC: 4.9 MG/DL (ref 8–23)
CALCIUM SERPL-MCNC: 10 MG/DL (ref 8.8–10.2)
CHLORIDE SERPL-SCNC: 98 MMOL/L (ref 98–107)
CREAT SERPL-MCNC: 0.48 MG/DL (ref 0.51–0.95)
DEPRECATED HCO3 PLAS-SCNC: 25 MMOL/L (ref 22–29)
ERYTHROCYTE [DISTWIDTH] IN BLOOD BY AUTOMATED COUNT: 12.2 % (ref 10–15)
GFR SERPL CREATININE-BSD FRML MDRD: >90 ML/MIN/1.73M2
GLUCOSE SERPL-MCNC: 108 MG/DL (ref 70–99)
HCT VFR BLD AUTO: 41.3 % (ref 35–47)
HGB BLD-MCNC: 14.1 G/DL (ref 11.7–15.7)
INR PPP: 0.92 (ref 0.85–1.15)
MCH RBC QN AUTO: 35.8 PG (ref 26.5–33)
MCHC RBC AUTO-ENTMCNC: 34.1 G/DL (ref 31.5–36.5)
MCV RBC AUTO: 105 FL (ref 78–100)
PLATELET # BLD AUTO: 168 10E3/UL (ref 150–450)
POTASSIUM SERPL-SCNC: 3.8 MMOL/L (ref 3.4–5.3)
PROT SERPL-MCNC: 8.2 G/DL (ref 6.4–8.3)
RBC # BLD AUTO: 3.94 10E6/UL (ref 3.8–5.2)
SODIUM SERPL-SCNC: 136 MMOL/L (ref 136–145)
WBC # BLD AUTO: 6.1 10E3/UL (ref 4–11)

## 2023-08-24 PROCEDURE — 82607 VITAMIN B-12: CPT | Mod: ZL | Performed by: FAMILY MEDICINE

## 2023-08-24 PROCEDURE — 99214 OFFICE O/P EST MOD 30 MIN: CPT | Performed by: FAMILY MEDICINE

## 2023-08-24 PROCEDURE — 80053 COMPREHEN METABOLIC PANEL: CPT | Mod: ZL | Performed by: FAMILY MEDICINE

## 2023-08-24 PROCEDURE — 85027 COMPLETE CBC AUTOMATED: CPT | Mod: ZL | Performed by: FAMILY MEDICINE

## 2023-08-24 PROCEDURE — 93005 ELECTROCARDIOGRAM TRACING: CPT | Performed by: FAMILY MEDICINE

## 2023-08-24 PROCEDURE — G0463 HOSPITAL OUTPT CLINIC VISIT: HCPCS

## 2023-08-24 PROCEDURE — 85610 PROTHROMBIN TIME: CPT | Mod: ZL | Performed by: FAMILY MEDICINE

## 2023-08-24 PROCEDURE — 93010 ELECTROCARDIOGRAM REPORT: CPT | Mod: 77 | Performed by: INTERNAL MEDICINE

## 2023-08-24 PROCEDURE — 36415 COLL VENOUS BLD VENIPUNCTURE: CPT | Mod: ZL | Performed by: FAMILY MEDICINE

## 2023-08-24 ASSESSMENT — ENCOUNTER SYMPTOMS
NERVOUS/ANXIOUS: 1
ABDOMINAL PAIN: 0
SHORTNESS OF BREATH: 0
DIFFICULTY URINATING: 0
FEVER: 0
ARTHRALGIAS: 1
CHILLS: 0
PALPITATIONS: 0

## 2023-08-25 LAB — VIT B12 SERPL-MCNC: 606 PG/ML (ref 232–1245)

## 2023-08-28 ENCOUNTER — TELEPHONE (OUTPATIENT)
Dept: FAMILY MEDICINE | Facility: OTHER | Age: 68
End: 2023-08-28

## 2023-09-05 DIAGNOSIS — R52 PAIN: ICD-10-CM

## 2023-09-05 DIAGNOSIS — I10 ESSENTIAL HYPERTENSION: ICD-10-CM

## 2023-09-05 RX ORDER — METOPROLOL SUCCINATE 50 MG/1
50 TABLET, EXTENDED RELEASE ORAL DAILY
Qty: 90 TABLET | Refills: 0 | Status: SHIPPED | OUTPATIENT
Start: 2023-09-05 | End: 2023-12-04

## 2023-09-05 RX ORDER — IBUPROFEN 600 MG/1
TABLET, FILM COATED ORAL
Qty: 60 TABLET | Refills: 1 | Status: SHIPPED | OUTPATIENT
Start: 2023-09-05 | End: 2024-06-04

## 2023-09-05 NOTE — TELEPHONE ENCOUNTER
Toprol      Last Written Prescription Date:  10/3/22  Last Fill Quantity: 90,   # refills: 3  Last Office Visit: 8/24/23  Future Office visit:    Next 5 appointments (look out 90 days)      Oct 24, 2023 10:30 AM  (Arrive by 10:15 AM)  PHYSICAL with Kassandra Prado MD  [facility] [place] ([facility] [place] ) [address] [facility] [address]  [phone]             Routing refill request to provider for review/approval because:      Ibuprofen      Last Written Prescription Date:  1/31/23  Last Fill Quantity: 60,   # refills: 1  Last Office Visit: 8/24/23  Future Office visit:    Next 5 appointments (look out 90 days)      Oct 24, 2023 10:30 AM  (Arrive by 10:15 AM)  PHYSICAL with Kassandra Prado MD  [facility] [place] ([facility] [place] ) [address]  [place] [address]  [phone]             Routing refill request to provider for review/approval because:

## 2023-10-01 ENCOUNTER — HEALTH MAINTENANCE LETTER (OUTPATIENT)
Age: 68
End: 2023-10-01

## 2023-10-12 ENCOUNTER — TRANSFERRED RECORDS (OUTPATIENT)
Dept: HEALTH INFORMATION MANAGEMENT | Facility: HOSPITAL | Age: 68
End: 2023-10-12

## 2023-11-08 DIAGNOSIS — Z12.31 VISIT FOR SCREENING MAMMOGRAM: Primary | ICD-10-CM

## 2023-11-09 ENCOUNTER — TRANSFERRED RECORDS (OUTPATIENT)
Dept: HEALTH INFORMATION MANAGEMENT | Facility: CLINIC | Age: 68
End: 2023-11-09

## 2023-11-21 ENCOUNTER — HOSPITAL ENCOUNTER (EMERGENCY)
Facility: HOSPITAL | Age: 68
Discharge: HOME | End: 2023-11-21
Attending: NURSE PRACTITIONER | Admitting: NURSE PRACTITIONER
Payer: COMMERCIAL

## 2023-11-21 VITALS
BODY MASS INDEX: 20.31 KG/M2 | RESPIRATION RATE: 16 BRPM | TEMPERATURE: 98.1 F | SYSTOLIC BLOOD PRESSURE: 198 MMHG | OXYGEN SATURATION: 99 % | DIASTOLIC BLOOD PRESSURE: 110 MMHG | HEART RATE: 103 BPM | WEIGHT: 134.04 LBS | HEIGHT: 68 IN

## 2023-11-21 DIAGNOSIS — J32.4 PANSINUSITIS, UNSPECIFIED CHRONICITY: ICD-10-CM

## 2023-11-21 DIAGNOSIS — J32.9 SINUSITIS: Primary | ICD-10-CM

## 2023-11-21 DIAGNOSIS — J32.4 PANSINUSITIS: ICD-10-CM

## 2023-11-21 PROCEDURE — 99213 OFFICE O/P EST LOW 20 MIN: CPT | Performed by: NURSE PRACTITIONER

## 2023-11-21 PROCEDURE — G0463 HOSPITAL OUTPT CLINIC VISIT: HCPCS

## 2023-11-21 RX ORDER — HYDROXYZINE PAMOATE 25 MG/1
CAPSULE ORAL
COMMUNITY
Start: 2023-08-30 | End: 2024-10-01

## 2023-11-21 RX ORDER — ASPIRIN 81 MG/1
TABLET, COATED ORAL
COMMUNITY
Start: 2023-08-30 | End: 2024-10-01

## 2023-11-21 RX ORDER — HYDROCODONE BITARTRATE AND ACETAMINOPHEN 7.5; 325 MG/1; MG/1
TABLET ORAL
COMMUNITY
Start: 2023-10-20 | End: 2024-10-01

## 2023-11-21 RX ORDER — ONDANSETRON 4 MG/1
TABLET, FILM COATED ORAL
COMMUNITY
Start: 2023-08-30 | End: 2024-10-01

## 2023-11-21 ASSESSMENT — ENCOUNTER SYMPTOMS
SINUS PAIN: 1
SINUS PRESSURE: 1
CHILLS: 0
FEVER: 0

## 2023-11-21 NOTE — DISCHARGE INSTRUCTIONS
Take the antibiotic as prescribed until finished.  Continue taking Claritin and using the Flonase nasal spray.      Follow-up with your doctor if no improvement in symptoms.    Return to urgent care or emergency department for any worsening or concerning symptoms.

## 2023-11-21 NOTE — ED PROVIDER NOTES
History     Chief Complaint   Patient presents with    Sinusitis     Sinus issues     HPI  Estela Schafer is a 68 year old female who presents ambulatory to urgent care for evaluation of sinus symptoms.  Patient reports she has been having sinus pain and pressure for about a month.  She reports frontal and maxillary sinus pain and pressure.  This is accompanied by jaw pain and tooth pain which started most recently.  She has been taking claritin and using flonase nasal spray. No fever, chills, cough or shortness of breath.  Some postnasal drainage.  History of chronic sinus issues and has had sinus surgery in the past.  She notes this helped decrease the frequency of her sinus infections.    Allergies:  Allergies   Allergen Reactions    Lisinopril Cough       Problem List:    Patient Active Problem List    Diagnosis Date Noted    Fatty liver 11/01/2022     Priority: Medium    Anxiety 08/01/2022     Priority: Medium    Seborrheic dermatitis 10/01/2019     Priority: Medium    Personal history of malignant neoplasm of skin 12/29/2015     Priority: Medium     Overview:   BCC chest 2007      History of nonmelanoma skin cancer 12/29/2015     Priority: Medium     BCC chest 2007 [facility]ara      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 Dr Frey      Essential hypertension 03/28/2006     Priority: Medium     Problem list name updated by automated process. Provider to review      Sinusitis, chronic 02/25/2000     Priority: Medium     Problem list name updated by automated process. Provider to review          Past Medical History:    Past Medical History:   Diagnosis Date    Asymptomatic varicose veins  02/04/2013    Basal cell carcinoma 04/26/2007    Esophageal reflux 05/26/2011    Hypertension 03/28/2006    Unspecified sinusitis (chronic) 02/25/2000       Past Surgical History:    Past Surgical History:   Procedure Laterality Date    Arthroscopy right meniscal tear  01/01/2006    Dr Orozco    Arthroscopy right shoulder  01/01/2010    COLONOSCOPY  01/01/2008    Family history of colon cancer (mother). Negative. Repeat 2013    COLONOSCOPY - HIM SCAN N/A 05/17/2016    Colonoscopy diagnostic;  Surgeon:  Gonsalo Prasad MD;  Location:  [facility] [address]    DILATION AND CURETTAGE      Removed mass left neck  01/01/2007    Dr Cervantes    Septoplasty and sinus surgery  01/01/2001    [place] teeth removed         Family History:    Family History   Problem Relation Age of Onset    Cancer Mother         colon    Cancer - colorectal Sister     Hypertension No family hx of     Cerebrovascular Disease No family hx of        Social History:  Marital Status:   [2]  Social History     Tobacco Use    Smoking status: Never     Passive exposure: Never    Smokeless tobacco: Never   Vaping Use    Vaping Use: Never used   Substance Use Topics    Alcohol use: Yes     Comment: occasionally    Drug use: No        Medications:    amoxicillin-clavulanate (AUGMENTIN) 875-125 MG tablet  ASPIRIN LOW DOSE 81 MG EC tablet  clonazePAM (KLONOPIN) 0.5 MG tablet  estrogen conj (PREMARIN) 0.625 MG tablet  estrogen conj-medroxyPROGESTERone (PREMPRO) 0.45-1.5 MG tablet  HYDROcodone-acetaminophen (NORCO) 7.5-325 MG per tablet  hydrocortisone 2.5 % cream  hydrOXYzine (VISTARIL) 25 MG capsule  ibuprofen (ADVIL/MOTRIN) 600 MG tablet  losartan (COZAAR) 50 MG tablet  metoprolol succinate ER (TOPROL XL) 50 MG 24 hr tablet  ondansetron (ZOFRAN) 4 MG tablet  tretinoin (RETIN-A) 0.025 % cream  mometasone (NASONEX) 50 MCG/ACT spray  pimecrolimus (ELIDEL) 1 % external cream          Review of Systems   Constitutional:  Negative for chills and fever.  "  HENT:  Positive for sinus pressure and sinus pain.    All other systems reviewed and are negative.      Physical Exam   BP: (!) 198/110  Pulse: 103  Temp: 98.1  F (36.7  C)  Resp: 16  Height: 172.7 cm (5' 8\")  Weight: 60.8 kg (134 lb 0.6 oz)  SpO2: 99 %      Physical Exam  Vitals and nursing note reviewed.   Constitutional:       General: She is not in acute distress.     Appearance: Normal appearance. She is well-developed.   HENT:      Head: Normocephalic and atraumatic.      Right Ear: Tympanic membrane and ear canal normal.      Left Ear: Tympanic membrane and ear canal normal.      Nose: Nose normal.      Mouth/Throat:      Mouth: Mucous membranes are moist.   Eyes:      Conjunctiva/sclera: Conjunctivae normal.   Cardiovascular:      Rate and Rhythm: Normal rate and regular rhythm.      Heart sounds: Normal heart sounds.   Pulmonary:      Effort: Pulmonary effort is normal. No respiratory distress.      Breath sounds: Normal breath sounds. No wheezing, rhonchi or rales.   Abdominal:      General: Abdomen is flat.   Musculoskeletal:      Cervical back: Normal range of motion and neck supple.   Skin:     General: Skin is warm and dry.      Coloration: Skin is not pale.   Neurological:      Mental Status: She is alert and oriented to person, place, and time.         ED Course                 Procedures             No results found for this or any previous visit (from the past 24 hour(s)).    Medications - No data to display    Assessments & Plan (with Medical Decision Making)   68-year-old female with a history of chronic sinusitis that presented for evaluation of a 1 month history of increased sinus pain and pressure with symptoms progressively worsening in the last few days.  Patient will be treated with Augmentin for sinusitis.  I advised her to continue with Claritin and Flonase nasal spray.  Tylenol or ibuprofen as needed for pain.  Follow-up with primary doctor if no improvement in symptoms.  Return to " urgent care or emergency department for any worsening or concerning symptoms.    I have reviewed the nursing notes.    I have reviewed the findings, diagnosis, plan and need for follow up with the patient.  This document was prepared using a combination of typing and voice generated software.  While every attempt was made for accuracy, spelling and grammatical errors may exist.         Discharge Medication List as of 11/21/2023 12:35 PM        START taking these medications    Details   amoxicillin-clavulanate (AUGMENTIN) 875-125 MG tablet Take 1 tablet by mouth 2 times daily for 10 days, Disp-20 tablet, R-0, E-Prescribe             Final diagnoses:   Sinusitis   Pansinusitis       11/21/2023   [facility]       Mpofu, Prudence, CNP  11/21/23 1321

## 2023-11-21 NOTE — ED TRIAGE NOTES
Pt presents with c/o sinus congestion. Reports it started a month ago. Reports this happens every winter. Pressure in sinus's. Denies other flu-like sx. Pt has been taking Claritin D and nose spray. Also has been taking tylenol on occasion.

## 2023-12-02 DIAGNOSIS — I10 ESSENTIAL HYPERTENSION: ICD-10-CM

## 2023-12-04 RX ORDER — METOPROLOL SUCCINATE 50 MG/1
50 TABLET, EXTENDED RELEASE ORAL DAILY
Qty: 90 TABLET | Refills: 3 | Status: SHIPPED | OUTPATIENT
Start: 2023-12-04

## 2023-12-04 NOTE — TELEPHONE ENCOUNTER
Beta-Blockers Protocol Failed      Blood pressure under 140/90 in past 12 months        BP Readings from Last 3 Encounters:   11/21/23 (!) 198/110   08/24/23 (!) 158/88   08/04/23 (!) 158/94

## 2023-12-04 NOTE — TELEPHONE ENCOUNTER
Toprol      Last Written Prescription Date:  9/5/23  Last Fill Quantity: 90,   # refills: 0  Last Office Visit: 8/24/23  Future Office visit:    Next 5 appointments (look out 90 days)      Dec 07, 2023 11:30 AM  (Arrive by 11:15 AM)  SHORT with Kassandra Prado MD  [facility] [place] ([facility] [place] ) [address]  [place] [address]  [phone]             Routing refill request to provider for review/approval because:

## 2024-02-18 ENCOUNTER — HEALTH MAINTENANCE LETTER (OUTPATIENT)
Age: 69
End: 2024-02-18

## 2024-04-05 DIAGNOSIS — F41.9 ANXIETY: ICD-10-CM

## 2024-04-05 RX ORDER — CLONAZEPAM 0.5 MG/1
TABLET ORAL
Qty: 60 TABLET | Refills: 0 | Status: SHIPPED | OUTPATIENT
Start: 2024-04-05 | End: 2024-10-01

## 2024-04-05 NOTE — TELEPHONE ENCOUNTER
clonazePAM (KLONOPIN) 0.5 MG tablet   Last Written Prescription Date:  7-6-23  Last Fill Quantity: 60,   # refills: 0  Last Office Visit: 8-24-23  Future Office visit:    Next 5 appointments (look out 90 days)      Jun 07, 2024 10:30 AM  (Arrive by 10:15 AM)  Adult Preventative Visit with Kassandra Prado MD  [facility] [place] ([facility] [place] ) [address] [place] [address] AVE  [place] [address]  [phone]             Routing refill request to provider for review/approval because:  Drug not on the [facility], P or  Health refill protocol or controlled substance

## 2024-06-04 DIAGNOSIS — I10 ESSENTIAL HYPERTENSION: ICD-10-CM

## 2024-06-04 DIAGNOSIS — R52 PAIN: ICD-10-CM

## 2024-06-04 RX ORDER — LOSARTAN POTASSIUM 50 MG/1
TABLET ORAL
Qty: 90 TABLET | Refills: 0 | Status: SHIPPED | OUTPATIENT
Start: 2024-06-04 | End: 2024-08-29

## 2024-06-04 RX ORDER — IBUPROFEN 600 MG/1
TABLET, FILM COATED ORAL
Qty: 60 TABLET | Refills: 0 | Status: SHIPPED | OUTPATIENT
Start: 2024-06-04

## 2024-06-04 NOTE — TELEPHONE ENCOUNTER
Angiotensin-II Receptors Baaiap9806/04/2024 02:56 PM   Protocol Details Last blood pressure under 140/90 in past 12 months          NSAID Medications Sceuin7606/04/2024 02:56 PM   Protocol Details Blood pressure under 140/90 in past 12 months    Patient is age 6-64 years    Always Fail Criteria - Chart Review Required

## 2024-06-04 NOTE — TELEPHONE ENCOUNTER
losartan (COZAAR) 50 MG tablet     Last Written Prescription Date:  6-28-23  Last Fill Quantity: 90,   # refills: 3  Last Office Visit: 8-24-23      Routing refill request to provider for review/approval because:  Drug not on the [facility], [facility] or [facility] refill protocol or controlled substance       ibuprofen (ADVIL/MOTRIN) 600 MG tablet    Last Written Prescription Date:  9-5-23  Last Fill Quantity: 60,   # refills: 1  Last Office Visit: 8-24-23  Future Office visit:    Next 5 appointments (look out 90 days)      Jun 07, 2024 10:30 AM  (Arrive by 10:15 AM)  Adult Preventative Visit with Kassandra Prado MD  [facility] ([facility] [place] ) [address]  [place] [address]  [phone]             Routing refill request to provider for review/approval because:  Drug not on the [facility], [facility] or [facility] refill protocol or controlled substance

## 2024-06-10 ENCOUNTER — TRANSFERRED RECORDS (OUTPATIENT)
Dept: HEALTH INFORMATION MANAGEMENT | Facility: CLINIC | Age: 69
End: 2024-06-10

## 2024-06-21 ENCOUNTER — TRANSFERRED RECORDS (OUTPATIENT)
Dept: HEALTH INFORMATION MANAGEMENT | Facility: CLINIC | Age: 69
End: 2024-06-21

## 2024-06-21 ENCOUNTER — MEDICAL CORRESPONDENCE (OUTPATIENT)
Dept: HEALTH INFORMATION MANAGEMENT | Facility: HOSPITAL | Age: 69
End: 2024-06-21

## 2024-06-21 ENCOUNTER — TELEPHONE (OUTPATIENT)
Dept: FAMILY MEDICINE | Facility: OTHER | Age: 69
End: 2024-06-21

## 2024-06-21 NOTE — TELEPHONE ENCOUNTER
9:11 AM    Reason for Call: OVERBOOK    Patient is having the following symptoms: Pre-op needed on 07/12 for Cataracts surgery: Left eye is on 07/22 and RT eye is 08/12 at [facility]. Please schedule on 07/12 per [facility]. Please call patient to confirm date of Pre-op..    The patient is requesting an appointment for pre-op with Johan.    Was an appointment offered for this call? No  If yes : Appointment type              Date    Preferred method for responding to this message: Telephone Call  What is your phone number ?  [phone]    If we cannot reach you directly, may we leave a detailed response at the number you provided? Yes    Can this message wait until your PCP/provider returns, if unavailable today? Please call to schedule.    Jacqueline Kauffman

## 2024-08-29 DIAGNOSIS — I10 ESSENTIAL HYPERTENSION: ICD-10-CM

## 2024-08-29 RX ORDER — LOSARTAN POTASSIUM 50 MG/1
TABLET ORAL
Qty: 90 TABLET | Refills: 0 | Status: SHIPPED | OUTPATIENT
Start: 2024-08-29 | End: 2024-10-01

## 2024-08-29 NOTE — TELEPHONE ENCOUNTER
LOSARTAN POTASSIUM 50 MG TAB       Last Written Prescription Date:  06/04/2024  Last Fill Quantity: 90,   # refills: 0  Last Office Visit: 08/24/2023  Future Office visit:    Next 5 appointments (look out 90 days)      Oct 01, 2024 11:30 AM  (Arrive by 11:15 AM)  Pre-Operative Physical with Kassandra Prado MD  [facility] ([facility] ) [address]  [place] [address]  [phone]             Routing refill request to provider for review/approval because:    Angiotensin-II Receptors [id number]/2024 08:24 AM   Protocol Details Last blood pressure under 140/90 in past 12 months    Has GFR on file in past 12 months and most recent value is normal    Normal serum potassium on file in past 12 months        Kimberly Boecker, RN

## 2024-09-17 ENCOUNTER — TRANSFERRED RECORDS (OUTPATIENT)
Dept: HEALTH INFORMATION MANAGEMENT | Facility: CLINIC | Age: 69
End: 2024-09-17

## 2024-09-30 PROBLEM — F10.90 ALCOHOL USE: Status: ACTIVE | Noted: 2024-09-30

## 2024-09-30 PROBLEM — Z78.9 ALCOHOL USE: Status: ACTIVE | Noted: 2024-09-30

## 2024-09-30 RX ORDER — MOXIFLOXACIN 5 MG/ML
SOLUTION/ DROPS OPHTHALMIC
COMMUNITY
Start: 2024-06-21

## 2024-09-30 NOTE — PATIENT INSTRUCTIONS
Increase your losartan to 50mg twice per day. Please let me know if you get lightheadness       How to Take Your Medication Before Surgery  Preoperative Medication Instructions   Antiplatelet or Anticoagulation Medication Instructions   - aspirin: Discontinue aspirin 7-10 days prior to procedure to reduce bleeding risk. It should be resumed postoperatively.     Additional Medication Instructions  Take all scheduled medications on the day of surgery   - ACE/ARB: Continue without modification (e.g., MAC anesthesia, neurosurgery, spine surgery, heart failure, or labile hypertension with risk of hypertension).   - Beta Blockers: Continue taking on the day of surgery.   - ibuprofen (Advil, Motrin): DO NOT TAKE 1 day before surgery.        Patient Education   Preparing for Your Surgery  For Adults  Getting started  In most cases, a nurse will call to review your health history and instructions. They will give you an arrival time based on your scheduled surgery time. Please be ready to share:  Your doctor's clinic name and phone number  Your medical, surgical, and anesthesia history  A list of allergies and sensitivities  A list of medicines, including herbal treatments and over-the-counter drugs  Whether the patient has a legal guardian (ask how to send us the papers in advance)  Note: You may not receive a call if you were seen at our PAC (Preoperative Assessment Center).  Please tell us if you're pregnant--or if there's any chance you might be pregnant. Some surgeries may injure a fetus (unborn baby), so they require a pregnancy test. Surgeries that are safe for a fetus don't always need a test, and you can choose whether to have one.   Preparing for surgery  Within 10 to 30 days of surgery: Have a pre-op exam (sometimes called an H&P, or History and Physical). This can be done at a clinic or pre-operative center.  If you're having a , you may not need this exam. Talk to your care team.  At your pre-op exam,  talk to your care team about all medicines you take. (This includes CBD oil and any drugs, such as THC, marijuana, and other forms of cannabis.) If you need to stop any medicine before surgery, ask when to start taking it again.  This is for your safety. Many medicines and drugs can make you bleed too much during surgery. Some change how well surgery (anesthesia) drugs work.  Call your insurance company to let them know you're having surgery. (If you don't have insurance, call [phone].)  Call your clinic if there's any change in your health. This includes a scrape or scratch near the surgery site, or any signs of a cold (sore throat, runny nose, cough, rash, fever).  Eating and drinking guidelines  For your safety: Unless your surgeon tells you otherwise, follow the guidelines below.  Eat and drink as normal until 8 hours before you arrive for surgery. After that, no food or milk. You can spit out gum when you arrive.  Drink clear liquids until 2 hours before you arrive. These are liquids you can see through, like water, Gatorade, and Propel Water. They also include plain black coffee and tea (no cream or milk).  No alcohol for 24 hours before you arrive. The night before surgery, stop any drinks that contain THC.  If your care team tells you to take medicine on the morning of surgery, it's okay to take it with a sip of water. No other medicines or drugs are allowed (including CBD oil)--follow your care team's instructions.  If you have questions the day of surgery, call your hospital or surgery center.   Preventing infection  Shower or bathe the night before and the morning of surgery. Follow the instructions your clinic gave you. (If no instructions, use regular soap.)  Don't shave or clip hair near your surgery site. We'll remove the hair if needed.  Don't smoke or vape the morning of surgery. No chewing tobacco for 6 hours before you arrive. A nicotine patch is okay. You may spit out nicotine gum when you  arrive.  For some surgeries, the surgeon will tell you to fully quit smoking and nicotine.  We will make every effort to keep you safe from infection. We will:  Clean our hands often with soap and water (or an alcohol-based hand rub).  Clean the skin at your surgery site with a special soap that kills germs.  Give you a special gown to keep you warm. (Cold raises the risk of infection.)  Wear hair covers, masks, gowns, and gloves during surgery.  Give antibiotic medicine, if prescribed. Not all surgeries need this medicine.  What to bring on the day of surgery  Photo ID and insurance card  Copy of your health care directive, if you have one  Glasses and hearing aids (bring cases)  You can't wear contacts during surgery  Inhaler and eye drops, if you use them (tell us about these when you arrive)  CPAP machine or breathing device, if you use them  A few personal items, if spending the night  If you have . . .  A pacemaker, ICD (cardiac defibrillator), or other implant: Bring the ID card.  An implanted stimulator: Bring the remote control.  A legal guardian: Bring a copy of the certified (court-stamped) guardianship papers.  Please remove any jewelry, including body piercings. Leave jewelry and other valuables at home.  If you're going home the day of surgery  You must have a responsible adult drive you home. They should stay with you overnight as well.  If you don't have someone to stay with you, and you aren't safe to go home alone, we may keep you overnight. Insurance often won't pay for this.  After surgery  If it's hard to control your pain or you need more pain medicine, please call your surgeon's office.  Questions?   If you have any questions for your care team, list them here:    ____________________________________________________________________________________________________________________________________________________________________________________________________________________________________________________________  For informational purposes only. Not to replace the advice of your health care provider. Copyright   2003, 2019 [facility]. All rights reserved. Clinically reviewed by Christiano Farfan MD. [employer] [id number] - REV 08/24.

## 2024-09-30 NOTE — PROGRESS NOTES
Preoperative Evaluation  [facility] - [place]  [address]  [place] [address]  Phone: [phone]  Primary Provider: Kassandra Prado MD  Pre-op Performing Provider: Kassandra Prado MD  Oct 1, 2024             10/1/2024   Surgical Information   What procedure is being done? Phacoemulsification Cataract Extraction Posterior Chamber Lens Left/Right Eye   Facility or Hospital where procedure/surgery will be performed: [facility] [place] [facility]   Who is doing the procedure / surgery? Christopher   Date of surgery / procedure: 10/09/2024 (left) and 10/23/2024 (Right)   Time of surgery / procedure: 0800   Where do you plan to recover after surgery? at home with family      Fax number for surgical facility: Note does not need to be faxed, will be available electronically in Epic.    Assessment & Plan     The proposed surgical procedure is considered LOW risk.    Preop general physical exam  BP above goal and chronically above goal in clinic and ER. Estela reports her BP at home is 130s. She has anxiety regarding to clinic. At recheck on Monday, if Estela has a , she will trial a clonazepam to help with clinic anxiety. Estela is able to walk 3 miles w/o cardiopulmonary issues   - EKG 12-lead complete w/read - (Clinic Performed)    Age-related cataract of both eyes, unspecified age-related cataract type  Reason for the procedure     Essential hypertension  BP at goal at  home, consideration anxiety contributing to elevated BP, but there is room with home blood pressure to increase HTN meds  - increase - losartan (COZAAR) 50 MG tablet; Take 1 tablet (50 mg) by mouth 2 times daily.  - c/w metoprolol   - recheck on Monday    Fatty liver  LFTs stable. Plt 201  - Comprehensive metabolic panel; Future  - CBC with platelets; Future  - Lipid Profile; Future    Transaminitis / Serum total bilirubin elevated / Alcohol use  Elevated bilirubin today, BUN low.   Estela reports drinking 1 to 2 glasses of wine, 4 nights per  week     Hyponatremia  Mild, chronic issues. Na corrects to 134 with glucose. Serum osm wnl   TSH (8/1/2023) wnl. LFTs wnl, but total bili elevated  - Osmolarity ( Serum)  - urine studies ordered for visit on Monday     Anxiety  [place] PDMP reviewed and appropriate    - clonazePAM (KLONOPIN) 0.5 MG tablet; TAKE 1/2 TO 1 TABLET BY MOUTH TWICE DAILY ASNEEDED FOR ANXIETY    Hyperglycemia  A1c wnl  - Hemoglobin A1c         Risks and Recommendations  The patient has the following additional risks and recommendations for perioperative complications:  Social and Substance:    - Alcohol use and risk of withdrawal    Preoperative Medication Instructions  Antiplatelet or Anticoagulation Medication Instructions   - aspirin: Discontinue aspirin 7-10 days prior to procedure to reduce bleeding risk. It should be resumed postoperatively.     Additional Medication Instructions  Take all scheduled medications on the day of surgery   - ACE/ARB: Continue without modification (e.g., MAC anesthesia, neurosurgery, spine surgery, heart failure, or labile hypertension with risk of hypertension).   - Beta Blockers: Continue taking on the day of surgery.   - ibuprofen (Advil, Motrin): DO NOT TAKE 1 day before surgery.     Recommendation  Estela is NOT optimized to proceed with proposed procedure, without further diagnostic evaluation.  Recheck of blood pressure on Monday     Subjective   Estela is a 69 year old, presenting for the following:  Pre-Op Exam          10/1/2024    11:32 AM   Additional Questions   Roomed by Lois Stiles   Accompanied by self     HPI related to upcoming procedure: Estela has had issues with vision for the past couple of years         10/1/2024   Pre-Op Questionnaire   Have you ever had a heart attack or stroke? No   Have you ever had surgery on your heart or blood vessels, such as a stent placement, a coronary artery bypass, or surgery on an artery in your head, neck, heart, or legs? No   Do you have chest pain with  activity? No   Do you have a history of heart failure? No   Do you currently have a cold, bronchitis or symptoms of other infection? No   Do you have a cough, shortness of breath, or wheezing? No   Do you or anyone in your family have previous history of blood clots? No   Do you or does anyone in your family have a serious bleeding problem such as prolonged bleeding following surgeries or cuts? No   Have you ever had problems with anemia or been told to take iron pills? No   Have you had any abnormal blood loss such as black, tarry or bloody stools, or abnormal vaginal bleeding? No   Have you ever had a blood transfusion? No   Are you willing to have a blood transfusion if it is medically needed before, during, or after your surgery? Yes   Have you or any of your relatives ever had problems with anesthesia? No   Do you have sleep apnea, excessive snoring or daytime drowsiness? No   Do you have any artifical heart valves or other implanted medical devices like a pacemaker, defibrillator, or continuous glucose monitor? No   Do you have artificial joints? (!) YES =- left hip   Are you allergic to latex? No      Health Care Directive  Patient does not have a Health Care Directive or Living Will: Discussed advance care planning with patient; however, patient declined at this time.    Preoperative Review of    reviewed - old Rx for ativan and norco      Status of Chronic Conditions:  DEPRESSION/ anxiety / alcohol  - Patient has a long history of Depression of moderate severity requiring medication for control with recent symptoms being stable..Current symptoms of depression include anxiety (lifelong).   - US abd not completed  - alcohol use - 1 to 2 glass of wine four times per week   - uses clonazepam if wakes up at night.     HYPERTENSION - Patient has longstanding history of HTN , currently denies any symptoms referable to elevated blood pressure. Specifically denies chest pain, palpitations, dyspnea, orthopnea,  PND or peripheral edema. Blood pressure readings have not been in normal range. Current medication regimen is as listed below. Patient denies any side effects of medication.   - losartan 50mg every day, takes in the evening  - metoprolol succinate 50mg every day, takes in the morning    - blood pressure at home is good. Has a home cuff blood pressure machine   - home blood pressure 130s  - gets anxiety when coming into clinic. Does not take clonazepam before coming to clinic. Only takes at night. Does not drive if taking clonazepam     BP Readings from Last 6 Encounters:   10/01/24 (!) 182/90   11/21/23 (!) 198/110   08/24/23 (!) 158/88   08/04/23 (!) 158/94   08/01/23 (!) 180/106   07/27/23 152/91           # Caprini score of 4    # can walk 3 miles w/o issues    Patient Active Problem List    Diagnosis Date Noted    Alcohol use 09/30/2024     Priority: Medium    Fatty liver 11/01/2022     Priority: Medium    Anxiety 08/01/2022     Priority: Medium    Seborrheic dermatitis 10/01/2019     Priority: Medium    Personal history of malignant neoplasm of skin 12/29/2015     Priority: Medium     Overview:   BCC chest 2007      History of nonmelanoma skin cancer 12/29/2015     Priority: Medium     BCC chest 2007 [facility]      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 Dr Frey      Essential hypertension 03/28/2006     Priority: Medium     Problem list name updated by automated process. Provider to review      Sinusitis, chronic 02/25/2000     Priority: Medium     Problem list name updated by automated process. Provider to review        Past Medical History:   Diagnosis Date    Asymptomatic  varicose veins 02/04/2013    Basal cell carcinoma 04/26/2007    [facility] chest wall. Dr Frey    Esophageal reflux 05/26/2011    Hypertension 03/28/2006    Unspecified sinusitis (chronic) 02/25/2000     Past Surgical History:   Procedure Laterality Date    Arthroscopy right meniscal tear  01/01/2006    Dr Orozco    Arthroscopy right shoulder  01/01/2010    COLONOSCOPY  01/01/2008    Family history of colon cancer (mother). Negative. Repeat 2013    COLONOSCOPY - HIM SCAN N/A 05/17/2016    Colonoscopy diagnostic;  Surgeon:  Gonsalo Prasad MD;  Location:  [facility]    DILATION AND CURETTAGE      Removed mass left neck  01/01/2007    Dr Cervantes    Septoplasty and sinus surgery  01/01/2001    [place] teeth removed       Current Outpatient Medications   Medication Sig Dispense Refill    bromfenac (BROMSITE) 0.075 % SOLN ophthalmic solution       clonazePAM (KLONOPIN) 0.5 MG tablet TAKE 1/2 TO 1 TABLET BY MOUTH TWICE DAILY ASNEEDED FOR ANXIETY 60 tablet 0    difluprednate (DUREZOL) 0.05 % ophthalmic emulsion       estrogen conj-medroxyPROGESTERone (PREMPRO) 0.45-1.5 MG tablet Take 1 tablet by mouth daily      hydrocortisone 2.5 % cream APPLY TOPICALLY TO AFFECTED EAR TWICE DAILY AS NEEDED FOR ITCHING      ibuprofen (ADVIL/MOTRIN) 600 MG tablet TAKE 1 TABLET BY MOUTH EVERY 8 HOURS AS NEEDED FOR PAIN 60 tablet 0    losartan (COZAAR) 50 MG tablet TAKE 1 TABLET BY MOUTH DAILY 90 tablet 0    metoprolol succinate ER (TOPROL XL) 50 MG 24 hr tablet TAKE 1 TABLET BY MOUTH DAILY 90 tablet 3    moxifloxacin (VIGAMOX) 0.5 % ophthalmic solution       tretinoin (RETIN-A) 0.025 % cream APPLY TOPICALLY AT BEDTIME      amoxicillin (AMOXIL) 500 MG capsule  (Patient not taking: Reported on 10/1/2024)      ASPIRIN LOW DOSE 81 MG EC tablet  (Patient not taking: Reported on 10/1/2024)      estrogen conj (PREMARIN) 0.625 MG tablet Take  by mouth daily. (Patient not taking: Reported on 10/1/2024)      HYDROcodone-acetaminophen  "(NORCO) 7.5-325 MG per tablet  (Patient not taking: Reported on 10/1/2024)      hydrOXYzine (VISTARIL) 25 MG capsule  (Patient not taking: Reported on 10/1/2024)      mometasone (NASONEX) 50 MCG/ACT spray SPRAY 2 SPRAYS INTO EACH NOSTRIL DAILY AS NEEDED (Patient not taking: Reported on 8/24/2023) 17 g 11    ondansetron (ZOFRAN) 4 MG tablet  (Patient not taking: Reported on 10/1/2024)      pimecrolimus (ELIDEL) 1 % external cream APPLY TO AREA OF RASH ON THE FACE TWICE DAILY (Patient not taking: Reported on 8/24/2023)         Allergies   Allergen Reactions    Lisinopril Cough        Social History     Tobacco Use    Smoking status: Never     Passive exposure: Never    Smokeless tobacco: Never   Substance Use Topics    Alcohol use: Yes     Comment: occasionally       History   Drug Use No             Review of Systems  Constitutional, HEENT, cardiovascular, pulmonary, gi and gu systems are negative, except as otherwise noted.    Objective    BP (!) 182/90   Pulse 80   Temp 96.8  F (36  C) (Tympanic)   Resp 16   Ht 1.727 m (5' 8\")   Wt 61.6 kg (135 lb 12.8 oz)   SpO2 99%   BMI 20.65 kg/m     Estimated body mass index is 20.65 kg/m  as calculated from the following:    Height as of this encounter: 1.727 m (5' 8\").    Weight as of this encounter: 61.6 kg (135 lb 12.8 oz).  Physical Exam  Constitutional:       General: She is not in acute distress.     Appearance: She is well-developed.   HENT:      Head: Normocephalic and atraumatic.      Right Ear: Hearing and tympanic membrane normal.      Left Ear: Hearing and tympanic membrane normal.      Mouth/Throat:      Mouth: Mucous membranes are moist.      Pharynx: No oropharyngeal exudate.   Eyes:      Extraocular Movements: Extraocular movements intact.      Conjunctiva/sclera: Conjunctivae normal.   Neck:      Thyroid: No thyromegaly.   Cardiovascular:      Rate and Rhythm: Normal rate and regular rhythm.      Pulses: Normal pulses.      Heart sounds: Normal heart " sounds. No murmur heard.  Pulmonary:      Effort: Pulmonary effort is normal. No respiratory distress.      Breath sounds: Normal breath sounds. No wheezing or rales.   Abdominal:      General: Bowel sounds are normal. There is no distension.      Palpations: Abdomen is soft.      Tenderness: There is no abdominal tenderness. There is no guarding.   Musculoskeletal:         General: Normal range of motion.      Cervical back: Normal range of motion and neck supple.      Right lower leg: No edema.      Left lower leg: No edema.   Lymphadenopathy:      Cervical: No cervical adenopathy.   Skin:     General: Skin is dry.   Neurological:      Mental Status: She is alert.   Psychiatric:         Mood and Affect: Mood is anxious.         Diagnostics  Recent Results (from the past 24 hour(s))   Lipid Profile    Collection Time: 10/01/24 11:22 AM   Result Value Ref Range    Cholesterol 241 (H) <200 mg/dL    Triglycerides 66 <150 mg/dL    Direct Measure  >=50 mg/dL    LDL Cholesterol Calculated 89 <100 mg/dL    Non HDL Cholesterol 102 <130 mg/dL    Patient Fasting > 8hrs? No    CBC with platelets    Collection Time: 10/01/24 11:22 AM   Result Value Ref Range    WBC Count 5.6 4.0 - 11.0 10e3/uL    RBC Count 3.83 3.80 - 5.20 10e6/uL    Hemoglobin 13.8 11.7 - 15.7 g/dL    Hematocrit 39.3 35.0 - 47.0 %     (H) 78 - 100 fL    MCH 36.0 (H) 26.5 - 33.0 pg    MCHC 35.1 31.5 - 36.5 g/dL    RDW 11.9 10.0 - 15.0 %    Platelet Count 201 150 - 450 10e3/uL   Comprehensive metabolic panel    Collection Time: 10/01/24 11:22 AM   Result Value Ref Range    Sodium 133 (L) 135 - 145 mmol/L    Potassium 3.6 3.4 - 5.3 mmol/L    Carbon Dioxide (CO2) 23 22 - 29 mmol/L    Anion Gap 15 7 - 15 mmol/L    Urea Nitrogen 3.9 (L) 8.0 - 23.0 mg/dL    Creatinine 0.54 0.51 - 0.95 mg/dL    GFR Estimate >90 >60 mL/min/1.73m2    Calcium 9.6 8.8 - 10.4 mg/dL    Chloride 95 (L) 98 - 107 mmol/L    Glucose 160 (H) 70 - 99 mg/dL    Alkaline Phosphatase  102 40 - 150 U/L    AST 44 0 - 45 U/L    ALT 28 0 - 50 U/L    Protein Total 7.8 6.4 - 8.3 g/dL    Albumin 4.5 3.5 - 5.2 g/dL    Bilirubin Total 1.3 (H) <=1.2 mg/dL    Patient Fasting > 8hrs? No    Hemoglobin A1c    Collection Time: 10/01/24 11:22 AM   Result Value Ref Range    Estimated Average Glucose 85 <117 mg/dL    Hemoglobin A1C 4.6 <5.7 %   Osmolarity ( Serum)    Collection Time: 10/01/24 11:22 AM   Result Value Ref Range    Osmolality Blood 291 280 - 301 mmol/kg   EKG 12-lead complete w/read - (Clinic Performed)    Collection Time: 10/01/24 12:13 PM   Result Value Ref Range    Systolic Blood Pressure  mmHg    Diastolic Blood Pressure  mmHg    Ventricular Rate 64 BPM    Atrial Rate 64 BPM    NH Interval 170 ms    QRS Duration 88 ms     ms    QTc 447 ms    P Axis 37 degrees    R AXIS 7 degrees    T Axis 22 degrees    Interpretation ECG       Sinus rhythm  Possible Left atrial enlargement  Borderline ECG  No previous ECGs available  Confirmed by MD Callum, Loc ([id number]) on 10/1/2024 3:07:58 PM        EKG (10/1/2024): Normal Sinus Rhythm, normal axis, normal intervals, no acute ST/T changes c/w ischemia, Tw inversion III, V3 (unchanged) no LVH by voltage criteria, unchanged from previous tracings (8/24/2023)    Revised Cardiac Risk Index (RCRI)  The patient has the following serious cardiovascular risks for perioperative complications:   - No serious cardiac risks = 0 points     RCRI Interpretation: 0 points: Class I (very low risk - 0.4% complication rate)    The longitudinal plan of care for the diagnosis(es)/condition(s) as documented were addressed during this visit. Due to the added complexity in care, I will continue to support Estela in the subsequent management and with ongoing continuity of care.     Signed Electronically by: Kassandra Prado MD  A copy of this evaluation report is provided to the requesting physician.

## 2024-10-01 ENCOUNTER — HOSPITAL ENCOUNTER (OUTPATIENT)
Facility: HOSPITAL | Age: 69
End: 2024-10-01
Attending: OPHTHALMOLOGY | Admitting: OPHTHALMOLOGY
Payer: COMMERCIAL

## 2024-10-01 ENCOUNTER — OFFICE VISIT (OUTPATIENT)
Dept: FAMILY MEDICINE | Facility: OTHER | Age: 69
End: 2024-10-01
Attending: FAMILY MEDICINE
Payer: COMMERCIAL

## 2024-10-01 ENCOUNTER — LAB (OUTPATIENT)
Dept: LAB | Facility: OTHER | Age: 69
End: 2024-10-01
Attending: FAMILY MEDICINE
Payer: COMMERCIAL

## 2024-10-01 VITALS
SYSTOLIC BLOOD PRESSURE: 182 MMHG | DIASTOLIC BLOOD PRESSURE: 90 MMHG | RESPIRATION RATE: 16 BRPM | TEMPERATURE: 96.8 F | HEART RATE: 80 BPM | WEIGHT: 135.8 LBS | HEIGHT: 68 IN | BODY MASS INDEX: 20.58 KG/M2 | OXYGEN SATURATION: 99 %

## 2024-10-01 DIAGNOSIS — Z01.818 PREOP GENERAL PHYSICAL EXAM: Primary | ICD-10-CM

## 2024-10-01 DIAGNOSIS — F41.9 ANXIETY: ICD-10-CM

## 2024-10-01 DIAGNOSIS — R17 SERUM TOTAL BILIRUBIN ELEVATED: ICD-10-CM

## 2024-10-01 DIAGNOSIS — K76.0 FATTY LIVER: ICD-10-CM

## 2024-10-01 DIAGNOSIS — R73.9 HYPERGLYCEMIA: ICD-10-CM

## 2024-10-01 DIAGNOSIS — H25.9 AGE-RELATED CATARACT OF BOTH EYES, UNSPECIFIED AGE-RELATED CATARACT TYPE: ICD-10-CM

## 2024-10-01 DIAGNOSIS — Z78.9 ALCOHOL USE: ICD-10-CM

## 2024-10-01 DIAGNOSIS — R74.01 TRANSAMINITIS: ICD-10-CM

## 2024-10-01 DIAGNOSIS — E87.1 HYPONATREMIA: ICD-10-CM

## 2024-10-01 DIAGNOSIS — I10 ESSENTIAL HYPERTENSION: ICD-10-CM

## 2024-10-01 LAB
ALBUMIN SERPL BCG-MCNC: 4.5 G/DL (ref 3.5–5.2)
ALP SERPL-CCNC: 102 U/L (ref 40–150)
ALT SERPL W P-5'-P-CCNC: 28 U/L (ref 0–50)
ANION GAP SERPL CALCULATED.3IONS-SCNC: 15 MMOL/L (ref 7–15)
AST SERPL W P-5'-P-CCNC: 44 U/L (ref 0–45)
ATRIAL RATE - MUSE: 64 BPM
BILIRUB SERPL-MCNC: 1.3 MG/DL
BUN SERPL-MCNC: 3.9 MG/DL (ref 8–23)
CALCIUM SERPL-MCNC: 9.6 MG/DL (ref 8.8–10.4)
CHLORIDE SERPL-SCNC: 95 MMOL/L (ref 98–107)
CHOLEST SERPL-MCNC: 241 MG/DL
CREAT SERPL-MCNC: 0.54 MG/DL (ref 0.51–0.95)
DIASTOLIC BLOOD PRESSURE - MUSE: NORMAL MMHG
EGFRCR SERPLBLD CKD-EPI 2021: >90 ML/MIN/1.73M2
ERYTHROCYTE [DISTWIDTH] IN BLOOD BY AUTOMATED COUNT: 11.9 % (ref 10–15)
EST. AVERAGE GLUCOSE BLD GHB EST-MCNC: 85 MG/DL
FASTING STATUS PATIENT QL REPORTED: NO
FASTING STATUS PATIENT QL REPORTED: NO
GLUCOSE SERPL-MCNC: 160 MG/DL (ref 70–99)
HBA1C MFR BLD: 4.6 %
HCO3 SERPL-SCNC: 23 MMOL/L (ref 22–29)
HCT VFR BLD AUTO: 39.3 % (ref 35–47)
HDLC SERPL-MCNC: 139 MG/DL
HGB BLD-MCNC: 13.8 G/DL (ref 11.7–15.7)
INTERPRETATION ECG - MUSE: NORMAL
LDLC SERPL CALC-MCNC: 89 MG/DL
MCH RBC QN AUTO: 36 PG (ref 26.5–33)
MCHC RBC AUTO-ENTMCNC: 35.1 G/DL (ref 31.5–36.5)
MCV RBC AUTO: 103 FL (ref 78–100)
NONHDLC SERPL-MCNC: 102 MG/DL
OSMOLALITY SERPL: 291 MMOL/KG (ref 280–301)
P AXIS - MUSE: 37 DEGREES
PLATELET # BLD AUTO: 201 10E3/UL (ref 150–450)
POTASSIUM SERPL-SCNC: 3.6 MMOL/L (ref 3.4–5.3)
PR INTERVAL - MUSE: 170 MS
PROT SERPL-MCNC: 7.8 G/DL (ref 6.4–8.3)
QRS DURATION - MUSE: 88 MS
QT - MUSE: 434 MS
QTC - MUSE: 447 MS
R AXIS - MUSE: 7 DEGREES
RBC # BLD AUTO: 3.83 10E6/UL (ref 3.8–5.2)
SODIUM SERPL-SCNC: 133 MMOL/L (ref 135–145)
SYSTOLIC BLOOD PRESSURE - MUSE: NORMAL MMHG
T AXIS - MUSE: 22 DEGREES
TRIGL SERPL-MCNC: 66 MG/DL
VENTRICULAR RATE- MUSE: 64 BPM
WBC # BLD AUTO: 5.6 10E3/UL (ref 4–11)

## 2024-10-01 PROCEDURE — G0463 HOSPITAL OUTPT CLINIC VISIT: HCPCS

## 2024-10-01 PROCEDURE — 83036 HEMOGLOBIN GLYCOSYLATED A1C: CPT | Mod: ZL | Performed by: FAMILY MEDICINE

## 2024-10-01 PROCEDURE — G2211 COMPLEX E/M VISIT ADD ON: HCPCS | Performed by: FAMILY MEDICINE

## 2024-10-01 PROCEDURE — G0463 HOSPITAL OUTPT CLINIC VISIT: HCPCS | Mod: 25

## 2024-10-01 PROCEDURE — 85018 HEMOGLOBIN: CPT | Mod: ZL

## 2024-10-01 PROCEDURE — 93005 ELECTROCARDIOGRAM TRACING: CPT | Performed by: FAMILY MEDICINE

## 2024-10-01 PROCEDURE — 83930 ASSAY OF BLOOD OSMOLALITY: CPT | Mod: ZL | Performed by: FAMILY MEDICINE

## 2024-10-01 PROCEDURE — 36415 COLL VENOUS BLD VENIPUNCTURE: CPT | Mod: ZL

## 2024-10-01 PROCEDURE — 82465 ASSAY BLD/SERUM CHOLESTEROL: CPT | Mod: ZL

## 2024-10-01 PROCEDURE — 99214 OFFICE O/P EST MOD 30 MIN: CPT | Performed by: FAMILY MEDICINE

## 2024-10-01 PROCEDURE — 93010 ELECTROCARDIOGRAM REPORT: CPT | Performed by: INTERNAL MEDICINE

## 2024-10-01 PROCEDURE — 82310 ASSAY OF CALCIUM: CPT | Mod: ZL

## 2024-10-01 RX ORDER — LIDOCAINE 40 MG/G
CREAM TOPICAL
Status: CANCELLED | OUTPATIENT
Start: 2024-10-01

## 2024-10-01 RX ORDER — LOSARTAN POTASSIUM 50 MG/1
50 TABLET ORAL 2 TIMES DAILY
Qty: 180 TABLET | Refills: 0 | Status: SHIPPED | OUTPATIENT
Start: 2024-10-01

## 2024-10-01 RX ORDER — OXYCODONE HYDROCHLORIDE 5 MG/1
5 TABLET ORAL
Status: CANCELLED | OUTPATIENT
Start: 2024-10-01

## 2024-10-01 RX ORDER — BROMFENAC 0.76 MG/ML
SOLUTION/ DROPS OPHTHALMIC
COMMUNITY
Start: 2024-09-23

## 2024-10-01 RX ORDER — DIFLUPREDNATE OPHTHALMIC 0.5 MG/ML
EMULSION OPHTHALMIC
COMMUNITY
Start: 2024-09-23

## 2024-10-01 RX ORDER — ONDANSETRON 4 MG/1
4 TABLET, ORALLY DISINTEGRATING ORAL EVERY 30 MIN PRN
Status: CANCELLED | OUTPATIENT
Start: 2024-10-01

## 2024-10-01 RX ORDER — DEXAMETHASONE SODIUM PHOSPHATE 10 MG/ML
4 INJECTION, SOLUTION INTRAMUSCULAR; INTRAVENOUS
Status: CANCELLED | OUTPATIENT
Start: 2024-10-01

## 2024-10-01 RX ORDER — NALOXONE HYDROCHLORIDE 0.4 MG/ML
0.1 INJECTION, SOLUTION INTRAMUSCULAR; INTRAVENOUS; SUBCUTANEOUS
Status: CANCELLED | OUTPATIENT
Start: 2024-10-01

## 2024-10-01 RX ORDER — ONDANSETRON 2 MG/ML
4 INJECTION INTRAMUSCULAR; INTRAVENOUS EVERY 30 MIN PRN
Status: CANCELLED | OUTPATIENT
Start: 2024-10-01

## 2024-10-01 RX ORDER — CLONAZEPAM 0.5 MG/1
TABLET ORAL
Qty: 60 TABLET | Refills: 0 | Status: SHIPPED | OUTPATIENT
Start: 2024-10-01

## 2024-10-01 RX ORDER — OXYCODONE HYDROCHLORIDE 5 MG/1
10 TABLET ORAL
Status: CANCELLED | OUTPATIENT
Start: 2024-10-01

## 2024-10-01 ASSESSMENT — PAIN SCALES - GENERAL: PAINLEVEL: NO PAIN (0)

## 2024-10-01 NOTE — PROGRESS NOTES
Assessment & Plan     Essential hypertension  BP above goal.   Discussed cutting back (or stopping) alcohol use due to it contribution to blood pressure issues  - c/w losartan 50mg bid  - c/w metoprolol  - start amlodipine    Anxiety  Took clonazepam this morning, some issues with anxiety in clinic remaining  Estela is not driving  - start buspar (appreciate pharmacy expertise, hyponatremia is not listed with buspar)      Hyponatremia  Stable. Serum osm wnl.   Cr wnl, h/o TSH wnl. UA w/o protein, RBC 1      # postpone cataract surgery     The longitudinal plan of care for the diagnosis(es)/condition(s) as documented were addressed during this visit. Due to the added complexity in care, I will continue to support Estela in the subsequent management and with ongoing continuity of care.    See Patient Instructions    Return in about 2 weeks (around 10/21/2024) for mood, BP Recheck.    Subjective   Estela is a 69 year old, presenting for the following health issues:  Hypertension        10/7/2024     9:58 AM   Additional Questions   Roomed by Lois Stiles   Accompanied by self     HPI       Hypertension Follow-up    Do you check your blood pressure regularly outside of the clinic? Yes   Are you following a low salt diet? No  Are your blood pressures ever more than 140 on the top number (systolic) OR more   than 90 on the bottom number (diastolic), for example 140/90? Yes    - increased losartan to 50mg bid  - c/w metoprolol succinate 50mg   - clonazepam - took before coming in today     - 139/84 at home last night   - last drink of alcohol was last night   - does drink most evenings       BP Readings from Last 6 Encounters:   10/07/24 (!) 154/93   10/01/24 (!) 182/90   11/21/23 (!) 198/110   08/24/23 (!) 158/88   08/04/23 (!) 158/94   08/01/23 (!) 180/106             Review of Systems  Constitutional, HEENT, cardiovascular, pulmonary, gi and gu systems are negative, except as otherwise noted.      Objective    BP (!)  "156/100   Pulse 91   Temp 97.6  F (36.4  C) (Tympanic)   Resp 16   Ht 1.727 m (5' 8\")   Wt 62.9 kg (138 lb 11.2 oz)   SpO2 98%   BMI 21.09 kg/m    Body mass index is 21.09 kg/m .  Physical Exam  Constitutional:       General: She is not in acute distress.     Appearance: She is not ill-appearing.      Comments: Slightly drowsy   Cardiovascular:      Rate and Rhythm: Normal rate and regular rhythm.      Heart sounds: No murmur heard.  Pulmonary:      Effort: Pulmonary effort is normal. No respiratory distress.      Breath sounds: No wheezing or rales.   Neurological:      Mental Status: She is alert.   Psychiatric:         Mood and Affect: Mood is anxious.            Results for orders placed or performed in visit on 10/07/24   Sodium random urine     Status: None   Result Value Ref Range    Sodium Urine mmol/L 34 mmol/L   Creatinine random urine     Status: None   Result Value Ref Range    Creatinine Urine mg/dL 67.0 mg/dL   Osmolality urine     Status: Normal   Result Value Ref Range    Osmolality Urine 292 100 - 1,200 mmol/kg    Narrative    Reference Ranges depend on patient's hydration status and renal function.   Neonates:  mmol/kg   2 years and older, random specimens: 100-1200 mmol/kg; Greater than 850 mmol/kg after 12 hour fluid restriction  Urine/serum osmolality ratio: 2 years and older: 1.0-3.0; 3.0-4.7 after 12 hour fluid restriction   UA Macroscopic with reflex to Microscopic and Culture     Status: Abnormal    Specimen: Urine, Midstream   Result Value Ref Range    Color Urine Light Yellow Colorless, Straw, Light Yellow, Yellow    Appearance Urine Clear Clear    Glucose Urine Negative Negative mg/dL    Bilirubin Urine Negative Negative    Ketones Urine Negative Negative mg/dL    Specific Gravity Urine 1.009 1.003 - 1.035    Blood Urine Trace (A) Negative    pH Urine 6.0 4.7 - 8.0    Protein Albumin Urine Negative Negative mg/dL    Urobilinogen Urine Normal Normal, 2.0 mg/dL    Nitrite Urine " Negative Negative    Leukocyte Esterase Urine Negative Negative    Bacteria Urine Few (A) None Seen /HPF    RBC Urine 1 <=2 /HPF    WBC Urine 1 <=5 /HPF    Squamous Epithelials Urine 3 (H) <=1 /HPF    Narrative    Urine Culture not indicated             Signed Electronically by: Kassandra Prado MD

## 2024-10-02 NOTE — DISCHARGE INSTRUCTIONS
Cell number on file:    Telephone Information:   Mobile [phone]     Is it ok to leave a message at this number(s)? Yes    Dr Guadarrama completed your procedure on 12/14/2021.    Current Pain Level (0-10 Scale): 5/10  Post Pain Level (0-10):  0/10    Radiology Discharge instructions for Steroid Injection    Activity Level:     Do not do any heavy activity or exercise for 24 hours.   Do not drive for 4 hours after your injection.  Diet:   Return to your normal diet.  Medications:   If you have stopped taking your Aspirin, Coumadin/Warfarin, Ibuprofen, or any   other blood thinner for this procedure you may resume in the morning unless   your primary care provider has given you other instructions.    Diabetics may see an increase in blood sugar after steroid injections. If you are concerned about your blood sugar, please contact your family doctor.    Site Care:  Remove the bandage and bathe or shower the morning after the procedure.      Please allow two weeks to experience improvement in your pain.  If you have any further issues, please contact your provider.    Call your Primary Care Provider if you have the following (if your primary care provider is not available please seek emergency care):   Nausea with vomiting   Severe headache   Drowsiness or confusion   Redness or drainage at the injection or puncture site   Temperature over 101 degrees F   Other concerns   Worsening back pain   Stiff neck         .

## 2024-10-04 RX ORDER — MULTIVITAMIN
1 TABLET ORAL DAILY
COMMUNITY
End: 2024-10-08 | Stop reason: HOSPADM

## 2024-10-07 ENCOUNTER — LAB (OUTPATIENT)
Dept: LAB | Facility: OTHER | Age: 69
End: 2024-10-07
Payer: COMMERCIAL

## 2024-10-07 ENCOUNTER — OFFICE VISIT (OUTPATIENT)
Dept: FAMILY MEDICINE | Facility: OTHER | Age: 69
End: 2024-10-07
Attending: FAMILY MEDICINE
Payer: COMMERCIAL

## 2024-10-07 VITALS
SYSTOLIC BLOOD PRESSURE: 154 MMHG | BODY MASS INDEX: 21.02 KG/M2 | WEIGHT: 138.7 LBS | HEART RATE: 91 BPM | TEMPERATURE: 97.6 F | OXYGEN SATURATION: 98 % | DIASTOLIC BLOOD PRESSURE: 93 MMHG | RESPIRATION RATE: 16 BRPM | HEIGHT: 68 IN

## 2024-10-07 DIAGNOSIS — E87.1 HYPONATREMIA: ICD-10-CM

## 2024-10-07 DIAGNOSIS — F41.9 ANXIETY: ICD-10-CM

## 2024-10-07 DIAGNOSIS — I10 ESSENTIAL HYPERTENSION: Primary | ICD-10-CM

## 2024-10-07 LAB
ALBUMIN UR-MCNC: NEGATIVE MG/DL
APPEARANCE UR: CLEAR
BACTERIA #/AREA URNS HPF: ABNORMAL /HPF
BILIRUB UR QL STRIP: NEGATIVE
COLOR UR AUTO: ABNORMAL
CREAT UR-MCNC: 67 MG/DL
GLUCOSE UR STRIP-MCNC: NEGATIVE MG/DL
HGB UR QL STRIP: ABNORMAL
KETONES UR STRIP-MCNC: NEGATIVE MG/DL
LEUKOCYTE ESTERASE UR QL STRIP: NEGATIVE
NITRATE UR QL: NEGATIVE
OSMOLALITY UR: 292 MMOL/KG (ref 100–1200)
PH UR STRIP: 6 [PH] (ref 4.7–8)
RBC URINE: 1 /HPF
SODIUM UR-SCNC: 34 MMOL/L
SP GR UR STRIP: 1.01 (ref 1–1.03)
SQUAMOUS EPITHELIAL: 3 /HPF
UROBILINOGEN UR STRIP-MCNC: NORMAL MG/DL
WBC URINE: 1 /HPF

## 2024-10-07 PROCEDURE — 83935 ASSAY OF URINE OSMOLALITY: CPT | Mod: ZL

## 2024-10-07 PROCEDURE — G2211 COMPLEX E/M VISIT ADD ON: HCPCS | Performed by: FAMILY MEDICINE

## 2024-10-07 PROCEDURE — 81003 URINALYSIS AUTO W/O SCOPE: CPT | Mod: ZL

## 2024-10-07 PROCEDURE — 84300 ASSAY OF URINE SODIUM: CPT | Mod: ZL

## 2024-10-07 PROCEDURE — G0463 HOSPITAL OUTPT CLINIC VISIT: HCPCS | Mod: 25

## 2024-10-07 PROCEDURE — G0463 HOSPITAL OUTPT CLINIC VISIT: HCPCS

## 2024-10-07 PROCEDURE — 99214 OFFICE O/P EST MOD 30 MIN: CPT | Performed by: FAMILY MEDICINE

## 2024-10-07 PROCEDURE — 82570 ASSAY OF URINE CREATININE: CPT | Mod: ZL

## 2024-10-07 RX ORDER — AMLODIPINE BESYLATE 5 MG/1
5 TABLET ORAL DAILY
Qty: 30 TABLET | Refills: 2 | Status: SHIPPED | OUTPATIENT
Start: 2024-10-07

## 2024-10-07 RX ORDER — BUSPIRONE HYDROCHLORIDE 5 MG/1
5 TABLET ORAL 2 TIMES DAILY
Qty: 60 TABLET | Refills: 1 | Status: SHIPPED | OUTPATIENT
Start: 2024-10-07

## 2024-10-07 RX ORDER — ESCITALOPRAM OXALATE 20 MG/1
20 TABLET ORAL DAILY
Status: CANCELLED | OUTPATIENT
Start: 2024-10-07

## 2024-10-07 ASSESSMENT — PAIN SCALES - GENERAL: PAINLEVEL: NO PAIN (0)

## 2024-10-07 NOTE — OR NURSING
Received call from Arcelia at [facility] stating that patient eye surgery is canceled d/t elevated BP. Arcelia will update Caprice FREED

## 2024-10-10 NOTE — OR NURSING
Called and spoke with patient regarding elevated blood pressure.  Discussed with patient that we need to ensure her blood pressure is under good control prior to proceeding.  She has appointment with with Dr. Prado on 10/25.  Advised that she would need to be cleared by Dr. Prado to proceed.  Patient verbalized understanding and stated she would contact MD to be seen sooner.

## 2024-10-14 ENCOUNTER — TELEPHONE (OUTPATIENT)
Dept: FAMILY MEDICINE | Facility: OTHER | Age: 69
End: 2024-10-14

## 2024-10-14 NOTE — TELEPHONE ENCOUNTER
11:27 AM    Reason for Call: OVERBOOK    Patient is having the following symptoms: Patient needs to be seen  for bp check /mood follow up. Patient is wanting to be seen on 10/21 not 10/25. Please call patient back she is asking to speak to the nurse. days.    The patient is requesting an appointment for Overbook with .    Was an appointment offered for this call? No  If yes : Appointment type              Date    Preferred method for responding to this message: Telephone Call  What is your phone number ?  [phone]    If we cannot reach you directly, may we leave a detailed response at the number you provided? Yes    Can this message wait until your PCP/provider returns, if unavailable today? Provider is in    Ophelia Fish

## 2024-10-15 NOTE — TELEPHONE ENCOUNTER
Patient's  called today and wanted to let us know that the patient has been having some higher blood pressures; she saw Dr. Prado on 10/7/24 and was noted to have HTN; medications were changed at that time.  Her follow up with Dr. Prado was after her surgery date but they are going to be seeing SUE Bran on 10/21/24 or HTN recheck.

## 2024-10-16 ENCOUNTER — ANESTHESIA EVENT (OUTPATIENT)
Dept: SURGERY | Facility: HOSPITAL | Age: 69
End: 2024-10-16
Payer: COMMERCIAL

## 2024-10-16 NOTE — ANESTHESIA PREPROCEDURE EVALUATION
Anesthesia Pre-Procedure Evaluation    Patient: Estela Schafer   MRN: [id number] : 1955        Procedure : Procedure(s):  Phacoemulsification Cataract Extraction Posterior Chamber Lens Left Eye          Past Medical History:   Diagnosis Date    Asymptomatic varicose veins 2013    Basal cell carcinoma 2007    [facility] chest wall. Dr Frey    Esophageal reflux 2011    Hypertension 2006    Unspecified sinusitis (chronic) 2000      Past Surgical History:   Procedure Laterality Date    Arthroscopy right meniscal tear  2006    Dr Orozco    Arthroscopy right shoulder  2010    COLONOSCOPY  2008    Family history of colon cancer (mother). Negative. Repeat     COLONOSCOPY - HIM SCAN N/A 2016    Colonoscopy diagnostic;  Surgeon:  Gonsalo Prasad MD;  Location:  [facility]    DILATION AND CURETTAGE      Removed mass left neck  2007    Dr Cervantes    Septoplasty and sinus surgery  2001    [place] teeth removed        Allergies   Allergen Reactions    Lisinopril Cough      Social History     Tobacco Use    Smoking status: Never     Passive exposure: Never    Smokeless tobacco: Never   Substance Use Topics    Alcohol use: Yes     Comment: occasionally      Wt Readings from Last 1 Encounters:   10/07/24 62.9 kg (138 lb 11.2 oz)        Anesthesia Evaluation   Pt has had prior anesthetic. Type: MAC.    No history of anesthetic complications       ROS/MED HX  ENT/Pulmonary: Comment: Unspecified sinusitis (chronic)    (+)     AUGUSTIN risk factors,  hypertension,                                 Neurologic:  - neg neurologic ROS     Cardiovascular: Comment: Varicose veins    10/7/24 appt  BP above goal.   Discussed cutting back (or stopping) alcohol use due to it contribution to blood pressure issues  - c/w losartan 50mg bid  - c/w metoprolol  - start amlodipine    10/21/24 f/up: initial BP high, but recheck 137/88 - cleared for upcoming surgery      (+)   hypertension-range: 137/88 (10/21/24)/ -   -  - -   Taking blood thinners Pt has received instructions: Instructions Given to patient: asa - hold x 1 week.                            Previous cardiac testing   Echo: Date: Results:    Stress Test:  Date: Results:    ECG Reviewed:  Date: 10/1/24 Results:  HR 64  Sinus rhythm   Possible Left atrial enlargement   Borderline ECG     Cath:  Date: Results:      METS/Exercise Tolerance:     Hematologic: Comments: Chronic hyponatremia - stable (133) 10/7/24      Musculoskeletal:  - neg musculoskeletal ROS     GI/Hepatic:     (+) GERD (no meds),            liver disease (fatty liver),       Renal/Genitourinary:  - neg Renal ROS     Endo:  - neg endo ROS     Psychiatric/Substance Use: Comment: 1 to 2 glasses of wine, 4 nights per week  (10/1/24)    (+) psychiatric history anxiety alcohol abuse      Infectious Disease:  - neg infectious disease ROS     Malignancy: Comment: HX BCC skin on chest 2007  (+) Malignancy, History of Skin.Skin CA Remission status post.      Other:  - neg other ROS          Physical Exam    Airway  airway exam normal           Respiratory Devices and Support         Dental       (+) Completely normal teeth      Cardiovascular          Rhythm and rate: regular and normal     Pulmonary           breath sounds clear to auscultation           OUTSIDE LABS:  CBC:   Lab Results   Component Value Date    WBC 5.6 10/01/2024    WBC 6.1 08/24/2023    HGB 13.8 10/01/2024    HGB 14.1 08/24/2023    HCT 39.3 10/01/2024    HCT 41.3 08/24/2023     10/01/2024     08/24/2023     BMP:   Lab Results   Component Value Date     (L) 10/01/2024     08/24/2023    POTASSIUM 3.6 10/01/2024    POTASSIUM 3.8 08/24/2023    CHLORIDE 95 (L) 10/01/2024    CHLORIDE 98 08/24/2023    CO2 23 10/01/2024    CO2 25 08/24/2023    BUN 3.9 (L) 10/01/2024    BUN 4.9 (L) 08/24/2023    CR 0.54 10/01/2024    CR 0.48 (L) 08/24/2023     (H) 10/01/2024     (H)  "08/24/2023     COAGS:   Lab Results   Component Value Date    INR 0.92 08/24/2023     POC: No results found for: \"BGM\", \"HCG\", \"HCGS\"  HEPATIC:   Lab Results   Component Value Date    ALBUMIN 4.5 10/01/2024    PROTTOTAL 7.8 10/01/2024    ALT 28 10/01/2024    AST 44 10/01/2024    ALKPHOS 102 10/01/2024    BILITOTAL 1.3 (H) 10/01/2024     OTHER:   Lab Results   Component Value Date    LACT 1.1 03/03/2019    A1C 4.6 10/01/2024    JEN 9.6 10/01/2024    MAG 2.0 08/01/2023    TSH 2.70 08/01/2023    SED 16 03/03/2014       Anesthesia Plan    ASA Status:  2       Anesthesia Type: MAC.     - Reason for MAC: straight local not clinically adequate   Induction: Intravenous.   Maintenance: TIVA.        Consents    Anesthesia Plan(s) and associated risks, benefits, and realistic alternatives discussed. Questions answered and patient/representative(s) expressed understanding.     - Discussed: Risks, Benefits and Alternatives for BOTH SEDATION and the PROCEDURE were discussed     - Discussed with:  Patient      - Extended Intubation/Ventilatory Support Discussed: Yes.      - Patient is DNR/DNI Status: No     Use of blood products discussed: Yes.     - Discussed with: Patient.     Postoperative Care            Comments:    Other Comments: Reviewed 10/21 GILBERTO Ortiz CNP    I have reviewed the pertinent notes and labs in the chart from the past 30 days. Any updates or changes from those notes are reflected in this note.               # Hypertension: Noted on problem list                   "

## 2024-10-17 NOTE — H&P (VIEW-ONLY)
Assessment & Plan     Preop general physical exam  Please refer back to Pre op dated on 10/1/24.  We are going to update her pre op today.  No changes in Medication, family hx, social hx , health he see below for addition of hypertensive medications.She is optimized for surgery. Her ASA has been held and her Ibuprofen has been held for one week. No other concerns.     Essential hypertension  She has an acceptable blood pressure we will optimize her now for surgery.   She has readings from home as well and much better controlled there.   MARIAMA (generalized anxiety disorder)  She is going to be optimized. Has some anxiety and white coat issues.  Today is fine.             See Patient Instructions    No follow-ups on file.    Subjective   Estela is a 69 year old, presenting for the following health issues:  Anxiety, Depression, and Hypertension        10/21/2024     9:01 AM   Additional Questions   Roomed by renee guardado   Accompanied by none         10/21/2024     9:01 AM   Patient Reported Additional Medications   Patient reports taking the following new medications none     HPI     Hypertension Follow-up    Do you check your blood pressure regularly outside of the clinic? Yes   Are you following a low salt diet? No  Are your blood pressures ever more than 140 on the top number (systolic) OR more   than 90 on the bottom number (diastolic), for example 140/90? Yes    Depression and Anxiety   How are you doing with your depression since your last visit? No change  How are you doing with your anxiety since your last visit?  No change  Are you having other symptoms that might be associated with depression or anxiety? No  Have you had a significant life event? No   Do you have any concerns with your use of alcohol or other drugs? No    Social History     Tobacco Use    Smoking status: Never     Passive exposure: Never    Smokeless tobacco: Never   Vaping Use    Vaping status: Never Used   Substance Use Topics    Alcohol  "use: Yes     Comment: occasionally    Drug use: No         5/15/2018    10:00 AM 7/20/2020     8:10 AM 10/21/2024     9:03 AM   PHQ   PHQ-9 Total Score 0 0 0   Q9: Thoughts of better off dead/self-harm past 2 weeks Not at all Not at all Not at all         5/15/2018    10:00 AM 7/20/2020     8:10 AM 10/21/2024     9:04 AM   MARIAMA-7 SCORE   Total Score   5 (mild anxiety)   Total Score 2 4 5         10/21/2024     9:03 AM   Last PHQ-9   1.  Little interest or pleasure in doing things 0   2.  Feeling down, depressed, or hopeless 0   3.  Trouble falling or staying asleep, or sleeping too much 0   4.  Feeling tired or having little energy 0   5.  Poor appetite or overeating 0   6.  Feeling bad about yourself 0   7.  Trouble concentrating 0   8.  Moving slowly or restless 0   Q9: Thoughts of better off dead/self-harm past 2 weeks 0   PHQ-9 Total Score 0         10/21/2024     9:04 AM   MARIAMA-7    1. Feeling nervous, anxious, or on edge 3   2. Not being able to stop or control worrying 1   3. Worrying too much about different things 1   4. Trouble relaxing 0   5. Being so restless that it is hard to sit still 0   6. Becoming easily annoyed or irritable 0   7. Feeling afraid, as if something awful might happen 0   MARIAMA-7 Total Score 5   If you checked any problems, how difficult have they made it for you to do your work, take care of things at home, or get along with other people? Not difficult at all       Suicide Assessment Five-step Evaluation and Treatment (SAFE-T)          Review of Systems  Constitutional, HEENT, cardiovascular, pulmonary, GI, , musculoskeletal, neuro, skin, endocrine and psych systems are negative, except as otherwise noted.      Objective    BP (!) 152/91 (BP Location: Right arm, Patient Position: Sitting, Cuff Size: Adult Regular)   Pulse 90   Temp 96.9  F (36.1  C) (Tympanic)   Resp 16   Ht 1.727 m (5' 8\")   Wt 61.7 kg (136 lb 1.6 oz)   SpO2 99%   BMI 20.69 kg/m    Body mass index is 20.69 " kg/m .  Physical Exam   GENERAL: alert and no distress  EYES: Eyes grossly normal to inspection, PERRL and conjunctivae and sclerae normal  HENT: ear canals and TM's normal, nose and mouth without ulcers or lesions  NECK: no adenopathy, no asymmetry, masses, or scars  RESP: lungs clear to auscultation - no rales, rhonchi or wheezes  CV: regular rate and rhythm, normal S1 S2, no S3 or S4, no murmur, click or rub, no peripheral edema  ABDOMEN: soft, nontender, no hepatosplenomegaly, no masses and bowel sounds normal  MS: no gross musculoskeletal defects noted, no edema  SKIN: no suspicious lesions or rashes  PSYCH: mentation appears normal, affect normal/bright  LYMPH: no cervical, supraclavicular, axillary, or inguinal adenopathy    No results found for any visits on 10/21/24.        Signed Electronically by: AGGIE Palma    Answers submitted by the patient for this visit:  Patient Health Questionnaire (Submitted on 10/21/2024)  PHQ9 TOTAL SCORE: 0  Patient Health Questionnaire (G7) (Submitted on 10/21/2024)  MARIAMA 7 TOTAL SCORE: 5

## 2024-10-21 ENCOUNTER — OFFICE VISIT (OUTPATIENT)
Dept: FAMILY MEDICINE | Facility: OTHER | Age: 69
End: 2024-10-21
Attending: PHYSICIAN ASSISTANT
Payer: COMMERCIAL

## 2024-10-21 VITALS
DIASTOLIC BLOOD PRESSURE: 88 MMHG | RESPIRATION RATE: 16 BRPM | WEIGHT: 136.1 LBS | BODY MASS INDEX: 20.63 KG/M2 | HEIGHT: 68 IN | TEMPERATURE: 96.9 F | HEART RATE: 90 BPM | OXYGEN SATURATION: 99 % | SYSTOLIC BLOOD PRESSURE: 137 MMHG

## 2024-10-21 DIAGNOSIS — I10 ESSENTIAL HYPERTENSION: ICD-10-CM

## 2024-10-21 DIAGNOSIS — Z01.818 PREOP GENERAL PHYSICAL EXAM: Primary | ICD-10-CM

## 2024-10-21 DIAGNOSIS — F41.1 GAD (GENERALIZED ANXIETY DISORDER): ICD-10-CM

## 2024-10-21 PROCEDURE — 99213 OFFICE O/P EST LOW 20 MIN: CPT | Performed by: PHYSICIAN ASSISTANT

## 2024-10-21 PROCEDURE — G0463 HOSPITAL OUTPT CLINIC VISIT: HCPCS

## 2024-10-21 ASSESSMENT — ANXIETY QUESTIONNAIRES
2. NOT BEING ABLE TO STOP OR CONTROL WORRYING: SEVERAL DAYS
4. TROUBLE RELAXING: NOT AT ALL
1. FEELING NERVOUS, ANXIOUS, OR ON EDGE: NEARLY EVERY DAY
IF YOU CHECKED OFF ANY PROBLEMS ON THIS QUESTIONNAIRE, HOW DIFFICULT HAVE THESE PROBLEMS MADE IT FOR YOU TO DO YOUR WORK, TAKE CARE OF THINGS AT HOME, OR GET ALONG WITH OTHER PEOPLE: NOT DIFFICULT AT ALL
7. FEELING AFRAID AS IF SOMETHING AWFUL MIGHT HAPPEN: NOT AT ALL
3. WORRYING TOO MUCH ABOUT DIFFERENT THINGS: SEVERAL DAYS
7. FEELING AFRAID AS IF SOMETHING AWFUL MIGHT HAPPEN: NOT AT ALL
GAD7 TOTAL SCORE: 5
8. IF YOU CHECKED OFF ANY PROBLEMS, HOW DIFFICULT HAVE THESE MADE IT FOR YOU TO DO YOUR WORK, TAKE CARE OF THINGS AT HOME, OR GET ALONG WITH OTHER PEOPLE?: NOT DIFFICULT AT ALL
5. BEING SO RESTLESS THAT IT IS HARD TO SIT STILL: NOT AT ALL
6. BECOMING EASILY ANNOYED OR IRRITABLE: NOT AT ALL

## 2024-10-21 ASSESSMENT — PATIENT HEALTH QUESTIONNAIRE - PHQ9
SUM OF ALL RESPONSES TO PHQ QUESTIONS 1-9: 0
SUM OF ALL RESPONSES TO PHQ QUESTIONS 1-9: 0

## 2024-10-23 ENCOUNTER — ANESTHESIA (OUTPATIENT)
Dept: SURGERY | Facility: HOSPITAL | Age: 69
End: 2024-10-23
Payer: COMMERCIAL

## 2024-10-23 ENCOUNTER — HOSPITAL ENCOUNTER (OUTPATIENT)
Facility: HOSPITAL | Age: 69
Discharge: HOME | End: 2024-10-23
Attending: OPHTHALMOLOGY | Admitting: OPHTHALMOLOGY
Payer: COMMERCIAL

## 2024-10-23 VITALS
OXYGEN SATURATION: 97 % | HEIGHT: 68 IN | TEMPERATURE: 98.2 F | RESPIRATION RATE: 16 BRPM | DIASTOLIC BLOOD PRESSURE: 74 MMHG | SYSTOLIC BLOOD PRESSURE: 117 MMHG | WEIGHT: 130 LBS | BODY MASS INDEX: 19.7 KG/M2 | HEART RATE: 74 BPM

## 2024-10-23 PROCEDURE — 250N000011 HC RX IP 250 OP 636: Performed by: NURSE ANESTHETIST, CERTIFIED REGISTERED

## 2024-10-23 PROCEDURE — 370N000017 HC ANESTHESIA TECHNICAL FEE, PER MIN: Performed by: OPHTHALMOLOGY

## 2024-10-23 PROCEDURE — 250N000009 HC RX 250: Performed by: OPHTHALMOLOGY

## 2024-10-23 PROCEDURE — 999N000141 HC STATISTIC PRE-PROCEDURE NURSING ASSESSMENT: Performed by: OPHTHALMOLOGY

## 2024-10-23 PROCEDURE — 360N000076 HC SURGERY LEVEL 3, PER MIN: Performed by: OPHTHALMOLOGY

## 2024-10-23 PROCEDURE — V2788 PRESBYOPIA-CORRECT FUNCTION: HCPCS | Performed by: OPHTHALMOLOGY

## 2024-10-23 PROCEDURE — 250N000011 HC RX IP 250 OP 636: Performed by: OPHTHALMOLOGY

## 2024-10-23 PROCEDURE — 272N000001 HC OR GENERAL SUPPLY STERILE: Performed by: OPHTHALMOLOGY

## 2024-10-23 PROCEDURE — 66984 XCAPSL CTRC RMVL W/O ECP: CPT | Performed by: NURSE ANESTHETIST, CERTIFIED REGISTERED

## 2024-10-23 PROCEDURE — 710N000012 HC RECOVERY PHASE 2, PER MINUTE: Performed by: OPHTHALMOLOGY

## 2024-10-23 RX ORDER — PROPARACAINE HYDROCHLORIDE 5 MG/ML
1 SOLUTION/ DROPS OPHTHALMIC ONCE
Status: COMPLETED | OUTPATIENT
Start: 2024-10-23 | End: 2024-10-23

## 2024-10-23 RX ORDER — LIDOCAINE HYDROCHLORIDE 10 MG/ML
INJECTION, SOLUTION EPIDURAL; INFILTRATION; INTRACAUDAL; PERINEURAL PRN
Status: DISCONTINUED | OUTPATIENT
Start: 2024-10-23 | End: 2024-10-23 | Stop reason: HOSPADM

## 2024-10-23 RX ORDER — PROPARACAINE HYDROCHLORIDE 5 MG/ML
1 SOLUTION/ DROPS OPHTHALMIC
Status: COMPLETED | OUTPATIENT
Start: 2024-10-23 | End: 2024-10-23

## 2024-10-23 RX ORDER — PHENYLEPHRINE HYDROCHLORIDE 100 MG/ML
1 SOLUTION/ DROPS OPHTHALMIC
Status: DISCONTINUED | OUTPATIENT
Start: 2024-10-23 | End: 2024-10-23 | Stop reason: HOSPADM

## 2024-10-23 RX ORDER — ONDANSETRON 4 MG/1
4 TABLET, ORALLY DISINTEGRATING ORAL EVERY 30 MIN PRN
Status: DISCONTINUED | OUTPATIENT
Start: 2024-10-23 | End: 2024-10-23 | Stop reason: HOSPADM

## 2024-10-23 RX ORDER — LIDOCAINE 40 MG/G
CREAM TOPICAL
Status: DISCONTINUED | OUTPATIENT
Start: 2024-10-23 | End: 2024-10-23 | Stop reason: HOSPADM

## 2024-10-23 RX ORDER — MOXIFLOXACIN 5 MG/ML
1 SOLUTION/ DROPS OPHTHALMIC
Status: COMPLETED | OUTPATIENT
Start: 2024-10-23 | End: 2024-10-23

## 2024-10-23 RX ORDER — PILOCARPINE HYDROCHLORIDE 10 MG/ML
SOLUTION/ DROPS OPHTHALMIC PRN
Status: DISCONTINUED | OUTPATIENT
Start: 2024-10-23 | End: 2024-10-23 | Stop reason: HOSPADM

## 2024-10-23 RX ORDER — NALOXONE HYDROCHLORIDE 0.4 MG/ML
0.1 INJECTION, SOLUTION INTRAMUSCULAR; INTRAVENOUS; SUBCUTANEOUS
Status: DISCONTINUED | OUTPATIENT
Start: 2024-10-23 | End: 2024-10-23 | Stop reason: HOSPADM

## 2024-10-23 RX ORDER — DEXAMETHASONE SODIUM PHOSPHATE 10 MG/ML
4 INJECTION, SOLUTION INTRAMUSCULAR; INTRAVENOUS
Status: DISCONTINUED | OUTPATIENT
Start: 2024-10-23 | End: 2024-10-23 | Stop reason: HOSPADM

## 2024-10-23 RX ORDER — LEVOBUNOLOL HYDROCHLORIDE 5 MG/ML
SOLUTION/ DROPS OPHTHALMIC PRN
Status: DISCONTINUED | OUTPATIENT
Start: 2024-10-23 | End: 2024-10-23 | Stop reason: HOSPADM

## 2024-10-23 RX ORDER — LIDOCAINE HYDROCHLORIDE 20 MG/ML
JELLY TOPICAL ONCE
Status: COMPLETED | OUTPATIENT
Start: 2024-10-23 | End: 2024-10-23

## 2024-10-23 RX ORDER — SODIUM CHLORIDE, SODIUM LACTATE, POTASSIUM CHLORIDE, CALCIUM CHLORIDE 600; 310; 30; 20 MG/100ML; MG/100ML; MG/100ML; MG/100ML
INJECTION, SOLUTION INTRAVENOUS CONTINUOUS
Status: DISCONTINUED | OUTPATIENT
Start: 2024-10-23 | End: 2024-10-23 | Stop reason: HOSPADM

## 2024-10-23 RX ORDER — FENTANYL CITRATE 50 UG/ML
INJECTION, SOLUTION INTRAMUSCULAR; INTRAVENOUS PRN
Status: DISCONTINUED | OUTPATIENT
Start: 2024-10-23 | End: 2024-10-23

## 2024-10-23 RX ORDER — ONDANSETRON 2 MG/ML
4 INJECTION INTRAMUSCULAR; INTRAVENOUS EVERY 30 MIN PRN
Status: DISCONTINUED | OUTPATIENT
Start: 2024-10-23 | End: 2024-10-23 | Stop reason: HOSPADM

## 2024-10-23 RX ORDER — CYCLOPENTOLATE HYDROCHLORIDE 20 MG/ML
1 SOLUTION/ DROPS OPHTHALMIC ONCE
Status: COMPLETED | OUTPATIENT
Start: 2024-10-23 | End: 2024-10-23

## 2024-10-23 RX ORDER — MOXIFLOXACIN 5 MG/ML
SOLUTION/ DROPS OPHTHALMIC PRN
Status: DISCONTINUED | OUTPATIENT
Start: 2024-10-23 | End: 2024-10-23 | Stop reason: HOSPADM

## 2024-10-23 RX ORDER — TETRACAINE HYDROCHLORIDE 5 MG/ML
SOLUTION OPHTHALMIC PRN
Status: DISCONTINUED | OUTPATIENT
Start: 2024-10-23 | End: 2024-10-23 | Stop reason: HOSPADM

## 2024-10-23 RX ORDER — CYCLOPENTOLAT/TROPIC/PHENYLEPH 1%-1%-2.5%
1 DROPS (EA) OPHTHALMIC (EYE)
Status: COMPLETED | OUTPATIENT
Start: 2024-10-23 | End: 2024-10-23

## 2024-10-23 RX ORDER — ACETAMINOPHEN 325 MG/1
650 TABLET ORAL
Status: DISCONTINUED | OUTPATIENT
Start: 2024-10-23 | End: 2024-10-23 | Stop reason: HOSPADM

## 2024-10-23 RX ADMIN — MOXIFLOXACIN OPHTHALMIC SOLUTION 1 DROP: 5 SOLUTION/ DROPS OPHTHALMIC at 08:07

## 2024-10-23 RX ADMIN — MOXIFLOXACIN OPHTHALMIC SOLUTION 1 DROP: 5 SOLUTION/ DROPS OPHTHALMIC at 08:11

## 2024-10-23 RX ADMIN — LIDOCAINE HYDROCHLORIDE: 20 JELLY TOPICAL at 08:23

## 2024-10-23 RX ADMIN — FENTANYL CITRATE 50 MCG: 50 INJECTION INTRAMUSCULAR; INTRAVENOUS at 10:20

## 2024-10-23 RX ADMIN — MOXIFLOXACIN OPHTHALMIC SOLUTION 1 DROP: 5 SOLUTION/ DROPS OPHTHALMIC at 08:15

## 2024-10-23 RX ADMIN — PROPARACAINE HYDROCHLORIDE 1 DROP: 5 SOLUTION/ DROPS OPHTHALMIC at 08:21

## 2024-10-23 RX ADMIN — Medication 1 DROP: at 08:22

## 2024-10-23 RX ADMIN — CYCLOPENTOLATE HYDROCHLORIDE 1 DROP: 20 SOLUTION/ DROPS OPHTHALMIC at 07:57

## 2024-10-23 RX ADMIN — MIDAZOLAM 2 MG: 1 INJECTION INTRAMUSCULAR; INTRAVENOUS at 10:13

## 2024-10-23 RX ADMIN — Medication 1 DROP: at 08:03

## 2024-10-23 RX ADMIN — PROPARACAINE HYDROCHLORIDE 1 DROP: 5 SOLUTION/ DROPS OPHTHALMIC at 07:55

## 2024-10-23 ASSESSMENT — ACTIVITIES OF DAILY LIVING (ADL)
ADLS_ACUITY_SCORE: 0

## 2024-10-23 NOTE — OP NOTE
[facility]  Ophthalmology Full Operative Note    Pre-operative diagnosis: Nuclear sclerotic cataract of left eye [H25.12], Presbyopia left eye, Astigmatism left eye   Post-operative diagnosis Same   Procedure: Procedure(s):  Phacoemulsification Cataract Extraction Posterior Chamber Lens Left Eye   Surgeon: Toño White MD   Assistants(s):    Anesthesia: MAC with Local    Estimated blood loss: None    Total IV fluids: (See anesthesia record)   Specimens: None   Implants: 22/+3.75 CCWTT6 PanOptix Toric   Findings:    Complications: None   Condition: Stable   Comments:       PROCEDURAL ANESTHESIA:     Topical/MAC.  Lidocaine 2% jelly topically and Lidocaine 1% preservative-free intracamerally.     PROCEDURE:  The patient was brought to the Operating Room and prepped and draped in a sterile manner.  A wire lid speculum was placed.  A paracentesis was created and 1% Lidocaine was instilled in the anterior chamber.  The anterior chamber was then filled with Amvisc viscoelastic.  A clear cornea temporal wound was created using a 2.8 mm keratome.  A cystotome was used to initiate a flap in the anterior capsule and a Utrata forceps was used to create a continuous tear capsulorhexis.  Hydrodissection was performed.  The phacoemulsification tip was inserted into the eye and the nucleus and epinucleus were removed using a divide and conquer technique.  The residual cortex was removed using the I/A handpiece.  The anterior chamber was then refilled with viscoelastic and the wound was enlarged with the keratome.  The intraocular lens, 22/+3.75 diopter, Model CCWTT6, was placed into the injector and injected into the capsular bag. The toric lens was then placed at 45 degrees using Owusu ruler/ToriCam marks. It was checked to make sure that it was central and stable.  Residual viscoelastic was removed using the I/A.  The anterior chamber was refilled with BSS.  The wounds were hydrated with BSS and were noted to be  watertight with no suture necessary.  Topical pilocarpine 1%, Betagan, and Vigamox was applied.  A hard shield was placed.     The patient tolerated the procedure well and was sent to the Recovery Room in satisfactory condition.

## 2024-10-23 NOTE — INTERVAL H&P NOTE
I have reviewed the surgical (or preoperative) H&P that is linked to this encounter, and examined the patient. There are no significant changes.  Toño White MD      Clinical Conditions Present on Arrival:  Clinically Significant Risk Factors Present on Admission

## 2024-10-23 NOTE — DISCHARGE INSTRUCTIONS
After Anesthesia (Sleep Medicine)  What should I do after anesthesia?  You should rest and relax for the next 24 hours. Avoid risky or difficult (strenuous) activity. A responsible adult should stay with you overnight.  Don't drive or use any heavy equipment for 24 hours. Even if you feel normal, your reactions may be affected by the sleep medicine given to you.  Don't drink alcohol or make any important decisions for 24 hours.  Slowly get back to your regular diet, as you feel able.  How should I expect to feel?  It's normal to feel dizzy, light-headed, or faint for up to a full day after anesthesia or while taking pain medicine. If this happens:   Sit down for a few minutes before standing.  Have someone help you when you get up to walk or use the bathroom.  If you have nausea (feel sick to your stomach) or vomit (throw up):   Drink clear liquids (such as apple juice, ginger ale, broth, or 7UP) until you feel better.  If you feel sick to your stomach, or you keep vomiting for 24 hours, please call the doctor.  What else should I know?  You might have a dry mouth, sore throat, muscle aches, or trouble sleeping. These should go away after 24 hours.  Please contact your doctor if you have any other symptoms that concern you, such as fever, pain, bleeding, fluid drainage, swelling, or headache, or if it's been over 8 to 10 hours and you still aren't able to pee (urinate).  If you have a history of sleep apnea, it's very important to use your CPAP machine for the next 24 hours when you nap or sleep.   For informational purposes only. Not to replace the advice of your health care provider. Copyright   2023 [employer]. All rights reserved. Clinically reviewed by Christiano Farfan MD. [email or web address] [id number] - REV 09/23.

## 2024-10-23 NOTE — ANESTHESIA POSTPROCEDURE EVALUATION
Patient: Estela Schafer    Procedure: Procedure(s):  Phacoemulsification Cataract Extraction Posterior Chamber Lens Left Eye       Anesthesia Type:  MAC    Note:  Disposition: Outpatient   Postop Pain Control: Uneventful            Sign Out: Well controlled pain   PONV: No   Neuro/Psych: Uneventful            Sign Out: Acceptable/Baseline neuro status   Airway/Respiratory: Uneventful            Sign Out: Acceptable/Baseline resp. status   CV/Hemodynamics: Uneventful            Sign Out: Acceptable CV status; No obvious hypovolemia; No obvious fluid overload   Other NRE: NONE   DID A NON-ROUTINE EVENT OCCUR? No           Last vitals:  Vitals Value Taken Time   /74 10/23/24 1052   Temp 98.2  F (36.8  C) 10/23/24 1052   Pulse 74 10/23/24 1052   Resp 16 10/23/24 1052   SpO2 98 % 10/23/24 1109   Vitals shown include unfiled device data.    Electronically Signed By: GILBERTO ALAN CRNA  October 23, 2024  12:13 PM

## 2024-10-23 NOTE — OR NURSING
Patient doing very well.  VSS; states pain is minimal.  Dr. White saw patient prior to discharge.  Discharge instructions reviewed with patient and spouse; both verbalize understanding.  Discharge home.    Patient and responsible adult given discharge instructions with no questions regarding instructions. Eulogio score 19. Pain level 1-2/10.  Discharged from unit via ambulatory. Patient discharged to home with .

## 2024-10-23 NOTE — ANESTHESIA CARE TRANSFER NOTE
Patient: Estela Schafer    Procedure: Procedure(s):  Phacoemulsification Cataract Extraction Posterior Chamber Lens Left Eye       Diagnosis: Nuclear sclerotic cataract of left eye [H25.12]  Diagnosis Additional Information: No value filed.    Anesthesia Type:   MAC     Note:    Oropharynx: oropharynx clear of all foreign objects and spontaneously breathing  Level of Consciousness: awake  Oxygen Supplementation: room air    Independent Airway: airway patency satisfactory and stable  Dentition: dentition unchanged  Vital Signs Stable: post-procedure vital signs reviewed and stable  Report to RN Given: handoff report given  Patient transferred to: Phase II    Handoff Report: Identifed the Patient, Identified the Reponsible Provider, Reviewed the pertinent medical history, Discussed the surgical course, Reviewed Intra-OP anesthesia mangement and issues during anesthesia, Set expectations for post-procedure period and Allowed opportunity for questions and acknowledgement of understanding  Vitals:  Vitals Value Taken Time   BP     Temp     Pulse     Resp     SpO2 96 % 10/23/24 1047   Vitals shown include unfiled device data.    Electronically Signed By: GILBERTO Patton CRNA  October 23, 2024  10:48 AM

## 2024-11-06 ENCOUNTER — ANESTHESIA EVENT (OUTPATIENT)
Dept: SURGERY | Facility: HOSPITAL | Age: 69
End: 2024-11-06
Payer: COMMERCIAL

## 2024-11-06 NOTE — ANESTHESIA PREPROCEDURE EVALUATION
Anesthesia Pre-Procedure Evaluation    Patient: Estela Schafer   MRN: [id number] : 1955        Procedure : Procedure(s):  Phacoemulsification Cataract Extraction Posterior Chamber Lens Right Eye          Past Medical History:   Diagnosis Date     Asymptomatic varicose veins 2013     Basal cell carcinoma 2007    [facility] chest wall. Dr Frey     Esophageal reflux 2011     Hypertension 2006     Unspecified sinusitis (chronic) 2000      Past Surgical History:   Procedure Laterality Date     Arthroscopy right meniscal tear  2006    Dr Orozco     Arthroscopy right shoulder  2010     COLONOSCOPY  2008    Family history of colon cancer (mother). Negative. Repeat      COLONOSCOPY - HIM SCAN N/A 2016    Colonoscopy diagnostic;  Surgeon:  Gonsalo Prasad MD;  Location:  [facility]  ST     DILATION AND CURETTAGE       PHACOEMULSIFICATION WITH STANDARD INTRAOCULAR LENS IMPLANT Left 10/23/2024    Procedure: Phacoemulsification Cataract Extraction Posterior Chamber Lens Left Eye;  Surgeon: Toño White MD;  Location: [facility] OR     Removed mass left neck  2007    Dr Cervantes     Septoplasty and sinus surgery  2001     [place] teeth removed        Allergies   Allergen Reactions     Lisinopril Cough      Social History     Tobacco Use     Smoking status: Never     Passive exposure: Never     Smokeless tobacco: Never   Substance Use Topics     Alcohol use: Yes     Comment: 4-6 times a week; last drink 2 days ago      Wt Readings from Last 1 Encounters:   10/23/24 59 kg (130 lb)        Anesthesia Evaluation   Pt has had prior anesthetic. Type: MAC.    No history of anesthetic complications       ROS/MED HX  ENT/Pulmonary: Comment: Unspecified sinusitis (chronic)    (+)     AUGUSTIN risk factors,  hypertension,                                 Neurologic:  - neg neurologic ROS     Cardiovascular: Comment: Varicose veins    10/7/24 appt  BP above goal.    Discussed cutting back (or stopping) alcohol use due to it contribution to blood pressure issues  - c/w losartan 50mg bid  - c/w metoprolol  - start amlodipine    10/21/24 f/up: initial BP high, but recheck 137/88 - cleared for upcoming surgery      (+)  hypertension-range: 137/88 (10/21/24)/ -   -  - -   Taking blood thinners Pt has received instructions: Instructions Given to patient: asa - hold x 1 week.                            Previous cardiac testing   Echo: Date: Results:    Stress Test:  Date: Results:    ECG Reviewed:  Date: 10/1/24 Results:  HR 64  Sinus rhythm   Possible Left atrial enlargement   Borderline ECG     Cath:  Date: Results:      METS/Exercise Tolerance:     Hematologic: Comments: Chronic hyponatremia - stable (133) 10/7/24      Musculoskeletal:  - neg musculoskeletal ROS     GI/Hepatic:     (+) GERD (no meds),            liver disease (fatty liver),       Renal/Genitourinary:  - neg Renal ROS     Endo:  - neg endo ROS     Psychiatric/Substance Use: Comment: 1 to 2 glasses of wine, 4 nights per week  (10/1/24)    (+) psychiatric history anxiety alcohol abuse      Infectious Disease:  - neg infectious disease ROS     Malignancy: Comment: HX BCC skin on chest 2007  (+) Malignancy, History of Skin.Skin CA Remission status post.      Other:  - neg other ROS          Physical Exam    Airway        Mallampati: II   TM distance: > 3 FB   Neck ROM: full   Mouth opening: > 3 cm    Respiratory Devices and Support         Dental       (+) Modest Abnormalities - crowns, retainers, 1 or 2 missing teeth      Cardiovascular   cardiovascular exam normal       Rhythm and rate: regular and normal     Pulmonary   pulmonary exam normal        breath sounds clear to auscultation       OUTSIDE LABS:  CBC:   Lab Results   Component Value Date    WBC 5.6 10/01/2024    WBC 6.1 08/24/2023    HGB 13.8 10/01/2024    HGB 14.1 08/24/2023    HCT 39.3 10/01/2024    HCT 41.3 08/24/2023     10/01/2024      "08/24/2023     BMP:   Lab Results   Component Value Date     (L) 10/01/2024     08/24/2023    POTASSIUM 3.6 10/01/2024    POTASSIUM 3.8 08/24/2023    CHLORIDE 95 (L) 10/01/2024    CHLORIDE 98 08/24/2023    CO2 23 10/01/2024    CO2 25 08/24/2023    BUN 3.9 (L) 10/01/2024    BUN 4.9 (L) 08/24/2023    CR 0.54 10/01/2024    CR 0.48 (L) 08/24/2023     (H) 10/01/2024     (H) 08/24/2023     COAGS:   Lab Results   Component Value Date    INR 0.92 08/24/2023     POC: No results found for: \"BGM\", \"HCG\", \"HCGS\"  HEPATIC:   Lab Results   Component Value Date    ALBUMIN 4.5 10/01/2024    PROTTOTAL 7.8 10/01/2024    ALT 28 10/01/2024    AST 44 10/01/2024    ALKPHOS 102 10/01/2024    BILITOTAL 1.3 (H) 10/01/2024     OTHER:   Lab Results   Component Value Date    LACT 1.1 03/03/2019    A1C 4.6 10/01/2024    JEN 9.6 10/01/2024    MAG 2.0 08/01/2023    TSH 2.70 08/01/2023    SED 16 03/03/2014       Anesthesia Plan    ASA Status:  2    NPO Status:  NPO Appropriate    Anesthesia Type: MAC.              Consents    Anesthesia Plan(s) and associated risks, benefits, and realistic alternatives discussed. Questions answered and patient/representative(s) expressed understanding.     - Discussed:     - Discussed with:  Patient            Postoperative Care            Comments:    Other Comments: HP 10/21 Shant  Prior eye done 10/23/24 had 2Versed 50 fent  Patient very nervous, wanting more sedation than previous eye    Risks and benefits of MAC anesthetic discussed including dental damage, aspiration, loss of airway, conversion to general anesthetic, CV complications, MI, stroke, death. Pt wishes to proceed.            Kina Kwong, APRN CNP    I have reviewed the pertinent notes and labs in the chart from the past 30 days. Any updates or changes from those notes are reflected in this note.               # Hypertension: Noted on problem list                     "

## 2024-11-14 ENCOUNTER — HOSPITAL ENCOUNTER (OUTPATIENT)
Facility: HOSPITAL | Age: 69
Discharge: HOME | End: 2024-11-14
Attending: OPHTHALMOLOGY | Admitting: OPHTHALMOLOGY
Payer: COMMERCIAL

## 2024-11-14 ENCOUNTER — ANESTHESIA (OUTPATIENT)
Dept: SURGERY | Facility: HOSPITAL | Age: 69
End: 2024-11-14
Payer: COMMERCIAL

## 2024-11-14 VITALS
HEART RATE: 76 BPM | DIASTOLIC BLOOD PRESSURE: 83 MMHG | TEMPERATURE: 97.8 F | HEIGHT: 68 IN | RESPIRATION RATE: 18 BRPM | BODY MASS INDEX: 21.04 KG/M2 | OXYGEN SATURATION: 96 % | WEIGHT: 138.8 LBS | SYSTOLIC BLOOD PRESSURE: 130 MMHG

## 2024-11-14 PROCEDURE — 999N000141 HC STATISTIC PRE-PROCEDURE NURSING ASSESSMENT: Performed by: OPHTHALMOLOGY

## 2024-11-14 PROCEDURE — 250N000011 HC RX IP 250 OP 636: Performed by: NURSE ANESTHETIST, CERTIFIED REGISTERED

## 2024-11-14 PROCEDURE — V2788 PRESBYOPIA-CORRECT FUNCTION: HCPCS | Performed by: OPHTHALMOLOGY

## 2024-11-14 PROCEDURE — 250N000009 HC RX 250: Performed by: OPHTHALMOLOGY

## 2024-11-14 PROCEDURE — 272N000001 HC OR GENERAL SUPPLY STERILE: Performed by: OPHTHALMOLOGY

## 2024-11-14 PROCEDURE — 250N000011 HC RX IP 250 OP 636: Performed by: OPHTHALMOLOGY

## 2024-11-14 PROCEDURE — 250N000013 HC RX MED GY IP 250 OP 250 PS 637: Performed by: OPHTHALMOLOGY

## 2024-11-14 PROCEDURE — 360N000076 HC SURGERY LEVEL 3, PER MIN: Performed by: OPHTHALMOLOGY

## 2024-11-14 PROCEDURE — 710N000012 HC RECOVERY PHASE 2, PER MINUTE: Performed by: OPHTHALMOLOGY

## 2024-11-14 PROCEDURE — 370N000017 HC ANESTHESIA TECHNICAL FEE, PER MIN: Performed by: OPHTHALMOLOGY

## 2024-11-14 DEVICE — IMPLANTABLE DEVICE: Type: IMPLANTABLE DEVICE | Site: EYE | Status: FUNCTIONAL

## 2024-11-14 RX ORDER — LIDOCAINE 40 MG/G
CREAM TOPICAL
Status: DISCONTINUED | OUTPATIENT
Start: 2024-11-14 | End: 2024-11-14 | Stop reason: HOSPADM

## 2024-11-14 RX ORDER — DEXAMETHASONE SODIUM PHOSPHATE 10 MG/ML
4 INJECTION, SOLUTION INTRAMUSCULAR; INTRAVENOUS
Status: DISCONTINUED | OUTPATIENT
Start: 2024-11-14 | End: 2024-11-14 | Stop reason: HOSPADM

## 2024-11-14 RX ORDER — CYCLOPENTOLAT/TROPIC/PHENYLEPH 1%-1%-2.5%
1 DROPS (EA) OPHTHALMIC (EYE)
Status: COMPLETED | OUTPATIENT
Start: 2024-11-14 | End: 2024-11-14

## 2024-11-14 RX ORDER — NALOXONE HYDROCHLORIDE 0.4 MG/ML
0.1 INJECTION, SOLUTION INTRAMUSCULAR; INTRAVENOUS; SUBCUTANEOUS
Status: DISCONTINUED | OUTPATIENT
Start: 2024-11-14 | End: 2024-11-14 | Stop reason: HOSPADM

## 2024-11-14 RX ORDER — MOXIFLOXACIN 5 MG/ML
1 SOLUTION/ DROPS OPHTHALMIC
Status: COMPLETED | OUTPATIENT
Start: 2024-11-14 | End: 2024-11-14

## 2024-11-14 RX ORDER — ACETAMINOPHEN 325 MG/1
650 TABLET ORAL
Status: COMPLETED | OUTPATIENT
Start: 2024-11-14 | End: 2024-11-14

## 2024-11-14 RX ORDER — PROPARACAINE HYDROCHLORIDE 5 MG/ML
1 SOLUTION/ DROPS OPHTHALMIC
Status: COMPLETED | OUTPATIENT
Start: 2024-11-14 | End: 2024-11-14

## 2024-11-14 RX ORDER — TETRACAINE HYDROCHLORIDE 5 MG/ML
SOLUTION OPHTHALMIC PRN
Status: DISCONTINUED | OUTPATIENT
Start: 2024-11-14 | End: 2024-11-14 | Stop reason: HOSPADM

## 2024-11-14 RX ORDER — ONDANSETRON 2 MG/ML
4 INJECTION INTRAMUSCULAR; INTRAVENOUS EVERY 30 MIN PRN
Status: DISCONTINUED | OUTPATIENT
Start: 2024-11-14 | End: 2024-11-14 | Stop reason: HOSPADM

## 2024-11-14 RX ORDER — SODIUM CHLORIDE, SODIUM LACTATE, POTASSIUM CHLORIDE, CALCIUM CHLORIDE 600; 310; 30; 20 MG/100ML; MG/100ML; MG/100ML; MG/100ML
INJECTION, SOLUTION INTRAVENOUS CONTINUOUS
Status: DISCONTINUED | OUTPATIENT
Start: 2024-11-14 | End: 2024-11-14 | Stop reason: HOSPADM

## 2024-11-14 RX ORDER — PILOCARPINE HYDROCHLORIDE 10 MG/ML
SOLUTION/ DROPS OPHTHALMIC PRN
Status: DISCONTINUED | OUTPATIENT
Start: 2024-11-14 | End: 2024-11-14 | Stop reason: HOSPADM

## 2024-11-14 RX ORDER — LEVOBUNOLOL HYDROCHLORIDE 5 MG/ML
SOLUTION/ DROPS OPHTHALMIC PRN
Status: DISCONTINUED | OUTPATIENT
Start: 2024-11-14 | End: 2024-11-14 | Stop reason: HOSPADM

## 2024-11-14 RX ORDER — PHENYLEPHRINE HYDROCHLORIDE 100 MG/ML
1 SOLUTION/ DROPS OPHTHALMIC
Status: DISCONTINUED | OUTPATIENT
Start: 2024-11-14 | End: 2024-11-14 | Stop reason: HOSPADM

## 2024-11-14 RX ORDER — LIDOCAINE HYDROCHLORIDE 10 MG/ML
INJECTION, SOLUTION EPIDURAL; INFILTRATION; INTRACAUDAL; PERINEURAL PRN
Status: DISCONTINUED | OUTPATIENT
Start: 2024-11-14 | End: 2024-11-14 | Stop reason: HOSPADM

## 2024-11-14 RX ORDER — PROPARACAINE HYDROCHLORIDE 5 MG/ML
1 SOLUTION/ DROPS OPHTHALMIC ONCE
Status: COMPLETED | OUTPATIENT
Start: 2024-11-14 | End: 2024-11-14

## 2024-11-14 RX ORDER — ONDANSETRON 4 MG/1
4 TABLET, ORALLY DISINTEGRATING ORAL EVERY 30 MIN PRN
Status: DISCONTINUED | OUTPATIENT
Start: 2024-11-14 | End: 2024-11-14 | Stop reason: HOSPADM

## 2024-11-14 RX ORDER — FENTANYL CITRATE 50 UG/ML
INJECTION, SOLUTION INTRAMUSCULAR; INTRAVENOUS PRN
Status: DISCONTINUED | OUTPATIENT
Start: 2024-11-14 | End: 2024-11-14

## 2024-11-14 RX ORDER — MOXIFLOXACIN 5 MG/ML
SOLUTION/ DROPS OPHTHALMIC PRN
Status: DISCONTINUED | OUTPATIENT
Start: 2024-11-14 | End: 2024-11-14 | Stop reason: HOSPADM

## 2024-11-14 RX ORDER — LIDOCAINE HYDROCHLORIDE 20 MG/ML
JELLY TOPICAL ONCE
Status: COMPLETED | OUTPATIENT
Start: 2024-11-14 | End: 2024-11-14

## 2024-11-14 RX ORDER — CYCLOPENTOLATE HYDROCHLORIDE 20 MG/ML
1 SOLUTION/ DROPS OPHTHALMIC ONCE
Status: COMPLETED | OUTPATIENT
Start: 2024-11-14 | End: 2024-11-14

## 2024-11-14 RX ADMIN — Medication 1 DROP: at 07:17

## 2024-11-14 RX ADMIN — PROPARACAINE HYDROCHLORIDE 1 DROP: 5 SOLUTION/ DROPS OPHTHALMIC at 07:09

## 2024-11-14 RX ADMIN — MOXIFLOXACIN 1 DROP: 5 SOLUTION/ DROPS OPHTHALMIC at 07:25

## 2024-11-14 RX ADMIN — PROPARACAINE HYDROCHLORIDE 1 DROP: 5 SOLUTION/ DROPS OPHTHALMIC at 07:29

## 2024-11-14 RX ADMIN — FENTANYL CITRATE 25 MCG: 50 INJECTION INTRAMUSCULAR; INTRAVENOUS at 08:30

## 2024-11-14 RX ADMIN — FENTANYL CITRATE 25 MCG: 50 INJECTION INTRAMUSCULAR; INTRAVENOUS at 08:27

## 2024-11-14 RX ADMIN — LIDOCAINE HYDROCHLORIDE: 20 JELLY TOPICAL at 07:31

## 2024-11-14 RX ADMIN — Medication 1 DROP: at 07:30

## 2024-11-14 RX ADMIN — MIDAZOLAM 2 MG: 1 INJECTION INTRAMUSCULAR; INTRAVENOUS at 08:18

## 2024-11-14 RX ADMIN — CYCLOPENTOLATE HYDROCHLORIDE 1 DROP: 20 SOLUTION/ DROPS OPHTHALMIC at 07:12

## 2024-11-14 RX ADMIN — FENTANYL CITRATE 50 MCG: 50 INJECTION INTRAMUSCULAR; INTRAVENOUS at 08:22

## 2024-11-14 RX ADMIN — MOXIFLOXACIN 1 DROP: 5 SOLUTION/ DROPS OPHTHALMIC at 07:28

## 2024-11-14 RX ADMIN — MOXIFLOXACIN 1 DROP: 5 SOLUTION/ DROPS OPHTHALMIC at 07:19

## 2024-11-14 RX ADMIN — ACETAMINOPHEN 650 MG: 325 TABLET, FILM COATED ORAL at 07:07

## 2024-11-14 ASSESSMENT — ACTIVITIES OF DAILY LIVING (ADL)
ADLS_ACUITY_SCORE: 0

## 2024-11-14 NOTE — OR NURSING
Patient and responsible adult given discharge instructions with no questions regarding instructions. Eulogio score 20/20. Pain level 0/10.  Discharged from unit via ambulation. Patient discharged to home with spouse.

## 2024-11-14 NOTE — H&P
Reviewed Surgery plan in chart and with the patient.Pre-op H&P still valid for the second eye surgery Plan is to proceed with cataract surgery Right eye today. Patient agrees.  Toño White MD

## 2024-11-14 NOTE — ANESTHESIA POSTPROCEDURE EVALUATION
Patient: Estela Schafer    Procedure: Procedure(s):  Phacoemulsification Cataract Extraction Posterior Chamber Lens Right Eye       Anesthesia Type:  MAC    Note:  Disposition: Outpatient   Postop Pain Control: Uneventful            Sign Out: Well controlled pain   PONV: No   Neuro/Psych: Uneventful            Sign Out: Acceptable/Baseline neuro status   Airway/Respiratory: Uneventful            Sign Out: Acceptable/Baseline resp. status   CV/Hemodynamics: Uneventful            Sign Out: Acceptable CV status; No obvious hypovolemia; No obvious fluid overload   Other NRE: NONE   DID A NON-ROUTINE EVENT OCCUR? No       Last vitals:  Vitals Value Taken Time   /83 11/14/24 0910   Temp 97.8  F (36.6  C) 11/14/24 0912   Pulse 76 11/14/24 0910   Resp 18 11/14/24 0910   SpO2 98 % 11/14/24 0910   Vitals shown include unfiled device data.    Electronically Signed By: GILBERTO Patton CRNA  November 14, 2024  10:08 AM

## 2024-11-14 NOTE — OP NOTE
.[facility]  Ophthalmology Full Operative Note    Pre-operative diagnosis: Nuclear sclerotic cataract of right eye [H25.11]  Presbyopia [H52.4]   Post-operative diagnosis Same   Procedure: Procedure(s):  Phacoemulsification Cataract Extraction Posterior Chamber Lens Right Eye   Surgeon: Toño White MD   Assistants(s):    Anesthesia: MAC with Local    Estimated blood loss: None    Total IV fluids: (See anesthesia record)   Specimens: None   Implants: 16.5 CNWTT0   Findings:    Complications: None   Condition: Stable   Comments:       PROCEDURAL ANESTHESIA:     Topical/MAC.  Lidocaine 2% jelly topically and Lidocaine 1% preservative-free intracamerally.     IOL Model: [place] Panoptix CNWTT0    IOL Serial Number: [id number]    IOL Power Performed: +16.50        PROCEDURE: The usual topical anesthetic and dilating drops were administered in the pre-op area, The patient was brought to the Operating Room and prepped and draped in standard sterile ophthalmic manner. A lid speculum was placed and the microscope was brought into the field keeping the microscope illumination as low as possible throughout the case and removing the microscope light from the field whenever feasible. A paracentesis was created and 1% lidocaine was instilled in the anterior chamber.  The anterior chamber was then reformed with viscoelastic. A clear cornea temporal wound was created using a 2.8 mm keratome. A cystitome was used to initiate a flap in the anterior capsule and an Utrata forceps was used to create a continuous tear capsulorrhexis. Hydrodissection was performed. The phacoemulsification tip was inserted into the eye and the nucleus and epinucleous were removed using a * 2-handed divide technique.    The residual cortex was then removed using the I/A handpiece. The anterior chamber was then refilled with viscoelastic and the wound was enlarged with the keratome. The intraocular lens implant of appropriate power was  loaded into the injector and injected into the capsular bag. It was checked to make sure that it was central and stable. Residual viscoelastic was removed using the I/A. Then anterior chamber was refilled with BSS, and was noted to be watertight with no suture necessary.  The pressure was then adjusted to the normal range by releasing some fluid from the paracentesis.  Topical Pilocarpine 1%, Levobunolol, and moxifloxacin were applied. A hard shield was placed. No unplanned rupture of the posterior capsule occurred during this procedure. The patient tolerated the procedure well and was sent to the Recovery Room in satisfactory condition.    Surgeon Comment: 11/14/24. PROCEDURE: PECE/PCL OD, topical/MAC with IV sedation. Used capsule polisher. No complications. 2nd eye, 16.5 CNWTT0 PanOptix. Nuclear sclerotic cataract, Presbyopia.    Physician:      PAULA White M.D.

## 2024-11-14 NOTE — ANESTHESIA CARE TRANSFER NOTE
Patient: Estela Schafer    Procedure: Procedure(s):  Phacoemulsification Cataract Extraction Posterior Chamber Lens Right Eye       Diagnosis: Nuclear sclerotic cataract of right eye [H25.11]  Presbyopia [H52.4]  Diagnosis Additional Information: No value filed.    Anesthesia Type:   MAC     Note:    Oropharynx: oropharynx clear of all foreign objects  Level of Consciousness: awake  Oxygen Supplementation: room air    Independent Airway: airway patency satisfactory and stable  Dentition: dentition unchanged  Vital Signs Stable: post-procedure vital signs reviewed and stable  Report to RN Given: handoff report given  Patient transferred to: Phase II    Handoff Report: Identifed the Patient, Identified the Reponsible Provider, Reviewed the pertinent medical history, Discussed the surgical course, Reviewed Intra-OP anesthesia mangement and issues during anesthesia, Set expectations for post-procedure period and Allowed opportunity for questions and acknowledgement of understanding  Vitals:  Vitals Value Taken Time   BP     Temp     Pulse     Resp     SpO2         Electronically Signed By: GILBERTO Davidson CRNA  November 14, 2024  8:47 AM

## 2024-11-23 ENCOUNTER — HEALTH MAINTENANCE LETTER (OUTPATIENT)
Age: 69
End: 2024-11-23

## 2025-01-02 DIAGNOSIS — I10 ESSENTIAL HYPERTENSION: ICD-10-CM

## 2025-01-02 RX ORDER — METOPROLOL SUCCINATE 50 MG/1
50 TABLET, EXTENDED RELEASE ORAL DAILY
Qty: 90 TABLET | Refills: 0 | Status: SHIPPED | OUTPATIENT
Start: 2025-01-02

## 2025-01-02 NOTE — TELEPHONE ENCOUNTER
Attempt # 1  Outcome: Left Message   Comment: LVM for patient to call to schedule a visit for HTN with Dr. Prado.

## 2025-01-02 NOTE — TELEPHONE ENCOUNTER
METOPROLOL SUCC ER 50 MG TAB       Last Written Prescription Date:  12/04/2023  Last Fill Quantity: 90,   # refills: 3  Last Office Visit: 10/07/2024  Future Office visit:       Routing refill request to provider for review/approval because:    Beta-Blockers Protocol [id number]/02/2025 09:07 AM   Protocol Details Most recent blood pressure under 140/90 in past 12 months          Kimberly Boecker, RN

## 2025-01-20 DIAGNOSIS — F41.9 ANXIETY: ICD-10-CM

## 2025-01-20 DIAGNOSIS — I10 ESSENTIAL HYPERTENSION: ICD-10-CM

## 2025-01-20 NOTE — TELEPHONE ENCOUNTER
Losartan (Cozaar) 50 mg tablet  Take 1 tablet (50 mg) by mouth 2 times daily   Last Written Prescription Date:  10-1-24  Last Fill Quantity: 180 tablet,   # refills: 0  Last Office Visit: 12-6-24  Future Office visit:

## 2025-01-20 NOTE — TELEPHONE ENCOUNTER
Buspar  Last Written Prescription Date: 12/13/24  Last Fill Quantity: 60 # of Refills: 0  Last Office Visit: 12/6/24

## 2025-01-21 RX ORDER — LOSARTAN POTASSIUM 50 MG/1
50 TABLET ORAL 2 TIMES DAILY
Qty: 180 TABLET | Refills: 1 | Status: SHIPPED | OUTPATIENT
Start: 2025-01-21

## 2025-01-21 RX ORDER — BUSPIRONE HYDROCHLORIDE 5 MG/1
5 TABLET ORAL 2 TIMES DAILY
Qty: 60 TABLET | Refills: 5 | Status: SHIPPED | OUTPATIENT
Start: 2025-01-21

## 2025-02-25 DIAGNOSIS — F41.9 ANXIETY: ICD-10-CM

## 2025-02-25 RX ORDER — CLONAZEPAM 0.5 MG/1
TABLET ORAL
Qty: 60 TABLET | Refills: 0 | Status: SHIPPED | OUTPATIENT
Start: 2025-02-25

## 2025-02-25 NOTE — TELEPHONE ENCOUNTER
CLONAZEPAM 0.5 MG TABLET       Last Written Prescription Date:  10/01/2024  Last Fill Quantity: 60,   # refills: 0  Last Office Visit: 10/07/2024  Future Office visit:    Next 5 appointments (look out 90 days)      May 06, 2025 2:00 PM  (Arrive by 1:45 PM)  Provider Visit with Kassandra Prado MD  [facility] ([facility] ) [address]  [place] [address]  [phone]             Routing refill request to provider for review/approval because:      Kimberly Boecker, RN

## 2025-03-09 ENCOUNTER — HEALTH MAINTENANCE LETTER (OUTPATIENT)
Age: 70
End: 2025-03-09

## 2025-04-15 DIAGNOSIS — I10 ESSENTIAL HYPERTENSION: ICD-10-CM

## 2025-04-16 RX ORDER — METOPROLOL SUCCINATE 50 MG/1
50 TABLET, EXTENDED RELEASE ORAL DAILY
Qty: 90 TABLET | Refills: 3 | Status: SHIPPED | OUTPATIENT
Start: 2025-04-16

## 2025-04-16 NOTE — TELEPHONE ENCOUNTER
METOPROLOL SUCC ER 50 MG TAB       Last Written Prescription Date:  01/02/2025  Last Fill Quantity: 90,   # refills: 0  Last Office Visit: 10/07/2024  Future Office visit:    Next 5 appointments (look out 90 days)      May 06, 2025 2:00 PM  (Arrive by 1:45 PM)  Provider Visit with Kassandra Prado MD  [facility] ([facility] ) [address]  [place] [address]  [phone]             Routing refill request to provider for review/approval because:  Beta-Blockers Protocol [id number]/15/2025 02:50 PM   Protocol Details Most recent blood pressure under 140/90 in past 12 months       Kimberly Boecker, RN

## 2025-06-29 ENCOUNTER — HOSPITAL ENCOUNTER (EMERGENCY)
Facility: HOSPITAL | Age: 70
Discharge: HOME | End: 2025-06-29
Payer: COMMERCIAL

## 2025-06-29 VITALS
OXYGEN SATURATION: 97 % | HEART RATE: 85 BPM | RESPIRATION RATE: 18 BRPM | DIASTOLIC BLOOD PRESSURE: 81 MMHG | SYSTOLIC BLOOD PRESSURE: 146 MMHG | TEMPERATURE: 97.1 F

## 2025-06-29 DIAGNOSIS — J01.90 ACUTE SINUSITIS WITH SYMPTOMS > 10 DAYS: ICD-10-CM

## 2025-06-29 PROCEDURE — 99213 OFFICE O/P EST LOW 20 MIN: CPT

## 2025-06-29 PROCEDURE — G0463 HOSPITAL OUTPT CLINIC VISIT: HCPCS

## 2025-06-29 ASSESSMENT — ENCOUNTER SYMPTOMS
COUGH: 0
SHORTNESS OF BREATH: 0
SINUS PAIN: 1
ACTIVITY CHANGE: 0
FEVER: 0
CHILLS: 0
APPETITE CHANGE: 0
SINUS PRESSURE: 1

## 2025-06-29 ASSESSMENT — COLUMBIA-SUICIDE SEVERITY RATING SCALE - C-SSRS
6. HAVE YOU EVER DONE ANYTHING, STARTED TO DO ANYTHING, OR PREPARED TO DO ANYTHING TO END YOUR LIFE?: NO
2. HAVE YOU ACTUALLY HAD ANY THOUGHTS OF KILLING YOURSELF IN THE PAST MONTH?: NO
1. IN THE PAST MONTH, HAVE YOU WISHED YOU WERE DEAD OR WISHED YOU COULD GO TO SLEEP AND NOT WAKE UP?: NO

## 2025-06-29 ASSESSMENT — ACTIVITIES OF DAILY LIVING (ADL): ADLS_ACUITY_SCORE: 41

## 2025-06-29 NOTE — DISCHARGE INSTRUCTIONS
Augmentin twice daily for 10 days. Yogurt or probiotic while taking. Eat with this medication.     Follow up in the clinic as needed.   Return with any new or concerning symptoms.

## 2025-06-29 NOTE — ED PROVIDER NOTES
History     Chief Complaint   Patient presents with    Sinusitis     HPI  Estela Schafer is a 70 year old female who presents to the urgent care with complaints of sinus pressure for the last 2-3 weeks. She denies fevers, chills, chest pain, and shortness of breath. She has been using flonase and nasal saline without relief. No recent abx.      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 Dr Frey      Essential hypertension 03/28/2006     Priority: Medium     Problem list name updated by automated process. Provider to review      Sinusitis, chronic 02/25/2000     Priority: Medium     Problem list name updated by automated process. Provider to review          Past Medical History:    Past Medical History:   Diagnosis Date    Asymptomatic varicose veins 02/04/2013    Basal cell carcinoma 04/26/2007    Esophageal reflux 05/26/2011    Hypertension 03/28/2006    Unspecified sinusitis (chronic) 02/25/2000       Past Surgical History:    Past Surgical History:   Procedure Laterality Date    Arthroscopy right meniscal tear  01/01/2006    Dr Orozco    Arthroscopy  right shoulder  01/01/2010    COLONOSCOPY  01/01/2008    Family history of colon cancer (mother). Negative. Repeat 2013    COLONOSCOPY - HIM SCAN N/A 05/17/2016    Colonoscopy diagnostic;  Surgeon:  Gonsalo Prasad MD;  Location:  [facility] [address]    DILATION AND CURETTAGE      PHACOEMULSIFICATION WITH STANDARD INTRAOCULAR LENS IMPLANT Left 10/23/2024    Procedure: Phacoemulsification Cataract Extraction Posterior Chamber Lens Left Eye;  Surgeon: Toño White MD;  Location: [facility] OR    PHACOEMULSIFICATION WITH STANDARD INTRAOCULAR LENS IMPLANT Right 11/14/2024    Procedure: Phacoemulsification Cataract Extraction Posterior Chamber Lens Right Eye;  Surgeon: Toño White MD;  Location: [facility] OR    Removed mass left neck  01/01/2007    Dr Cervantes    Septoplasty and sinus surgery  01/01/2001    [place] teeth removed         Family History:    Family History   Problem Relation Age of Onset    Cancer Mother         colon    Cancer - colorectal Sister     Hypertension No family hx of     Cerebrovascular Disease No family hx of        Social History:  Marital Status:   [2]  Social History     Tobacco Use    Smoking status: Never     Passive exposure: Never    Smokeless tobacco: Never   Vaping Use    Vaping status: Never Used   Substance Use Topics    Alcohol use: Yes     Comment: 4-6 times a week; last drink 2 days ago    Drug use: No        Medications:    amoxicillin-clavulanate (AUGMENTIN) 875-125 MG tablet  amLODIPine (NORVASC) 5 MG tablet  bromfenac (BROMSITE) 0.075 % SOLN ophthalmic solution  busPIRone (BUSPAR) 5 MG tablet  clonazePAM (KLONOPIN) 0.5 MG tablet  difluprednate (DUREZOL) 0.05 % ophthalmic emulsion  estrogen conj-medroxyPROGESTERone (PREMPRO) 0.45-1.5 MG tablet  hydrocortisone 2.5 % cream  ibuprofen (ADVIL/MOTRIN) 600 MG tablet  losartan (COZAAR) 50 MG tablet  metoprolol succinate ER (TOPROL XL) 50 MG 24 hr tablet  moxifloxacin (VIGAMOX) 0.5 % ophthalmic solution  tretinoin (RETIN-A)  0.025 % cream          Review of Systems   Constitutional:  Negative for activity change, appetite change, chills and fever.   HENT:  Positive for congestion, sinus pressure and sinus pain.    Respiratory:  Negative for cough and shortness of breath.    Cardiovascular:  Negative for chest pain.   All other systems reviewed and are negative.      Physical Exam   BP: (!) 146/81  Pulse: 85  Temp: 97.1  F (36.2  C)  Resp: 18  SpO2: 97 %      Physical Exam  Vitals and nursing note reviewed.   Constitutional:       General: She is not in acute distress.     Appearance: Normal appearance. She is not ill-appearing or toxic-appearing.   HENT:      Head:      Jaw: No trismus.      Right Ear: Tympanic membrane is not erythematous.      Left Ear: Tympanic membrane is not erythematous.      Nose: Congestion present.      Right Sinus: Maxillary sinus tenderness and frontal sinus tenderness present.      Left Sinus: Maxillary sinus tenderness and frontal sinus tenderness present.   Cardiovascular:      Rate and Rhythm: Normal rate and regular rhythm.      Pulses: Normal pulses.      Heart sounds: Normal heart sounds. No murmur heard.  Pulmonary:      Effort: Pulmonary effort is normal.      Breath sounds: Normal breath sounds. No wheezing, rhonchi or rales.   Neurological:      Mental Status: She is alert.         ED Course        Procedures       No results found for this or any previous visit (from the past 24 hours).    Medications - No data to display    Assessments & Plan (with Medical Decision Making)     I have reviewed the nursing notes.    I have reviewed the findings, diagnosis, plan and need for follow up with the patient.  Estela Schafer is a 70 year old female who presents to the urgent care with complaints of sinus pressure for the last 2 months. She denies fevers, chills, chest pain, and shortness of breath. She has been using flonase and nasal saline without relief. No recent abx.      MDM: vital signs normal,  afebrile. Non toxic in appearance with no noted distress. Lungs clear, hear tones regular. Bilateral frontal and maxillary sinus tenderness. Will treat with augmentin. Supportive measures and return precautions discussed. She is in agreement with plan.     (J01.90) Acute sinusitis with symptoms > 10 days  Plan: Augmentin twice daily for 10 days. Yogurt or probiotic while taking. Eat with this medication.     Follow up in the clinic as needed.   Return with any new or concerning symptoms. Understanding verbalized.       New Prescriptions    AMOXICILLIN-CLAVULANATE (AUGMENTIN) 875-125 MG TABLET    Take 1 tablet by mouth 2 times daily for 10 days.       Final diagnoses:   Acute sinusitis with symptoms > 10 days       6/29/2025   [facility]       Marybeth Zurita NP  06/29/25 1151

## 2025-07-02 ENCOUNTER — TELEPHONE (OUTPATIENT)
Dept: EMERGENCY MEDICINE | Facility: HOSPITAL | Age: 70
End: 2025-07-02

## 2025-07-02 DIAGNOSIS — J01.90 ACUTE SINUSITIS WITH SYMPTOMS > 10 DAYS: Primary | ICD-10-CM

## 2025-07-02 RX ORDER — CEFPROZIL 500 MG/1
500 TABLET, FILM COATED ORAL 2 TIMES DAILY
Qty: 14 TABLET | Refills: 0 | Status: SHIPPED | OUTPATIENT
Start: 2025-07-02 | End: 2025-07-09

## 2025-07-02 NOTE — ED PROVIDER NOTES
Patient called urgent care requesting a new antibiotic prescription.  She was seen in this department on 6/29/2025 and diagnosed with acute sinusitis.  She was prescribed Augmentin and has been taking it.  However patient states that she has had nausea, stomach cramping and diarrhea since starting this antibiotic.  Patient reportedly told the nurse that she always gets the symptoms every time she has this antibiotic but this time she is unable to tolerate it.  She is requesting the antibiotic to be switched.  This writer switched her antibiotics cefprozil which she has had in the past.  She should follow-up with her primary doctor or go to urgent care/ER if her symptoms worsen or concerning symptoms.     Alta Watson, CNP  07/02/25 [id number]

## 2025-07-09 ENCOUNTER — TELEPHONE (OUTPATIENT)
Dept: FAMILY MEDICINE | Facility: OTHER | Age: 70
End: 2025-07-09

## 2025-07-31 ENCOUNTER — TELEPHONE (OUTPATIENT)
Dept: FAMILY MEDICINE | Facility: OTHER | Age: 70
End: 2025-07-31

## 2025-07-31 DIAGNOSIS — F41.9 ANXIETY: ICD-10-CM

## 2025-07-31 RX ORDER — CLONAZEPAM 0.5 MG/1
TABLET ORAL
Qty: 60 TABLET | Refills: 0 | Status: SHIPPED | OUTPATIENT
Start: 2025-07-31

## 2025-07-31 NOTE — TELEPHONE ENCOUNTER
Reason for call:  Medication      Have you contacted your pharmacy? Yes   If patient has contacted Pharmacy and it has been over 72hrs, continue to #2  Medication Clonazapam   What Pharmacy do you use? [facility]      (Please note that the turn-around-time for prescriptions is 72 business hours; I am sending your request at this time. SEND TO appropriate Care Team Pool )lp

## (undated) DEVICE — EYE LENS CARTRIDGE FOR PLATINUM IOL INJECTOR 1MTEC30

## (undated) DEVICE — PACK PHACO STELLARIS VAC 1 AUX DRP 1 NDL WRNCH BL5110

## (undated) DEVICE — EYE CANN IRR 25GA CYSTOTOME 581610

## (undated) DEVICE — BIN-CATARACT BIN BN37

## (undated) DEVICE — INSTRUMENT WIPE VISIWIPE 581047

## (undated) DEVICE — PACK EYE CUSTOM SEY32EPMBO

## (undated) DEVICE — EYE PREP BETADINE 5% SOLUTION 30ML 0065-0411-30

## (undated) DEVICE — CANNULA IRR 7/8IN 25GA VSTC VSCFL 45D 9MM DISP 585045

## (undated) DEVICE — CANNULA IRR 7/8IN 30GA VSTC VSCFL 45D 9MM DISP 585046

## (undated) DEVICE — GLOVE PROTEXIS POWDER FREE 7.0 ORTHOPEDIC 2D73ET70

## (undated) DEVICE — EYE KNIFE MICRO 15DEG BEVEL 377516

## (undated) DEVICE — BIN-TECNIS DCB00 LENSES

## (undated) DEVICE — SET HANDPIECE IRRIG/ASPIRATION 2.2-2.8X5.4" 45DEG TIP 85910S

## (undated) DEVICE — EYE KNIFE SLIT XSTAR VISITEC 2.8MM 45DEG 373728

## (undated) DEVICE — INJECTOR PORT FOR IOL LENSES SOFPORT EZ-28

## (undated) DEVICE — BIN-LENS IMPLANT CART

## (undated) DEVICE — EYE CANN IRR 25GA HYDRODISSECTING 585037

## (undated) DEVICE — GLOVE PROTEXIS POWDER FREE CLSC 7.5  2D72PL75X

## (undated) DEVICE — POLISHER LENS OLIVE 1INX23GA BD VISITEC .6X2X1MM 585143

## (undated) RX ORDER — FENTANYL CITRATE 50 UG/ML
INJECTION, SOLUTION INTRAMUSCULAR; INTRAVENOUS
Status: DISPENSED
Start: 2024-11-14

## (undated) RX ORDER — FENTANYL CITRATE 50 UG/ML
INJECTION, SOLUTION INTRAMUSCULAR; INTRAVENOUS
Status: DISPENSED
Start: 2024-10-23